# Patient Record
Sex: MALE | Race: WHITE | Employment: FULL TIME | URBAN - METROPOLITAN AREA
[De-identification: names, ages, dates, MRNs, and addresses within clinical notes are randomized per-mention and may not be internally consistent; named-entity substitution may affect disease eponyms.]

---

## 2017-02-13 NOTE — PROGRESS NOTES
SUBJECTIVE:     CC: Eric Mandel is an 36 year old male who presents for preventative health visit.     Physical   Annual:     Getting at least 3 servings of Calcium per day::  Yes    Bi-annual eye exam::  Yes    Dental care twice a year::  Yes    Sleep apnea or symptoms of sleep apnea::  None    Diet::  Regular (no restrictions)    Frequency of exercise::  4-5 days/week    Duration of exercise::  Greater than 60 minutes    Taking medications regularly::  Yes    Medication side effects::  Not applicable    Additional concerns today::  YES      Vitamin D level.  Has money from HSA from previous job and would like to get a variety of other blood tests done today, paying for them out of his HSA if they're not covered.     Would like refill of triamcinolone cream for eczema of left foot today.         Today's PHQ-2 Score:   PHQ-2 ( 1999 Pfizer) 2/8/2017   Q1: Little interest or pleasure in doing things -   Q2: Feeling down, depressed or hopeless -   PHQ-2 Score -   Little interest or pleasure in doing things Not at all   Feeling down, depressed or hopeless Not at all   PHQ-2 Score 0       Abuse: Current or Past(Physical, Sexual or Emotional)- NO  Do you feel safe in your environment - YES    Social History   Substance Use Topics     Smoking status: Former Smoker     Types: Cigarettes     Quit date: 5/5/2000     Smokeless tobacco: Never Used     Alcohol use Yes      Comment: Occasional     The patient does not drink >3 drinks per day nor >7 drinks per week.    Last PSA: No results found for: PSA    Recent Labs   Lab Test  10/31/14   1134   CHOL  139   HDL  65   LDL  62   TRIG  61   CHOLHDLRATIO  2.1       Reviewed orders with patient. Reviewed health maintenance and updated orders accordingly - Yes    All Histories reviewed and updated in Epic.      ROS:  C: NEGATIVE for fever, chills, change in weight  I: NEGATIVE for worrisome rashes, moles or lesions  E: NEGATIVE for vision changes or irritation  ENT: NEGATIVE for ear,  mouth and throat problems  R: NEGATIVE for significant cough or SOB  CV: NEGATIVE for chest pain, palpitations or peripheral edema  GI: NEGATIVE for nausea, abdominal pain, heartburn, or change in bowel habits   male: negative for dysuria, hematuria, decreased urinary stream, erectile dysfunction, urethral discharge  M: NEGATIVE for significant arthralgias or myalgia  N: NEGATIVE for weakness, dizziness or paresthesias  P: NEGATIVE for changes in mood or affect    Problem list, Medication list, Allergies, and Medical/Social/Surgical histories reviewed in [facility] and updated as appropriate.  Labs reviewed in EPIC  OBJECTIVE:     There were no vitals taken for this visit.  EXAM:  GENERAL: healthy, alert and no distress  EYES: Eyes grossly normal to inspection, PERRL and conjunctivae and sclerae normal  HENT: ear canals and TM's normal, nose and mouth without ulcers or lesions  NECK: cervical adenopathy - one node on left anterior chain, no asymmetry, masses, or scars and thyroid normal to palpation  RESP: lungs clear to auscultation - no rales, rhonchi or wheezes  CV: regular rate and rhythm, normal S1 S2, no S3 or S4, no murmur, click or rub, no peripheral edema and peripheral pulses strong  ABDOMEN: soft, nontender, no hepatosplenomegaly, no masses and bowel sounds normal   (male): normal male genitalia without lesions or urethral discharge, no hernia  MS: no gross musculoskeletal defects noted, no edema  SKIN: no suspicious lesions or rashes  NEURO: Normal strength and tone, mentation intact and speech normal  PSYCH: mentation appears normal, affect normal/bright    ASSESSMENT/PLAN:     1. Encounter for routine adult health examination without abnormal findings  For the most part, is up to date with preventative care measures.  Encouraged continued good diet and exercise program.   - Comprehensive metabolic panel (BMP + Alb, Alk Phos, ALT, AST, Total. Bili, TP)  - Vitamin D Deficiency  - Testosterone, total  - TSH  "with free T4 reflex  - CBC with platelets differential  - TDAP (ADACEL AGES 11-64)    2. Need for prophylactic vaccination with tetanus-diphtheria (TD)  Tdap given today.    3. Other eczema  Refill given for small spot of eczema on foot.  - triamcinolone (KENALOG) 0.1 % cream; Apply sparingly to affected area three times daily for 14 days.  Dispense: 30 g; Refill: 1    COUNSELING:   Reviewed preventive health counseling, as reflected in patient instructions         reports that he quit smoking about 16 years ago. His smoking use included Cigarettes. He has never used smokeless tobacco.    Estimated body mass index is 24.17 kg/(m^2) as calculated from the following:    Height as of 6/30/15: 6' 3\" (1.905 m).    Weight as of 6/30/15: 193 lb 6 oz (87.7 kg).   Weight management plan: Discussed healthy diet and exercise guidelines and patient will follow up in 12 months in clinic to re-evaluate.    Counseling Resources:  ATP IV Guidelines  Pooled Cohorts Equation Calculator  FRAX Risk Assessment  ICSI Preventive Guidelines  Dietary Guidelines for Americans, 2010  [employer]'s MyPlate  ASA Prophylaxis  Lung CA Screening    Jr Martin Carmona MD  [facility] S[facility]AGEAnswers for HPI/ROS submitted by the patient on 2/8/2017   PHQ-2 Depressed: Not at all, Not at all  PHQ-2 Score: 0    "

## 2017-02-14 ENCOUNTER — OFFICE VISIT (OUTPATIENT)
Dept: FAMILY MEDICINE | Facility: CLINIC | Age: 37
End: 2017-02-14
Payer: COMMERCIAL

## 2017-02-14 VITALS
WEIGHT: 201 LBS | DIASTOLIC BLOOD PRESSURE: 78 MMHG | OXYGEN SATURATION: 98 % | HEART RATE: 92 BPM | TEMPERATURE: 98.2 F | SYSTOLIC BLOOD PRESSURE: 112 MMHG | BODY MASS INDEX: 24.99 KG/M2 | HEIGHT: 75 IN

## 2017-02-14 DIAGNOSIS — Z23 NEED FOR PROPHYLACTIC VACCINATION WITH TETANUS-DIPHTHERIA (TD): ICD-10-CM

## 2017-02-14 DIAGNOSIS — L30.8 OTHER ECZEMA: ICD-10-CM

## 2017-02-14 DIAGNOSIS — Z00.00 ENCOUNTER FOR ROUTINE ADULT HEALTH EXAMINATION WITHOUT ABNORMAL FINDINGS: Primary | ICD-10-CM

## 2017-02-14 LAB
BASOPHILS # BLD AUTO: 0 10E9/L (ref 0–0.2)
BASOPHILS NFR BLD AUTO: 0.4 %
DIFFERENTIAL METHOD BLD: NORMAL
EOSINOPHIL # BLD AUTO: 0.2 10E9/L (ref 0–0.7)
EOSINOPHIL NFR BLD AUTO: 3.4 %
ERYTHROCYTE [DISTWIDTH] IN BLOOD BY AUTOMATED COUNT: 12.8 % (ref 10–15)
HCT VFR BLD AUTO: 46.5 % (ref 40–53)
HGB BLD-MCNC: 16.3 G/DL (ref 13.3–17.7)
LYMPHOCYTES # BLD AUTO: 1.9 10E9/L (ref 0.8–5.3)
LYMPHOCYTES NFR BLD AUTO: 33.4 %
MCH RBC QN AUTO: 31.5 PG (ref 26.5–33)
MCHC RBC AUTO-ENTMCNC: 35.1 G/DL (ref 31.5–36.5)
MCV RBC AUTO: 90 FL (ref 78–100)
MONOCYTES # BLD AUTO: 0.6 10E9/L (ref 0–1.3)
MONOCYTES NFR BLD AUTO: 11.1 %
NEUTROPHILS # BLD AUTO: 2.9 10E9/L (ref 1.6–8.3)
NEUTROPHILS NFR BLD AUTO: 51.7 %
PLATELET # BLD AUTO: 226 10E9/L (ref 150–450)
RBC # BLD AUTO: 5.17 10E12/L (ref 4.4–5.9)
WBC # BLD AUTO: 5.7 10E9/L (ref 4–11)

## 2017-02-14 PROCEDURE — 36415 COLL VENOUS BLD VENIPUNCTURE: CPT | Performed by: FAMILY MEDICINE

## 2017-02-14 PROCEDURE — 80050 GENERAL HEALTH PANEL: CPT | Performed by: FAMILY MEDICINE

## 2017-02-14 PROCEDURE — 84403 ASSAY OF TOTAL TESTOSTERONE: CPT | Performed by: FAMILY MEDICINE

## 2017-02-14 PROCEDURE — 90715 TDAP VACCINE 7 YRS/> IM: CPT | Performed by: FAMILY MEDICINE

## 2017-02-14 PROCEDURE — 99395 PREV VISIT EST AGE 18-39: CPT | Mod: 25 | Performed by: FAMILY MEDICINE

## 2017-02-14 PROCEDURE — 82306 VITAMIN D 25 HYDROXY: CPT | Performed by: FAMILY MEDICINE

## 2017-02-14 PROCEDURE — 90471 IMMUNIZATION ADMIN: CPT | Performed by: FAMILY MEDICINE

## 2017-02-14 RX ORDER — TRIAMCINOLONE ACETONIDE 1 MG/G
CREAM TOPICAL
Qty: 30 G | Refills: 1 | Status: SHIPPED | OUTPATIENT
Start: 2017-02-14 | End: 2019-12-02

## 2017-02-14 NOTE — MR AVS SNAPSHOT
After Visit Summary   2/14/2017    Eric Mandel    MRN: [id number]           Patient Information     Date Of Birth          1980        Visit Information        Provider Department      2/14/2017 9:40 AM Martin Carmona Jr., MD [facility]        Today's Diagnoses     Encounter for routine adult health examination without abnormal findings    -  1    Need for prophylactic vaccination with tetanus-diphtheria (TD)        Other eczema           Follow-ups after your visit        Follow-up notes from your care team     Return in about 1 year (around 2/14/2018) for Routine Visit.      Who to contact     If you have questions or need follow up information about today's clinic visit or your schedule please contact [facility] directly at [phone].  Normal or non-critical lab and imaging results will be communicated to you by MyChart, letter or phone within 4 business days after the clinic has received the results. If you do not hear from us within 7 days, please contact the clinic through [employer] or phone. If you have a critical or abnormal lab result, we will notify you by phone as soon as possible.  Submit refill requests through [employer] or call your pharmacy and they will forward the refill request to us. Please allow 3 business days for your refill to be completed.          Additional Information About Your Visit        MyChart Information     [employer] gives you secure access to your electronic health record. If you see a primary care provider, you can also send messages to your care team and make appointments. If you have questions, please call your primary care clinic.  If you do not have a primary care provider, please call [phone] and they will assist you.        Care EveryWhere ID     This is your Care EveryWhere ID. This could be used by other organizations to access your [facility] medical records  [id number]        Your Vitals Were     Pulse Temperature  "Height Pulse Oximetry BMI (Body Mass Index)       92 98.2  F (36.8  C) (Oral) 6' 3\" (1.905 m) 98% 25.12 kg/m2        Blood Pressure from Last 3 Encounters:   02/14/17 112/78   06/30/15 138/80   10/31/14 124/80    Weight from Last 3 Encounters:   02/14/17 201 lb (91.2 kg)   06/30/15 193 lb 6 oz (87.7 kg)   10/31/14 204 lb (92.5 kg)              We Performed the Following     CBC with platelets differential     Comprehensive metabolic panel (BMP + Alb, Alk Phos, ALT, AST, Total. Bili, TP)     TDAP (ADACEL AGES 11-64)     Testosterone, total     TSH with free T4 reflex     Vitamin D Deficiency          Where to get your medicines      These medications were sent to [facility] [address] AT [facility] [address] [place] [address], [facility] [address]    Hours:  24-hours Phone:  [phone]     triamcinolone 0.1 % cream          Primary Care Provider Office Phone # Fax #    Josefina Schraderpee [facility] [phone]       [facility] [place], [place] [address]. Formerly [facility] [address]        Thank you!     Thank you for choosing [facility]  for your care. Our goal is always to provide you with excellent care. Hearing back from our patients is one way we can continue to improve our services. Please take a few minutes to complete the written survey that you may receive in the mail after your visit with us. Thank you!             Your Updated Medication List - Protect others around you: Learn how to safely use, store and throw away your medicines at www.disposemymeds.org.          This list is accurate as of: 2/14/17 10:20 AM.  Always use your most recent med list.                   Brand Name Dispense Instructions for use    hydrocortisone 1 % ointment      Apply topically 2 times daily       triamcinolone 0.1 % cream    KENALOG    30 g    Apply sparingly to affected area three times daily for 14 days.         "

## 2017-02-14 NOTE — NURSING NOTE
"Chief Complaint   Patient presents with     Physical       Initial /78 (BP Location: Right arm, Patient Position: Chair, Cuff Size: Adult Large)  Pulse 92  Temp 98.2  F (36.8  C) (Oral)  Ht 6' 3\" (1.905 m)  Wt 201 lb (91.2 kg)  SpO2 98%  BMI 25.12 kg/m2 Estimated body mass index is 25.12 kg/(m^2) as calculated from the following:    Height as of this encounter: 6' 3\" (1.905 m).    Weight as of this encounter: 201 lb (91.2 kg).  Medication Reconciliation: complete  "

## 2017-02-15 LAB
ALBUMIN SERPL-MCNC: 4.5 G/DL (ref 3.4–5)
ALP SERPL-CCNC: 89 U/L (ref 40–150)
ALT SERPL W P-5'-P-CCNC: 23 U/L (ref 0–70)
ANION GAP SERPL CALCULATED.3IONS-SCNC: 7 MMOL/L (ref 3–14)
AST SERPL W P-5'-P-CCNC: 14 U/L (ref 0–45)
BILIRUB SERPL-MCNC: 0.8 MG/DL (ref 0.2–1.3)
BUN SERPL-MCNC: 17 MG/DL (ref 7–30)
CALCIUM SERPL-MCNC: 9.3 MG/DL (ref 8.5–10.1)
CHLORIDE SERPL-SCNC: 103 MMOL/L (ref 94–109)
CO2 SERPL-SCNC: 30 MMOL/L (ref 20–32)
CREAT SERPL-MCNC: 0.99 MG/DL (ref 0.66–1.25)
DEPRECATED CALCIDIOL+CALCIFEROL SERPL-MC: 41 UG/L (ref 20–75)
GFR SERPL CREATININE-BSD FRML MDRD: 85 ML/MIN/1.7M2
GLUCOSE SERPL-MCNC: 85 MG/DL (ref 70–99)
POTASSIUM SERPL-SCNC: 3.9 MMOL/L (ref 3.4–5.3)
PROT SERPL-MCNC: 7.2 G/DL (ref 6.8–8.8)
SODIUM SERPL-SCNC: 140 MMOL/L (ref 133–144)
TSH SERPL DL<=0.005 MIU/L-ACNC: 0.83 MU/L (ref 0.4–4)

## 2017-02-16 LAB — TESTOST SERPL-MCNC: 728 NG/DL (ref 240–950)

## 2017-02-17 NOTE — PROGRESS NOTES
Mr. Mandel,    -Liver and gallbladder tests are normal. (ALT,AST, Alk phos, bilirubin), kidney function is normal (Cr, GFR), Sodium is normal, Potassium is normal, Calcium is normal, Glucose is normal (diabetes screening test).   -Cholesterol levels (LDL,HDL, Triglycerides) are normal. ADVISE: rechecking in 5 years.  -Normal red blood cell (hgb) levels, normal white blood cell count and normal platelet levels.  -Vitamin D level is normal, 1000 IU daily in diet or supplements is recommended.   -Testosterone level is normal.    If you have further questions about the interpretation of your labs, labtestsonline.org is a good website to check out for further information.    Please contact the clinic if you have additional questions.  Thank you.    Sincerely,    Martin Carmona MD

## 2017-10-04 ENCOUNTER — TRANSFERRED RECORDS (OUTPATIENT)
Dept: HEALTH INFORMATION MANAGEMENT | Facility: CLINIC | Age: 37
End: 2017-10-04

## 2018-02-12 ENCOUNTER — OFFICE VISIT (OUTPATIENT)
Dept: PODIATRY | Facility: CLINIC | Age: 38
End: 2018-02-12
Payer: COMMERCIAL

## 2018-02-12 VITALS — BODY MASS INDEX: 27.35 KG/M2 | HEIGHT: 75 IN | WEIGHT: 220 LBS | RESPIRATION RATE: 16 BRPM

## 2018-02-12 DIAGNOSIS — G57.92 NEURITIS OF LEFT FOOT: Primary | ICD-10-CM

## 2018-02-12 PROCEDURE — 99203 OFFICE O/P NEW LOW 30 MIN: CPT | Performed by: PODIATRIST

## 2018-02-12 NOTE — PATIENT INSTRUCTIONS
Thank you for choosing [facility]!    DR. RADER'S CLINIC LOCATIONS:   MONDAY - [place] TUESDAY - [place]   [address] [facility]  [address] [facility]   [place] [address] [place] [address]   [phone]       THURSDAY AM - [place] THURSDAY PM - [place]   [address]   [place] [address] [place], [place] [address] [phone]       FRIDAY AM - [place] SET UP SURGERY: [phone] [address] APPOINTMENTS: [phone]   [place] [address] BILLING QUESTIONS: [phone] FAX NUMBER: [phone]     Follow Up: 3 wks    PRICE THERAPY  Many aches and pains throughout the foot and ankle can be helped with many simple treatments. This is usually described as PRICE Therapy.      P - Protection - often times, inflammation/pain in the lower extremity is not able to improve simply because the areas involved are never allowed to rest. Every step we take can bother the problematic area. Protecting those areas is an important step in the healing process. This may involve a walking cast boot, a special insert/orthotic device, an ankle brace, or simply avoiding barefoot walking.    R - Rest - in addition to protecting the foot/ankle, resting is an important, but often times difficult, treatment option. Getting off your feet when they bother you, and specifically avoiding activities that cause pain/discomfort, are very beneficial to prevent, and treat, foot/ankle pain.      I - Ice - icing regularly can help to decrease inflammation and swelling in the foot, thus decreasing pain. Using an ice pack or a bag of frozen veggies works very well. Ice for 20 minutes multiple times per day as needed.  Do not place the ice directly on the skin as this can cause tissue damage.    C - Compression - using a compression wrap or an ACE wrap can help to decrease swelling, which can help to decrease pain. Wearing the  wraps is generally not needed at night, but they should be worn on a regular basis when you are going to be on your feet for prolonged periods as gravity tends to pull fluids down to your feet/ankles.    E - Elevation - elevating your lower extremities multiple times daily for 15-20 minutes can help to decrease swelling, which works well in decreasing pain levels.    NSAID/Tylenol - Anti-inflammatories like Aleve or ibuprofen, and/or a pain medication, such as Tylenol, can help to improve pain levels and get the issue resolved sooner rather than later. Anyone with liver issues should be careful with Tylenol, and anyone with high blood pressure or heart, stomach or kidney issues should be careful with anti-inflammatories. Please ask if you have questions about these medications, including dosage.      Body Mass Index (BMI)  Many things can cause foot and ankle problems. Foot structure, activity level, foot mechanics and injuries are common causes of pain. One very important issue that often goes unmentioned, is body weight. Extra weight can cause increased stress on muscles, ligaments, bones and tendons. Sometimes just a few extra pounds is all it takes to put one over her/his threshold. Without reducing that stress, it can be difficult to alleviate pain. Some people are uncomfortable addressing this issue, but we feel it is important for you to think about it. As Foot &  Ankle specialists, our job is addressing the lower extremity problem and possible causes. Regarding extra body weight, we encourage patients to discuss diet and weight management plans with their primary care doctors. It is this team approach that gives you the best opportunity for pain relief and getting you back on your feet.

## 2018-02-12 NOTE — PROGRESS NOTES
"Foot & Ankle Surgery  February 12, 2018    CC: \"Pain/numbness top of foot'    I was asked to see Eric Mandel regarding the chief complaint by:  self    HPI:  Pt is a 37 year old male who presents with above complaint.  Left foot pain/numbness x 3 months, no injury noted.  \"burning, tearing, sometimes odd feeling of skin sliding around\".  Pain 6/10 \"intermittent, sometimes many x per day\".  Points to dorsal 1st TMT area, localized symptoms without radiation.  Increasing frequency, each episode lasts a few seconds.  Occurs mostly without shoes.  Worse with touching/pressure, although again he reports it occurs mostly without shoes.  No L knee injuries.  Sees chiropractor for neck issues but no lower back pain.  No treatment.  Also has L hallux contusion with subungual hematoma, drained @ UC, xrays neg, no pain.      ROS:   Pos for CC.  The patient denies current nausea, vomiting, chills, fevers, belly pain, calf pain, chest pain or SOB.  Complete remainder of ROS is otherwise neg.    VITALS:    Vitals:    02/12/18 1409   Resp: 16   Weight: 220 lb (99.8 kg)   Height: 6' 3\" (1.905 m)       PMH:    Past Medical History:   Diagnosis Date     NO ACTIVE PROBLEMS (aka NONE)        SXHX:    Past Surgical History:   Procedure Laterality Date     NO HISTORY OF SURGERY          MEDS:    Current Outpatient Prescriptions   Medication     triamcinolone (KENALOG) 0.1 % cream     hydrocortisone 1 % ointment     No current facility-administered medications for this visit.        ALL:   No Known Allergies    FMH:    Family History   Problem Relation Age of Onset     DIABETES Paternal Grandmother      DIABETES Paternal Grandfather        SocHx:    Social History     Social History     Marital status: Single     Spouse name: N/A     Number of children: N/A     Years of education: N/A     Occupational History     Not on file.     Social History Main Topics     Smoking status: Former Smoker     Types: Cigarettes     Quit date: 5/5/2000     " Smokeless tobacco: Never Used     Alcohol use Yes      Comment: Occasional     Drug use: No     Sexual activity: Yes     Partners: Female     Birth control/ protection: Condom     Other Topics Concern     Not on file     Social History Narrative           EXAMINATION:  Gen:   No apparent distress  Neuro:   A&Ox3, no deficits  Psych:    Answering questions appropriately for age and situation with normal affect  Head:    NCAT  Eye:    Visual scanning without deficit  Ear:    Response to auditory stimuli wnl  Lung:    Non-labored breathing on RA noted  Abd:    NTND per patient report  Lymph:    Neg for pitting/non-pitting edema BLE  Vasc:    Pulses palpable, CFT minimally delayed  Neuro:    Light touch sensation intact to all sensory nerve distributions with paresthesias dorsal L midfoot, jesus over 1st TMT joint.  No pain/reproduction of symptoms with percussion/palpation Common or Superficial peroneal nerves, no deep peroneal paresthesias.    Derm:    L hallux previously drained subungual hematoma without SOI  MSK:    Tenderness dorsal soft tissue at 1st TMT joint, but unable to elicit any MSK pain.    Calf:    Neg for redness, swelling or tenderness    Assessment:  37 year old male with painful paresthesias dorsal L midfoot; previously-drained subungual hematoma L hallux      Plan:  Discussed etiologies, anatomy and options  1.  Paresthesias/pain dorsal L midfoot  -no specific nerve involvement based on exam findings  -states it's worse without shoes but touching/pressure worsens symptoms.  Advised comfortable accommodative shoe gear that minimize pressure in the area  -RICE/NSAID prn  -consider PO vs steroid injection  -consider Neurology consult for NCS    Follow up:  3 weeks or sooner with acute issues      Patient's medical history was reviewed today    Body mass index is 27.5 kg/(m^2).  Weight management plan: Patient was referred to their PCP to discuss a diet and exercise plan.        Julio Prince DPM    Podiatric Foot & Ankle Surgeon  [facility]  [phone]

## 2018-02-12 NOTE — NURSING NOTE
"Chief Complaint   Patient presents with     Foot Problems     L dorsal foot pain and numbness x few months on and off and becoming more constant       Initial Resp 16  Ht 6' 3\" (1.905 m)  Wt 220 lb (99.8 kg)  BMI 27.5 kg/m2 Estimated body mass index is 27.5 kg/(m^2) as calculated from the following:    Height as of this encounter: 6' 3\" (1.905 m).    Weight as of this encounter: 220 lb (99.8 kg).  Medication Reconciliation: complete    Srinivasan Baker CMA ([facility])  Podiatry / Foot & Ankle Surgery  [facility]    "

## 2018-02-12 NOTE — MR AVS SNAPSHOT
After Visit Summary   2/12/2018    Eric Mandel    MRN: [id number]           Patient Information     Date Of Birth          1980        Visit Information        Provider Department      2/12/2018 2:15 PM Julio Prince DPM [facility]        Care Instructions    Thank you for choosing [facility]!    DR. PRINCE'S CLINIC LOCATIONS:   MONDAY - [place] TUESDAY - [place]   [address] [facility]  [address] [place]   [place] [address] [place] [address]   [phone]       THURSDAY AM - [place] THURSDAY PM - [place]   [address]   [place] [address] [place], [place] [address] [phone]       FRIDAY AM - [place] SET UP SURGERY: [phone] [address] APPOINTMENTS: [phone]   [place] [address] BILLING QUESTIONS: [phone] FAX NUMBER: [phone]     Follow Up: 3 wks    PRICE THERAPY  Many aches and pains throughout the foot and ankle can be helped with many simple treatments. This is usually described as PRICE Therapy.      P - Protection - often times, inflammation/pain in the lower extremity is not able to improve simply because the areas involved are never allowed to rest. Every step we take can bother the problematic area. Protecting those areas is an important step in the healing process. This may involve a walking cast boot, a special insert/orthotic device, an ankle brace, or simply avoiding barefoot walking.    R - Rest - in addition to protecting the foot/ankle, resting is an important, but often times difficult, treatment option. Getting off your feet when they bother you, and specifically avoiding activities that cause pain/discomfort, are very beneficial to prevent, and treat, foot/ankle pain.      I - Ice - icing regularly can help to decrease inflammation and swelling in the foot, thus decreasing pain. Using an ice pack or a bag of  frozen veggies works very well. Ice for 20 minutes multiple times per day as needed.  Do not place the ice directly on the skin as this can cause tissue damage.    C - Compression - using a compression wrap or an ACE wrap can help to decrease swelling, which can help to decrease pain. Wearing the wraps is generally not needed at night, but they should be worn on a regular basis when you are going to be on your feet for prolonged periods as gravity tends to pull fluids down to your feet/ankles.    E - Elevation - elevating your lower extremities multiple times daily for 15-20 minutes can help to decrease swelling, which works well in decreasing pain levels.    NSAID/Tylenol - Anti-inflammatories like Aleve or ibuprofen, and/or a pain medication, such as Tylenol, can help to improve pain levels and get the issue resolved sooner rather than later. Anyone with liver issues should be careful with Tylenol, and anyone with high blood pressure or heart, stomach or kidney issues should be careful with anti-inflammatories. Please ask if you have questions about these medications, including dosage.      Body Mass Index (BMI)  Many things can cause foot and ankle problems. Foot structure, activity level, foot mechanics and injuries are common causes of pain. One very important issue that often goes unmentioned, is body weight. Extra weight can cause increased stress on muscles, ligaments, bones and tendons. Sometimes just a few extra pounds is all it takes to put one over her/his threshold. Without reducing that stress, it can be difficult to alleviate pain. Some people are uncomfortable addressing this issue, but we feel it is important for you to think about it. As Foot &  Ankle specialists, our job is addressing the lower extremity problem and possible causes. Regarding extra body weight, we encourage patients to discuss diet and weight management plans with their primary care doctors. It is this team approach that gives you  "the best opportunity for pain relief and getting you back on your feet.              Follow-ups after your visit        Who to contact     If you have questions or need follow up information about today's clinic visit or your schedule please contact [facility] directly at [phone].  Normal or non-critical lab and imaging results will be communicated to you by MyChart, letter or phone within 4 business days after the clinic has received the results. If you do not hear from us within 7 days, please contact the clinic through [employer] or phone. If you have a critical or abnormal lab result, we will notify you by phone as soon as possible.  Submit refill requests through [employer] or call your pharmacy and they will forward the refill request to us. Please allow 3 business days for your refill to be completed.          Additional Information About Your Visit        MyChart Information     [employer] gives you secure access to your electronic health record. If you see a primary care provider, you can also send messages to your care team and make appointments. If you have questions, please call your primary care clinic.  If you do not have a primary care provider, please call [phone] and they will assist you.        Care EveryWhere ID     This is your Care EveryWhere ID. This could be used by other organizations to access your [facility] medical records  [id number]        Your Vitals Were     Respirations Height BMI (Body Mass Index)             16 6' 3\" (1.905 m) 27.5 kg/m2          Blood Pressure from Last 3 Encounters:   02/14/17 112/78   06/30/15 138/80   10/31/14 124/80    Weight from Last 3 Encounters:   02/12/18 220 lb (99.8 kg)   02/14/17 201 lb (91.2 kg)   06/30/15 193 lb 6 oz (87.7 kg)              Today, you had the following     No orders found for display       Primary Care Provider Office Phone # Fax #    Josefina Rachelle [facility] [phone]       [address] " [facility] [address]        Equal Access to Services     [facility] MARISSA : Hadii aad ku hadmikachacorta Gee, waverada verónicasharriha, qaybta jasonleylaoctavio de los santos, dominique weaverantonyjoanna stevenson. So [facility] [phone].    ATENCIÓN: Si habla español, tiene a medina disposición servicios gratuitos de asistencia lingüística. Llame al [phone].    We comply with applicable federal civil rights laws and [place] laws. We do not discriminate on the basis of race, color, national origin, age, disability, sex, sexual orientation, or gender identity.            Thank you!     Thank you for choosing [facility]  for your care. Our goal is always to provide you with excellent care. Hearing back from our patients is one way we can continue to improve our services. Please take a few minutes to complete the written survey that you may receive in the mail after your visit with us. Thank you!             Your Updated Medication List - Protect others around you: Learn how to safely use, store and throw away your medicines at www.disposemymeds.org.          This list is accurate as of 2/12/18  2:28 PM.  Always use your most recent med list.                   Brand Name Dispense Instructions for use Diagnosis    hydrocortisone 1 % ointment      Apply topically 2 times daily        triamcinolone 0.1 % cream    KENALOG    30 g    Apply sparingly to affected area three times daily for 14 days.    Other eczema

## 2018-07-31 ENCOUNTER — OFFICE VISIT (OUTPATIENT)
Dept: INTERNAL MEDICINE | Facility: CLINIC | Age: 38
End: 2018-07-31
Payer: COMMERCIAL

## 2018-07-31 VITALS
OXYGEN SATURATION: 98 % | SYSTOLIC BLOOD PRESSURE: 116 MMHG | HEART RATE: 88 BPM | TEMPERATURE: 98.7 F | RESPIRATION RATE: 16 BRPM | DIASTOLIC BLOOD PRESSURE: 64 MMHG | WEIGHT: 215 LBS | HEIGHT: 75 IN | BODY MASS INDEX: 26.73 KG/M2

## 2018-07-31 DIAGNOSIS — F33.0 MILD EPISODE OF RECURRENT MAJOR DEPRESSIVE DISORDER (H): Primary | ICD-10-CM

## 2018-07-31 DIAGNOSIS — F41.9 ANXIETY: ICD-10-CM

## 2018-07-31 PROCEDURE — 99214 OFFICE O/P EST MOD 30 MIN: CPT | Performed by: NURSE PRACTITIONER

## 2018-07-31 RX ORDER — PROPRANOLOL HYDROCHLORIDE 10 MG/1
10 TABLET ORAL 3 TIMES DAILY PRN
Qty: 90 TABLET | Refills: 1 | Status: SHIPPED | OUTPATIENT
Start: 2018-07-31 | End: 2018-10-10

## 2018-07-31 ASSESSMENT — ANXIETY QUESTIONNAIRES
1. FEELING NERVOUS, ANXIOUS, OR ON EDGE: MORE THAN HALF THE DAYS
3. WORRYING TOO MUCH ABOUT DIFFERENT THINGS: SEVERAL DAYS
2. NOT BEING ABLE TO STOP OR CONTROL WORRYING: SEVERAL DAYS
5. BEING SO RESTLESS THAT IT IS HARD TO SIT STILL: NOT AT ALL
GAD7 TOTAL SCORE: 8
7. FEELING AFRAID AS IF SOMETHING AWFUL MIGHT HAPPEN: MORE THAN HALF THE DAYS
6. BECOMING EASILY ANNOYED OR IRRITABLE: SEVERAL DAYS
4. TROUBLE RELAXING: SEVERAL DAYS
7. FEELING AFRAID AS IF SOMETHING AWFUL MIGHT HAPPEN: MORE THAN HALF THE DAYS

## 2018-07-31 ASSESSMENT — PATIENT HEALTH QUESTIONNAIRE - PHQ9
SUM OF ALL RESPONSES TO PHQ QUESTIONS 1-9: 5
10. IF YOU CHECKED OFF ANY PROBLEMS, HOW DIFFICULT HAVE THESE PROBLEMS MADE IT FOR YOU TO DO YOUR WORK, TAKE CARE OF THINGS AT HOME, OR GET ALONG WITH OTHER PEOPLE: SOMEWHAT DIFFICULT
SUM OF ALL RESPONSES TO PHQ QUESTIONS 1-9: 5

## 2018-07-31 NOTE — MR AVS SNAPSHOT
After Visit Summary   7/31/2018    Eric Mandel    MRN: [id number]           Patient Information     Date Of Birth          1980        Visit Information        Provider Department      7/31/2018 1:40 PM Maddie Adam APRN CNP [facility]        Today's Diagnoses     Mild episode of recurrent major depressive disorder (H)    -  1    Anxiety          Care Instructions    Zoloft 50 mg daily     If you feel this is not enough in 2 weeks time we can increase the dose     Propranolol if needed for anxiety   Take 1 tab up to 3 times a day for anxiety     If you decide you want to do counseling Alison Wray is counselor here     Follow up in a month               Follow-ups after your visit        Who to contact     If you have questions or need follow up information about today's clinic visit or your schedule please contact [facility] directly at [phone].  Normal or non-critical lab and imaging results will be communicated to you by Live[employer]hart, letter or phone within 4 business days after the clinic has received the results. If you do not hear from us within 7 days, please contact the clinic through [employer] or phone. If you have a critical or abnormal lab result, we will notify you by phone as soon as possible.  Submit refill requests through [employer] or call your pharmacy and they will forward the refill request to us. Please allow 3 business days for your refill to be completed.          Additional Information About Your Visit        MyChart Information     [employer] gives you secure access to your electronic health record. If you see a primary care provider, you can also send messages to your care team and make appointments. If you have questions, please call your primary care clinic.  If you do not have a primary care provider, please call [phone] and they will assist you.        Care EveryWhere ID     This is your Care EveryWhere ID. This could be used  "by other organizations to access your [facility] medical records  [id number]        Your Vitals Were     Pulse Temperature Respirations Height Pulse Oximetry BMI (Body Mass Index)    88 98.7  F (37.1  C) (Oral) 16 6' 3\" (1.905 m) 98% 26.87 kg/m2       Blood Pressure from Last 3 Encounters:   07/31/18 116/64   02/14/17 112/78   06/30/15 138/80    Weight from Last 3 Encounters:   07/31/18 215 lb (97.5 kg)   02/12/18 220 lb (99.8 kg)   02/14/17 201 lb (91.2 kg)              Today, you had the following     No orders found for display         Today's Medication Changes          These changes are accurate as of 7/31/18  2:53 PM.  If you have any questions, ask your nurse or doctor.               Start taking these medicines.        Dose/Directions    propranolol 10 MG tablet   Commonly known as:  INDERAL   Used for:  Mild episode of recurrent major depressive disorder (H), Anxiety   Started by:  Maddie Adam APRN CNP        Dose:  10 mg   Take 1 tablet (10 mg) by mouth 3 times daily as needed   Quantity:  90 tablet   Refills:  1       sertraline 50 MG tablet   Commonly known as:  ZOLOFT   Used for:  Mild episode of recurrent major depressive disorder (H), Anxiety   Started by:  Maddie Adam APRN CNP        Dose:  50 mg   Take 1 tablet (50 mg) by mouth daily   Quantity:  30 tablet   Refills:  1            Where to get your medicines      These medications were sent to [facility] [address] [address]     Phone:  [phone]     propranolol 10 MG tablet    sertraline 50 MG tablet                Primary Care Provider Office Phone # Fax #    Josefina Rachelle [facility] [phone]       [address] [place] [address]        Equal Access to Services     NIDIA ANN AH: Lawanda wilburno Sojuhi, waaxda luqadaha, qaybta kaalmada adeegyaoctavio, dominique stevenson. So [facility] " [phone].    ATENCIÓN: Si caitie angel, tiene a medina disposición servicios gratuitos de asistencia lingüística. Diana grant [phone].    We comply with applicable federal civil rights laws and [place] laws. We do not discriminate on the basis of race, color, national origin, age, disability, sex, sexual orientation, or gender identity.            Thank you!     Thank you for choosing [facility]  for your care. Our goal is always to provide you with excellent care. Hearing back from our patients is one way we can continue to improve our services. Please take a few minutes to complete the written survey that you may receive in the mail after your visit with us. Thank you!             Your Updated Medication List - Protect others around you: Learn how to safely use, store and throw away your medicines at www.disposemymeds.org.          This list is accurate as of 7/31/18  2:53 PM.  Always use your most recent med list.                   Brand Name Dispense Instructions for use Diagnosis    propranolol 10 MG tablet    INDERAL    90 tablet    Take 1 tablet (10 mg) by mouth 3 times daily as needed    Mild episode of recurrent major depressive disorder (H), Anxiety       sertraline 50 MG tablet    ZOLOFT    30 tablet    Take 1 tablet (50 mg) by mouth daily    Mild episode of recurrent major depressive disorder (H), Anxiety       triamcinolone 0.1 % cream    KENALOG    30 g    Apply sparingly to affected area three times daily for 14 days.    Other eczema

## 2018-07-31 NOTE — PROGRESS NOTES
SUBJECTIVE:   Eric Mandel is a 37 year old male who presents to clinic today for the following health issues:  Answers for HPI/ROS submitted by the patient on 7/31/2018   If you checked off any problems, how difficult have these problems made it for you to do your work, take care of things at home, or get along with other people?: Somewhat difficult  PHQ9 TOTAL SCORE: 5  PRABHJOT 7 TOTAL SCORE: 8    His wife is going through chemo treatment and could die if BMTX is not successful  Meeting with oncology regarding treatment  Next week   Her brother being tested for match - if no match will do stem cells from umbilical blood      I am primary caregiver for my wife who is undergoing   treatment for stage 4 Burkitt's lymphoma. Could use   some assistance / medication for stress and anxiety.          Problem list and histories reviewed & adjusted, as indicated.  Additional history: as documented    Patient Active Problem List   Diagnosis     CARDIOVASCULAR SCREENING; LDL GOAL LESS THAN 160     Anxiety     Mild episode of recurrent major depressive disorder (H)     Past Surgical History:   Procedure Laterality Date     NO HISTORY OF SURGERY         Social History   Substance Use Topics     Smoking status: Former Smoker     Types: Cigarettes     Quit date: 5/5/2000     Smokeless tobacco: Never Used     Alcohol use Yes      Comment: Occasional     Family History   Problem Relation Age of Onset     Diabetes Paternal Grandmother      Diabetes Paternal Grandfather            Reviewed and updated as needed this visit by clinical staff  Tobacco  Allergies  Meds  Med Hx  Surg Hx  Fam Hx  Soc Hx      Reviewed and updated as needed this visit by Provider         ROS:  Constitutional, HEENT, cardiovascular, pulmonary, gi and gu systems are negative, except as otherwise noted.    OBJECTIVE:     /64 (BP Location: Left arm, Patient Position: Sitting, Cuff Size: Adult Large)  Pulse 88  Temp 98.7  F (37.1  C) (Oral)  Resp 16   "Ht 6' 3\" (1.905 m)  Wt 215 lb (97.5 kg)  SpO2 98%  BMI 26.87 kg/m2  Body mass index is 26.87 kg/(m^2).  GENERAL: alert and no distress  RESP: lungs clear   CV: regular rate and rhythm  PSYCH: mentation appears normal, affect normal/bright    Diagnostic Test Results:  PHQ-9 SCORE 10/31/2014 7/31/2018   Total Score 0 -   Total Score MyChart - 5 (Mild depression)   Total Score - 5     PRABHJOT-7 SCORE 10/31/2014 7/31/2018   Total Score 0 -   Total Score - 8 (mild anxiety)   Total Score - 8           ASSESSMENT/PLAN:             1. Mild episode of recurrent major depressive disorder (H)  Related to his wife dx cancer and treatment needed   - sertraline (ZOLOFT) 50 MG tablet; Take 1 tablet (50 mg) by mouth daily  Dispense: 30 tablet; Refill: 1  - propranolol (INDERAL) 10 MG tablet; Take 1 tablet (10 mg) by mouth 3 times daily as needed  Dispense: 90 tablet; Refill: 1    2. Anxiety  As above   - sertraline (ZOLOFT) 50 MG tablet; Take 1 tablet (50 mg) by mouth daily  Dispense: 30 tablet; Refill: 1  - propranolol (INDERAL) 10 MG tablet; Take 1 tablet (10 mg) by mouth 3 times daily as needed  Dispense: 90 tablet; Refill: 1    Patient Instructions   Zoloft 50 mg daily     If you feel this is not enough in 2 weeks time we can increase the dose     Propranolol if needed for anxiety   Take 1 tab up to 3 times a day for anxiety     If you decide you want to do counseling Alison Wray is counselor here     Follow up in a month           STELLA Garvey [facility]    "

## 2018-07-31 NOTE — PATIENT INSTRUCTIONS
Zoloft 50 mg daily     If you feel this is not enough in 2 weeks time we can increase the dose     Propranolol if needed for anxiety   Take 1 tab up to 3 times a day for anxiety     If you decide you want to do counseling Alison Wray is counselor here     Follow up in a month

## 2018-08-01 ASSESSMENT — ANXIETY QUESTIONNAIRES: GAD7 TOTAL SCORE: 8

## 2018-08-01 ASSESSMENT — PATIENT HEALTH QUESTIONNAIRE - PHQ9: SUM OF ALL RESPONSES TO PHQ QUESTIONS 1-9: 5

## 2018-08-29 ENCOUNTER — OFFICE VISIT (OUTPATIENT)
Dept: FAMILY MEDICINE | Facility: CLINIC | Age: 38
End: 2018-08-29
Payer: COMMERCIAL

## 2018-08-29 VITALS
HEART RATE: 80 BPM | HEIGHT: 75 IN | BODY MASS INDEX: 25.86 KG/M2 | OXYGEN SATURATION: 98 % | WEIGHT: 208 LBS | TEMPERATURE: 97.7 F | DIASTOLIC BLOOD PRESSURE: 78 MMHG | SYSTOLIC BLOOD PRESSURE: 114 MMHG

## 2018-08-29 DIAGNOSIS — H61.22 IMPACTED CERUMEN OF LEFT EAR: Primary | ICD-10-CM

## 2018-08-29 DIAGNOSIS — Z23 NEED FOR INFLUENZA VACCINATION: ICD-10-CM

## 2018-08-29 DIAGNOSIS — R59.9 LYMPH NODE ENLARGEMENT: ICD-10-CM

## 2018-08-29 DIAGNOSIS — Z23 NEED FOR PROPHYLACTIC VACCINATION AND INOCULATION AGAINST INFLUENZA: ICD-10-CM

## 2018-08-29 PROCEDURE — 90471 IMMUNIZATION ADMIN: CPT | Performed by: PHYSICIAN ASSISTANT

## 2018-08-29 PROCEDURE — 69209 REMOVE IMPACTED EAR WAX UNI: CPT | Performed by: PHYSICIAN ASSISTANT

## 2018-08-29 PROCEDURE — 90686 IIV4 VACC NO PRSV 0.5 ML IM: CPT | Performed by: PHYSICIAN ASSISTANT

## 2018-08-29 PROCEDURE — 99213 OFFICE O/P EST LOW 20 MIN: CPT | Mod: 25 | Performed by: PHYSICIAN ASSISTANT

## 2018-08-29 NOTE — PATIENT INSTRUCTIONS
Wax impaction resolved after ear lavage.  Reviewed prevention measures for future.  Also incidentally noted an enlarged lymph node. Pt assures me this is the same since 1997. I offered ENT consult, but he declines and will continue with monitoring. Encouraged to return anytime with concerns/changes.

## 2018-08-29 NOTE — MR AVS SNAPSHOT
After Visit Summary   8/29/2018    Eric Mandel    MRN: [id number]           Patient Information     Date Of Birth          1980        Visit Information        Provider Department      8/29/2018 1:20 PM Elvira Riley PA[facility]C [facility]        Today's Diagnoses     Impacted cerumen of left ear    -  1    Lymph node enlargement - L anterior superior cervical chain. Pt reports started after strep episode in 1997 and has been unchanged since.           Care Instructions    Wax impaction resolved after ear lavage.  Reviewed prevention measures for future.  Also incidentally noted an enlarged lymph node. Pt assures me this is the same since 1997. I offered ENT consult, but he declines and will continue with monitoring. Encouraged to return anytime with concerns/changes.           Follow-ups after your visit        Follow-up notes from your care team     Return in about 1 month (around 9/29/2018) for Routine Visit.      Who to contact     If you have questions or need follow up information about today's clinic visit or your schedule please contact [facility] directly at [phone].  Normal or non-critical lab and imaging results will be communicated to you by [employer], letter or phone within 4 business days after the clinic has received the results. If you do not hear from us within 7 days, please contact the clinic through [employer] or phone. If you have a critical or abnormal lab result, we will notify you by phone as soon as possible.  Submit refill requests through [employer] or call your pharmacy and they will forward the refill request to us. Please allow 3 business days for your refill to be completed.          Additional Information About Your Visit        MyChart Information     [employer] gives you secure access to your electronic health record. If you see a primary care provider, you can also send messages to your care team and make appointments. If you have  "questions, please call your primary care clinic.  If you do not have a primary care provider, please call [phone] and they will assist you.        Care EveryWhere ID     This is your Care EveryWhere ID. This could be used by other organizations to access your [place] medical records  [id number]        Your Vitals Were     Pulse Temperature Height Pulse Oximetry BMI (Body Mass Index)       80 97.7  F (36.5  C) (Oral) 6' 3\" (1.905 m) 98% 26 kg/m2        Blood Pressure from Last 3 Encounters:   08/29/18 114/78   07/31/18 116/64   02/14/17 112/78    Weight from Last 3 Encounters:   08/29/18 208 lb (94.3 kg)   07/31/18 215 lb (97.5 kg)   02/12/18 220 lb (99.8 kg)              Today, you had the following     No orders found for display       Primary Care Provider Office Phone # Fax #    [facility] [phone]       [address] [facility] [address]        Equal Access to Services     [facility]: Hadii aad ku hadasho Sojuhi, waaxda luqadaha, qaybta kaalmada filiberto, dominique easton . So [facility] [phone].    ATENCIÓN: Si habla español, tiene a medina disposición servicios gratuitos de asistencia lingüística. Diana al [phone].    We comply with applicable federal civil rights laws and [place] laws. We do not discriminate on the basis of race, color, national origin, age, disability, sex, sexual orientation, or gender identity.            Thank you!     Thank you for choosing [facility]  for your care. Our goal is always to provide you with excellent care. Hearing back from our patients is one way we can continue to improve our services. Please take a few minutes to complete the written survey that you may receive in the mail after your visit with us. Thank you!             Your Updated Medication List - Protect others around you: Learn how to safely use, store and throw away your medicines at www.disposemymeds.org.        "   This list is accurate as of 8/29/18  2:10 PM.  Always use your most recent med list.                   Brand Name Dispense Instructions for use Diagnosis    propranolol 10 MG tablet    INDERAL    90 tablet    Take 1 tablet (10 mg) by mouth 3 times daily as needed    Mild episode of recurrent major depressive disorder (H), Anxiety       sertraline 50 MG tablet    ZOLOFT    30 tablet    Take 1 tablet (50 mg) by mouth daily    Mild episode of recurrent major depressive disorder (H), Anxiety       triamcinolone 0.1 % cream    KENALOG    30 g    Apply sparingly to affected area three times daily for 14 days.    Other eczema

## 2018-08-29 NOTE — PROGRESS NOTES
"  SUBJECTIVE:   Eric Mandel is a 37 year old male who presents to clinic today for the following health issues:      Concern - Intermittent ear pain and pressure in left ear predominantly, sometimes R  Pressure is constant,  Pain is rare.  Monday bothered him - today no pain.   Biggest worry is that wife is undergoing bone marrow transplant so wants to be sure he doesn't have an ear infection so he can be there to care for her.   No fever or URI sx.  No hx of wax impaction.   No hx of recurrent ear infections.  No HL.  No allergies.  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  No drainage.     Onset: long time - 5-10 months at least    Description:   Left ear pressure all the time- occasional pains intermittently 4-5/10    Intensity: moderate    Progression of Symptoms:  intermittent    Accompanying Signs & Symptoms:  -occasional ringing in his ears    Previous history of similar problem:       Precipitating factors:   Worsened by:     Alleviating factors:  Improved by:     Therapies Tried and outcome: q tips and occasionally will use hydrogen peroxide.  Never feels like he can get deep enough     Problem list and histories reviewed & adjusted, as indicated.  Additional history: as documented    Patient Active Problem List   Diagnosis     CARDIOVASCULAR SCREENING; LDL GOAL LESS THAN 160     Anxiety     Mild episode of recurrent major depressive disorder (H)     Past Surgical History:   Procedure Laterality Date     NO HISTORY OF SURGERY         Social History   Substance Use Topics     Smoking status: Former Smoker     Types: Cigarettes     Quit date: 5/5/2000     Smokeless tobacco: Never Used     Alcohol use Yes      Comment: Occasional     Family History   Problem Relation Age of Onset     Diabetes Paternal Grandmother      Diabetes Paternal Grandfather          Current Outpatient Prescriptions   Medication Sig Dispense Refill     propranolol (INDERAL) 10 MG tablet Take 1 tablet (10 mg) by mouth 3 times daily as needed " "90 tablet 1     sertraline (ZOLOFT) 50 MG tablet Take 1 tablet (50 mg) by mouth daily 30 tablet 1     triamcinolone (KENALOG) 0.1 % cream Apply sparingly to affected area three times daily for 14 days. 30 g 1     No Known Allergies    Reviewed and updated as needed this visit by clinical staff  Tobacco  Allergies  Meds  Med Hx  Surg Hx  Fam Hx  Soc Hx      Reviewed and updated as needed this visit by Provider  Tobacco  Allergies  Meds  Med Hx  Surg Hx  Fam Hx  Soc Hx        ROS:  Constitutional, HEENT, cardiovascular, pulmonary systems are negative, except as otherwise noted.    OBJECTIVE:     /78 (BP Location: Right arm, Cuff Size: Adult Regular)  Pulse 80  Temp 97.7  F (36.5  C) (Oral)  Ht 6' 3\" (1.905 m)  Wt 208 lb (94.3 kg)  SpO2 98%  BMI 26 kg/m2  Body mass index is 26 kg/(m^2).  GENERAL: healthy, alert and no distress  EYES: Eyes grossly normal to inspection, PERRL and conjunctivae and sclerae normal  HENT: normal cephalic/atraumatic, right ear: normal: no effusions, no erythema, normal landmarks, left ear: occluded with wax originally, but this resolved after ear lavage by my MA. Nose and mouth without ulcers or lesions, oropharynx clear and oral mucous membranes moist  NECK: pt has an obvious L anterior superior cervical enlarged olive sized lymph node; mobiles, non-tender. no asymmetry, masses, or scars and thyroid normal to palpation  RESP: lungs clear to auscultation - no rales, rhonchi or wheezes  CV: regular rates and rhythm and no murmur, click or rub    Diagnostic Test Results:  none     ASSESSMENT/PLAN:         ICD-10-CM    1. Impacted cerumen of left ear H61.22    2. Lymph node enlargement - L anterior superior cervical chain. Pt reports started after strep episode in 1997 and has been unchanged since.  R59.9    3. Need for influenza vaccination Z23    4. Need for prophylactic vaccination and inoculation against influenza Z23 FLU VACCINE, SPLIT VIRUS, IM (QUADRIVALENT) " [38375]- >3 YRS     Vaccine Administration, Initial [09586]   See Patient Instructions  Pt in agreement with plan.   Encouraged to return for routine physical as well.   Patient Instructions   Wax impaction resolved after ear lavage.  Reviewed prevention measures for future.  Also incidentally noted an enlarged lymph node. Pt assures me this is the same since 1997. I offered ENT consult, but he declines and will continue with monitoring. Encouraged to return anytime with concerns/changes.     Elvira Riley PA-C  [facility]

## 2018-08-29 NOTE — PROGRESS NOTES

## 2018-09-04 ENCOUNTER — OFFICE VISIT (OUTPATIENT)
Dept: FAMILY MEDICINE | Facility: CLINIC | Age: 38
End: 2018-09-04
Payer: COMMERCIAL

## 2018-09-04 VITALS
HEART RATE: 91 BPM | SYSTOLIC BLOOD PRESSURE: 102 MMHG | OXYGEN SATURATION: 98 % | DIASTOLIC BLOOD PRESSURE: 78 MMHG | BODY MASS INDEX: 26.24 KG/M2 | TEMPERATURE: 98.5 F | WEIGHT: 211 LBS | HEIGHT: 75 IN

## 2018-09-04 DIAGNOSIS — Z63.79 STRESS DUE TO ILLNESS OF FAMILY MEMBER: ICD-10-CM

## 2018-09-04 DIAGNOSIS — E66.3 OVERWEIGHT (BMI 25.0-29.9): ICD-10-CM

## 2018-09-04 DIAGNOSIS — L30.8 OTHER ECZEMA: ICD-10-CM

## 2018-09-04 DIAGNOSIS — Z00.00 ENCOUNTER FOR ROUTINE ADULT HEALTH EXAMINATION WITHOUT ABNORMAL FINDINGS: Primary | ICD-10-CM

## 2018-09-04 DIAGNOSIS — Z13.1 SCREENING FOR DIABETES MELLITUS: ICD-10-CM

## 2018-09-04 DIAGNOSIS — Z13.29 SCREENING FOR THYROID DISORDER: ICD-10-CM

## 2018-09-04 DIAGNOSIS — Z11.4 ENCOUNTER FOR SCREENING FOR HIV: ICD-10-CM

## 2018-09-04 DIAGNOSIS — Z13.6 CARDIOVASCULAR SCREENING; LDL GOAL LESS THAN 160: ICD-10-CM

## 2018-09-04 PROBLEM — R59.9 LYMPH NODE ENLARGEMENT: Status: ACTIVE | Noted: 2018-09-04

## 2018-09-04 LAB
ANION GAP SERPL CALCULATED.3IONS-SCNC: 6 MMOL/L (ref 3–14)
BUN SERPL-MCNC: 14 MG/DL (ref 7–30)
CALCIUM SERPL-MCNC: 9 MG/DL (ref 8.5–10.1)
CHLORIDE SERPL-SCNC: 105 MMOL/L (ref 94–109)
CHOLEST SERPL-MCNC: 126 MG/DL
CO2 SERPL-SCNC: 30 MMOL/L (ref 20–32)
CREAT SERPL-MCNC: 1.05 MG/DL (ref 0.66–1.25)
GFR SERPL CREATININE-BSD FRML MDRD: 79 ML/MIN/1.7M2
GLUCOSE SERPL-MCNC: 88 MG/DL (ref 70–99)
HDLC SERPL-MCNC: 58 MG/DL
LDLC SERPL CALC-MCNC: 56 MG/DL
NONHDLC SERPL-MCNC: 68 MG/DL
POTASSIUM SERPL-SCNC: 4.1 MMOL/L (ref 3.4–5.3)
SODIUM SERPL-SCNC: 141 MMOL/L (ref 133–144)
TRIGL SERPL-MCNC: 62 MG/DL
TSH SERPL DL<=0.005 MIU/L-ACNC: 0.51 MU/L (ref 0.4–4)

## 2018-09-04 PROCEDURE — 99395 PREV VISIT EST AGE 18-39: CPT | Performed by: PHYSICIAN ASSISTANT

## 2018-09-04 PROCEDURE — 87389 HIV-1 AG W/HIV-1&-2 AB AG IA: CPT | Performed by: PHYSICIAN ASSISTANT

## 2018-09-04 PROCEDURE — 36415 COLL VENOUS BLD VENIPUNCTURE: CPT | Performed by: PHYSICIAN ASSISTANT

## 2018-09-04 PROCEDURE — 80061 LIPID PANEL: CPT | Performed by: PHYSICIAN ASSISTANT

## 2018-09-04 PROCEDURE — 84443 ASSAY THYROID STIM HORMONE: CPT | Performed by: PHYSICIAN ASSISTANT

## 2018-09-04 PROCEDURE — 80048 BASIC METABOLIC PNL TOTAL CA: CPT | Performed by: PHYSICIAN ASSISTANT

## 2018-09-04 ASSESSMENT — ANXIETY QUESTIONNAIRES
2. NOT BEING ABLE TO STOP OR CONTROL WORRYING: SEVERAL DAYS
GAD7 TOTAL SCORE: 4
5. BEING SO RESTLESS THAT IT IS HARD TO SIT STILL: NOT AT ALL
3. WORRYING TOO MUCH ABOUT DIFFERENT THINGS: SEVERAL DAYS
6. BECOMING EASILY ANNOYED OR IRRITABLE: NOT AT ALL
1. FEELING NERVOUS, ANXIOUS, OR ON EDGE: SEVERAL DAYS
7. FEELING AFRAID AS IF SOMETHING AWFUL MIGHT HAPPEN: SEVERAL DAYS
IF YOU CHECKED OFF ANY PROBLEMS ON THIS QUESTIONNAIRE, HOW DIFFICULT HAVE THESE PROBLEMS MADE IT FOR YOU TO DO YOUR WORK, TAKE CARE OF THINGS AT HOME, OR GET ALONG WITH OTHER PEOPLE: NOT DIFFICULT AT ALL

## 2018-09-04 ASSESSMENT — PATIENT HEALTH QUESTIONNAIRE - PHQ9: 5. POOR APPETITE OR OVEREATING: NOT AT ALL

## 2018-09-04 NOTE — PROGRESS NOTES
SUBJECTIVE:   CC: Eric Mandel is an 37 year old male who presents for preventative health visit.     Healthy Habits:    Do you get at least three servings of calcium containing foods daily (dairy, green leafy vegetables, etc.)? yes    Amount of exercise or daily activities, outside of work: 5 day(s) per week    Problems taking medications regularly No    Medication side effects: No    Have you had an eye exam in the past two years? no    Do you see a dentist twice per year? yes    Do you have sleep apnea, excessive snoring or daytime drowsiness? no       1) Is fasting for labs.     2) Anxiety/depression. Takes zoloft daily and then has inderal 10mg for break-through anxiety. Has only had to use it twice. Originally seen on [place] as more convenient since wife receiving cancer care treatment there. Will be starting work-up for bone marrow Transplant and if all goes well will be set for this next 9/11. Declines any counseling services at this time as doesn't feel he has time, but will let me know if he changes his mind.       Today's PHQ-2 Score:   PHQ-2 ( 1999 Pf[employer]) 7/31/2018 2/14/2017   Q1: Little interest or pleasure in doing things 1 0   Q2: Feeling down, depressed or hopeless 2 0   PHQ-2 Score 3 0   Q1: Little interest or pleasure in doing things - -   Q2: Feeling down, depressed or hopeless - -   PHQ-2 Score - -       Abuse: Current or Past(Physical, Sexual or Emotional)- No  Do you feel safe in your environment - Yes    Social History   Substance Use Topics     Smoking status: Former Smoker     Types: Cigarettes     Quit date: 5/5/2000     Smokeless tobacco: Never Used     Alcohol use Yes      Comment: Occasional      If you drink alcohol do you typically have >3 drinks per day or >7 drinks per week? No                      Last PSA: No results found for: PSA    Reviewed orders with patient. Reviewed health maintenance and updated orders accordingly - Yes  Labs reviewed in EPIC  BP Readings from Last  3 Encounters:   09/04/18 102/78   08/29/18 114/78   07/31/18 116/64    Wt Readings from Last 3 Encounters:   09/04/18 211 lb (95.7 kg)   08/29/18 208 lb (94.3 kg)   07/31/18 215 lb (97.5 kg)                  Patient Active Problem List   Diagnosis     CARDIOVASCULAR SCREENING; LDL GOAL LESS THAN 160     Anxiety     Mild episode of recurrent major depressive disorder (H)     Past Surgical History:   Procedure Laterality Date     NO HISTORY OF SURGERY         Social History   Substance Use Topics     Smoking status: Former Smoker     Types: Cigarettes     Quit date: 5/5/2000     Smokeless tobacco: Never Used     Alcohol use Yes      Comment: Occasional     Family History   Problem Relation Age of Onset     Diabetes Paternal Grandmother      Thyroid Disease Paternal Grandmother      Diabetes Paternal Grandfather          Current Outpatient Prescriptions   Medication Sig Dispense Refill     propranolol (INDERAL) 10 MG tablet Take 1 tablet (10 mg) by mouth 3 times daily as needed 90 tablet 1     sertraline (ZOLOFT) 50 MG tablet Take 1 tablet (50 mg) by mouth daily 30 tablet 1     triamcinolone (KENALOG) 0.1 % cream Apply sparingly to affected area three times daily for 14 days. 30 g 1     No Known Allergies    Reviewed and updated as needed this visit by clinical staff  Tobacco  Allergies  Meds  Med Hx  Surg Hx  Fam Hx  Soc Hx        Reviewed and updated as needed this visit by Provider  Tobacco  Allergies  Meds  Med Hx  Surg Hx  Fam Hx  Soc Hx           ROS:  CONSTITUTIONAL: NEGATIVE for fever, chills, change in weight  INTEGUMENTARY/SKIN: + for small patch of dry skin along R ankle. Uses topical steroid cream given by derm in the past.   EYES: NEGATIVE for vision changes or irritation  ENT: NEGATIVE for ear, mouth and throat problems  RESP: NEGATIVE for significant cough or SOB  CV: NEGATIVE for chest pain, palpitations or peripheral edema  GI: NEGATIVE for nausea, abdominal pain, heartburn, or change in  "bowel habits   male: negative for dysuria, hematuria, decreased urinary stream, erectile dysfunction, urethral discharge  MUSCULOSKELETAL: NEGATIVE for significant arthralgias or myalgia  NEURO: NEGATIVE for weakness, dizziness or paresthesias  PSYCHIATRIC: NEGATIVE for changes in mood or affect    OBJECTIVE:   /78 (BP Location: Right arm, Cuff Size: Adult Large)  Pulse 91  Temp 98.5  F (36.9  C) (Oral)  Ht 6' 3\" (1.905 m)  Wt 211 lb (95.7 kg)  SpO2 98%  BMI 26.37 kg/m2  EXAM:  GENERAL: healthy, alert and no distress  EYES: Eyes grossly normal to inspection, PERRL and conjunctivae and sclerae normal  HENT: ear canals and TM's normal, nose and mouth without ulcers or lesions  NECK: pt continues to have an obvious L anterior superior cervical enlarged olive sized lymph node; mobile, non-tender. See last visit regarding our discussion on this. No other adenopathy, no asymmetry, masses, or scars and thyroid normal to palpation  RESP: lungs clear to auscultation - no rales, rhonchi or wheezes  CV: regular rate and rhythm, normal S1 S2, no S3 or S4, no murmur, click or rub, no peripheral edema and peripheral pulses strong  ABDOMEN: soft, nontender, no hepatosplenomegaly, no masses and bowel sounds normal  MS: no gross musculoskeletal defects noted, no edema  SKIN: small nickel-sized patch of eczematous skin of proximal R foot/ankle. no suspicious lesions or rashes  NEURO: Normal strength and tone, mentation intact and speech normal  PSYCH: mentation appears normal, affect normal/bright    Diagnostic Test Results:  See Flowsheets for PHQ/PRABHJOT scores.    ASSESSMENT/PLAN:       ICD-10-CM    1. Encounter for routine adult health examination without abnormal findings Z00.00    2. Encounter for screening for HIV Z11.4 HIV Antigen Antibody Combo   3. CARDIOVASCULAR SCREENING; LDL GOAL LESS THAN 160 Z13.6 Lipid panel reflex to direct LDL Fasting   4. Screening for diabetes mellitus Z13.1 Basic metabolic panel  (Ca, " "Cl, CO2, Creat, Gluc, K, Na, BUN)   5. Overweight (BMI 25.0-29.9) E66.3    6. Screening for thyroid disorder Z13.29 TSH with free T4 reflex   7. Other eczema - R ankle; uses topical steriod periodically L30.8    Will notify of lab results when available.   Stable on current regimen for anxiety/depression with zoloft. Offered therapy/counseling referral, but pt declines at this time. Will let me know if he changes his mind. Ok to refill meds when due, but advised to check in every 6 months. Ok to have next visit as telephone visit.    COUNSELING:  Reviewed preventive health counseling, as reflected in patient instructions       Regular exercise       Healthy diet/nutrition       HIV screeninx in teen years, 1x in adult years, and at intervals if high risk    BP Readings from Last 1 Encounters:   18 102/78     Estimated body mass index is 26.37 kg/(m^2) as calculated from the following:    Height as of this encounter: 6' 3\" (1.905 m).    Weight as of this encounter: 211 lb (95.7 kg).      Weight management plan: Discussed healthy diet and exercise guidelines and patient will follow up in 12 months in clinic to re-evaluate.     reports that he quit smoking about 18 years ago. His smoking use included Cigarettes. He has never used smokeless tobacco.      Counseling Resources:  ATP IV Guidelines  Pooled Cohorts Equation Calculator  FRAX Risk Assessment  ICSI Preventive Guidelines  Dietary Guidelines for Americans,   USDA's MyPlate  ASA Prophylaxis  Lung CA Screening    Elvira Riley PA-C  [facility]  "

## 2018-09-04 NOTE — LETTER
My Depression Action Plan  Name: Eric Mandel   Date of Birth 1980  Date: 9/4/2018    My doctor[facility]   My clinic: [facility] [place]  [address]  [place] [id number] [phone]          GREEN    ZONE   Good Control    What it looks like:     Things are going generally well. You have normal up s and down s. You may even feel depressed from time to time, but bad moods usually last less than a day.   What you need to do:  1. Continue to care for yourself (see self care plan)  2. Check your depression survival kit and update it as needed  3. Follow your physician s recommendations including any medication.  4. Do not stop taking medication unless you consult with your physician first.           YELLOW         ZONE Getting Worse    What it looks like:     Depression is starting to interfere with your life.     It may be hard to get out of bed; you may be starting to isolate yourself from others.    Symptoms of depression are starting to last most all day and this has happened for several days.     You may have suicidal thoughts but they are not constant.   What you need to do:     1. Call your care team, your response to treatment will improve if you keep your care team informed of your progress. Yellow periods are signs an adjustment may need to be made.     2. Continue your self-care, even if you have to fake it!    3. Talk to someone in your support network    4. Open up your depression survival kit           RED    ZONE Medical Alert - Get Help    What it looks like:     Depression is seriously interfering with your life.     You may experience these or other symptoms: You can t get out of bed most days, can t work or engage in other necessary activities, you have trouble taking care of basic hygiene, or basic responsibilities, thoughts of suicide or death that will not go away, self-injurious behavior.     What you need to do:  1. Call your care team  and request a same-day appointment. If they are not available (weekends or after hours) call your local crisis line, emergency room or 911.            Depression Self Care Plan / Survival Kit    Self-Care for Depression  Here s the deal. Your body and mind are really not as separate as most people think.  What you do and think affects how you feel and how you feel influences what you do and think. This means if you do things that people who feel good do, it will help you feel better.  Sometimes this is all it takes.  There is also a place for medication and therapy depending on how severe your depression is, so be sure to consult with your medical provider and/ or Behavioral Health Consultant if your symptoms are worsening or not improving.     In order to better manage my stress, I will:    Exercise  Get some form of exercise, every day. This will help reduce pain and release endorphins, the  feel good  chemicals in your brain. This is almost as good as taking antidepressants!  This is not the same as joining a gym and then never going! (they count on that by the way ) It can be as simple as just going for a walk or doing some gardening, anything that will get you moving.      Hygiene   Maintain good hygiene (Get out of bed in the morning, Make your bed, Brush your teeth, Take a shower, and Get dressed like you were going to work, even if you are unemployed).  If your clothes don't fit try to get ones that do.    Diet  I will strive to eat foods that are good for me, drink plenty of water, and avoid excessive sugar, caffeine, alcohol, and other mood-altering substances.  Some foods that are helpful in depression are: complex carbohydrates, B vitamins, flaxseed, fish or fish oil, fresh fruits and vegetables.    Psychotherapy  I agree to participate in Individual Therapy (if recommended).    Medication  If prescribed medications, I agree to take them.  Missing doses can result in serious side effects.  I understand  that drinking alcohol, or other illicit drug use, may cause potential side effects.  I will not stop my medication abruptly without first discussing it with my provider.    Staying Connected With Others  I will stay in touch with my friends, family members, and my primary care provider/team.    Use your imagination  Be creative.  We all have a creative side; it doesn t matter if it s oil painting, sand castles, or mud pies! This will also kick up the endorphins.    Witness Beauty  (AKA stop and smell the roses) Take a look outside, even in mid-winter. Notice colors, textures. Watch the squirrels and birds.     Service to others  Be of service to others.  There is always someone else in need.  By helping others we can  get out of ourselves  and remember the really important things.  This also provides opportunities for practicing all the other parts of the program.    Humor  Laugh and be silly!  Adjust your TV habits for less news and crime-drama and more comedy.    Control your stress  Try breathing deep, massage therapy, biofeedback, and meditation. Find time to relax each day.     My support system    Clinic Contact:  Phone number:    Contact 1:  Phone number:    Contact 2:  Phone number:    Amish/:  Phone number:    Therapist:  Phone number:    Local crisis center:    Phone number:    Other community support:  Phone number:

## 2018-09-04 NOTE — NURSING NOTE
"Chief Complaint   Patient presents with     Physical     pt is fasting    /78 (BP Location: Right arm, Cuff Size: Adult Large)  Pulse 91  Temp 98.5  F (36.9  C) (Oral)  Ht 6' 3\" (1.905 m)  Wt 211 lb (95.7 kg)  SpO2 98%  BMI 26.37 kg/m2 Body Mass Index is Body mass index is 26.37 kg/(m^2).  BP completed using cuff size : large right arm  Francheska Godinez MA        "

## 2018-09-04 NOTE — MR AVS SNAPSHOT
After Visit Summary   9/4/2018    Eric Mandel    MRN: [id number]           Patient Information     Date Of Birth          1980        Visit Information        Provider Department      9/4/2018 8:20 AM Elvira Riley PA-C [facility] [place]        Today's Diagnoses     Encounter for routine adult health examination without abnormal findings    -  1    Encounter for screening for HIV        CARDIOVASCULAR SCREENING; LDL GOAL LESS THAN 160        Screening for diabetes mellitus        Overweight (BMI 25.0-29.9)        Screening for thyroid disorder        Other eczema - R ankle; uses topical steriod periodically          Care Instructions      Preventive Health Recommendations  Male Ages 26 - 39    Yearly exam:             See your health care provider every year in order to  o   Review health changes.   o   Discuss preventive care.    o   Review your medicines if your doctor has prescribed any.    You should be tested each year for STDs (sexually transmitted diseases), if you re at risk.     After age 35, talk to your provider about cholesterol testing. If you are at risk for heart disease, have your cholesterol tested at least every 5 years.     If you are at risk for diabetes, you should have a diabetes test (fasting glucose).  Shots: Get a flu shot each year. Get a tetanus shot every 10 years.     Nutrition:    Eat at least 5 servings of fruits and vegetables daily.     Eat whole-grain bread, whole-wheat pasta and brown rice instead of white grains and rice.     Get adequate Calcium and Vitamin D.     Lifestyle    Exercise for at least 150 minutes a week (30 minutes a day, 5 days a week). This will help you control your weight and prevent disease.     Limit alcohol to one drink per day.     No smoking.     Wear sunscreen to prevent skin cancer.     See your dentist every six months for an exam and cleaning.             Follow-ups after your visit        Follow-up notes from  "your care team     Return in about 6 months (around 3/4/2019) for telephone visit to check in regarding mood.      Who to contact     If you have questions or need follow up information about today's clinic visit or your schedule please contact [facility] [place] directly at [phone].  Normal or non-critical lab and imaging results will be communicated to you by MyChart, letter or phone within 4 business days after the clinic has received the results. If you do not hear from us within 7 days, please contact the clinic through [employer] or phone. If you have a critical or abnormal lab result, we will notify you by phone as soon as possible.  Submit refill requests through Signature or call your pharmacy and they will forward the refill request to us. Please allow 3 business days for your refill to be completed.          Additional Information About Your Visit        MyChart Information     Signature gives you secure access to your electronic health record. If you see a primary care provider, you can also send messages to your care team and make appointments. If you have questions, please call your primary care clinic.  If you do not have a primary care provider, please call [phone] and they will assist you.        Care EveryWhere ID     This is your Care EveryWhere ID. This could be used by other organizations to access your [facility] medical records  [id number]        Your Vitals Were     Pulse Temperature Height Pulse Oximetry BMI (Body Mass Index)       91 98.5  F (36.9  C) (Oral) 6' 3\" (1.905 m) 98% 26.37 kg/m2        Blood Pressure from Last 3 Encounters:   09/04/18 102/78   08/29/18 114/78   07/31/18 116/64    Weight from Last 3 Encounters:   09/04/18 211 lb (95.7 kg)   08/29/18 208 lb (94.3 kg)   07/31/18 215 lb (97.5 kg)              We Performed the Following     Basic metabolic panel  (Ca, Cl, CO2, Creat, Gluc, K, Na, BUN)     DEPRESSION ACTION PLAN (DAP)     HIV Antigen Antibody Combo     Lipid " panel reflex to direct LDL Fasting     TSH with free T4 reflex        Primary Care Provider Office Phone # Fax #    [facility] [phone]       [address] [facility] [place] [address]        Equal Access to Services     NIDIA ANN : Hadii aad ku hadmikao Soomaali, waaxda luqadaha, qaybta kaalmada adeegyada, waxay kwabenain haykerenn adeeg xochitl jemma stevenson. So [facility] [phone].    ATENCIÓN: Si habla español, tiene a medina disposición servicios gratuitos de asistencia lingüística. Llame al [phone].    We comply with applicable federal civil rights laws and [place] laws. We do not discriminate on the basis of race, color, national origin, age, disability, sex, sexual orientation, or gender identity.            Thank you!     [facility] you for [facility]  for your [facility]. Our goal is always to provide you with excellent care. Hearing back from our patients is one way we can continue to improve our services. Please take a few minutes to complete the written survey that you may receive in the mail after your visit with us. Thank you!             Your Updated Medication List - Protect others around you: Learn how to safely use, store and throw away your medicines at www.disposemymeds.org.          This list is accurate as of 9/4/18  8:58 AM.  Always use your most recent med list.                   Brand Name Dispense Instructions for use Diagnosis    propranolol 10 MG tablet    INDERAL    90 tablet    Take 1 tablet (10 mg) by mouth 3 times daily as needed    Mild episode of recurrent major depressive disorder (H), Anxiety       sertraline 50 MG tablet    ZOLOFT    30 tablet    Take 1 tablet (50 mg) by mouth daily    Mild episode of recurrent major depressive disorder (H), Anxiety       triamcinolone 0.1 % cream    KENALOG    30 g    Apply sparingly to affected area three times daily for 14 days.    Other eczema

## 2018-09-04 NOTE — LETTER
September 6, 2018      Eric Mandel  [address] [place] [address]        Dear ,    We are writing to inform you of your test results.    -Kidney function is normal (Cr, GFR), Sodium is normal, Potassium is normal, Calcium is normal, Glucose is normal (diabetes screening test).   -Cholesterol levels (LDL,HDL, Triglycerides) are normal. ADVISE: rechecking in 5 years.   -TSH (thyroid stimulating hormone) level is normal which indicates normal thyroid function.   -HIV screening was normal/negative.     Resulted Orders   Lipid panel reflex to direct LDL Fasting   Result Value Ref Range    Cholesterol 126 <200 mg/dL    Triglycerides 62 <150 mg/dL      Comment:      Fasting specimen    HDL Cholesterol 58 >39 mg/dL    LDL Cholesterol Calculated 56 <100 mg/dL      Comment:      Desirable:       <100 mg/dl    Non HDL Cholesterol 68 <130 mg/dL   Basic metabolic panel  (Ca, Cl, CO2, Creat, Gluc, K, Na, BUN)   Result Value Ref Range    Sodium 141 133 - 144 mmol/L    Potassium 4.1 3.4 - 5.3 mmol/L    Chloride 105 94 - 109 mmol/L    Carbon Dioxide 30 20 - 32 mmol/L    Anion Gap 6 3 - 14 mmol/L    Glucose 88 70 - 99 mg/dL      Comment:      Fasting specimen    Urea Nitrogen 14 7 - 30 mg/dL    Creatinine 1.05 0.66 - 1.25 mg/dL    GFR Estimate 79 >60 mL/min/1.7m2      Comment:      Non  GFR Calc    GFR Estimate If Black >90 >60 mL/min/1.7m2      Comment:       GFR Calc    Calcium 9.0 8.5 - 10.1 mg/dL   HIV Antigen Antibody Combo   Result Value Ref Range    HIV Antigen Antibody Combo Nonreactive NR^Nonreactive          Comment:      HIV-1 p24 Ag & HIV-1/HIV-2 Ab Not Detected   TSH with free T4 reflex   Result Value Ref Range    TSH 0.51 0.40 - 4.00 mU/L       If you have any questions or concerns, please call the clinic at the number listed above.       Sincerely,        Elvira Riley PA-C

## 2018-09-05 LAB — HIV 1+2 AB+HIV1 P24 AG SERPL QL IA: NONREACTIVE

## 2018-09-05 NOTE — PROGRESS NOTES
Please call or write patient with the following results:    -Kidney function is normal (Cr, GFR), Sodium is normal, Potassium is normal, Calcium is normal, Glucose is normal (diabetes screening test).   -Cholesterol levels (LDL,HDL, Triglycerides) are normal. ADVISE: rechecking in 5 years.  -TSH (thyroid stimulating hormone) level is normal which indicates normal thyroid function.  -HIV screening was normal/negative.    Electronically Signed By: Elvira Riley PA-C

## 2018-09-06 ASSESSMENT — PATIENT HEALTH QUESTIONNAIRE - PHQ9: SUM OF ALL RESPONSES TO PHQ QUESTIONS 1-9: 2

## 2018-09-06 ASSESSMENT — ANXIETY QUESTIONNAIRES: GAD7 TOTAL SCORE: 4

## 2018-10-02 ENCOUNTER — MYC REFILL (OUTPATIENT)
Dept: INTERNAL MEDICINE | Facility: CLINIC | Age: 38
End: 2018-10-02

## 2018-10-02 DIAGNOSIS — F41.9 ANXIETY: ICD-10-CM

## 2018-10-02 DIAGNOSIS — F33.0 MILD EPISODE OF RECURRENT MAJOR DEPRESSIVE DISORDER (H): ICD-10-CM

## 2018-10-05 NOTE — TELEPHONE ENCOUNTER
"Requested Prescriptions   Pending Prescriptions Disp Refills     sertraline (ZOLOFT) 50 MG tablet  Last Written Prescription Date:  7/31/18  Last Fill Quantity: 30,  # refills: 1   Last office visit: 7/31/2018 with prescribing provider:  Maddie Adam   Future Office Visit:    30 tablet 1     Sig: Take 1 tablet (50 mg) by mouth daily    SSRIs Protocol Passed    10/5/2018 11:37 AM       Passed - PHQ-9 score less than 5 in past 6 months    Please review last PHQ-9 score.   PHQ-9 score:    PHQ-9 SCORE 9/4/2018   Total Score -   Total Score MyChart -   Total Score 2            Passed - Patient is age 18 or older       Passed - Recent (6 mo) or future (30 days) visit within the authorizing provider's specialty    Patient had office visit in the last 6 months or has a visit in the next 30 days with authorizing provider or within the authorizing provider's specialty.  See \"Patient Info\" tab in inbasket, or \"Choose Columns\" in Meds & Orders section of the refill encounter.          Medication is being filled for 1 time refill only due to:  Patient needs to be seen because due for follow up appointment. Patient informed via MuseStormhart to schedule follow-up appointment.  "

## 2018-10-05 NOTE — TELEPHONE ENCOUNTER
Message from [employer]:  Original authorizing provider: STELLA Garvey CNPmela Olivareslennox would like a refill of the following medications:  sertraline (ZOLOFT) 50 MG tablet [STELLA Garvey CNP]    Preferred pharmacy: [place], [place] - [address] [address]    Comment:  This is working great for me and would like to continue. I'd be open to mail order supplies as well. Thank you!

## 2018-10-10 ENCOUNTER — OFFICE VISIT (OUTPATIENT)
Dept: INTERNAL MEDICINE | Facility: CLINIC | Age: 38
End: 2018-10-10
Payer: COMMERCIAL

## 2018-10-10 VITALS
RESPIRATION RATE: 14 BRPM | TEMPERATURE: 98.3 F | SYSTOLIC BLOOD PRESSURE: 104 MMHG | WEIGHT: 214 LBS | HEIGHT: 75 IN | HEART RATE: 75 BPM | DIASTOLIC BLOOD PRESSURE: 68 MMHG | OXYGEN SATURATION: 97 % | BODY MASS INDEX: 26.61 KG/M2

## 2018-10-10 DIAGNOSIS — F41.9 ANXIETY: ICD-10-CM

## 2018-10-10 DIAGNOSIS — F33.0 MILD EPISODE OF RECURRENT MAJOR DEPRESSIVE DISORDER (H): ICD-10-CM

## 2018-10-10 PROCEDURE — 99213 OFFICE O/P EST LOW 20 MIN: CPT | Performed by: NURSE PRACTITIONER

## 2018-10-10 RX ORDER — PROPRANOLOL HYDROCHLORIDE 10 MG/1
10 TABLET ORAL 3 TIMES DAILY PRN
Qty: 90 TABLET | Refills: 1 | Status: SHIPPED | OUTPATIENT
Start: 2018-10-10 | End: 2019-12-02

## 2018-10-10 ASSESSMENT — PATIENT HEALTH QUESTIONNAIRE - PHQ9: 5. POOR APPETITE OR OVEREATING: NOT AT ALL

## 2018-10-10 ASSESSMENT — ANXIETY QUESTIONNAIRES
5. BEING SO RESTLESS THAT IT IS HARD TO SIT STILL: NOT AT ALL
1. FEELING NERVOUS, ANXIOUS, OR ON EDGE: SEVERAL DAYS
IF YOU CHECKED OFF ANY PROBLEMS ON THIS QUESTIONNAIRE, HOW DIFFICULT HAVE THESE PROBLEMS MADE IT FOR YOU TO DO YOUR WORK, TAKE CARE OF THINGS AT HOME, OR GET ALONG WITH OTHER PEOPLE: NOT DIFFICULT AT ALL
7. FEELING AFRAID AS IF SOMETHING AWFUL MIGHT HAPPEN: SEVERAL DAYS
3. WORRYING TOO MUCH ABOUT DIFFERENT THINGS: NOT AT ALL
2. NOT BEING ABLE TO STOP OR CONTROL WORRYING: NOT AT ALL
GAD7 TOTAL SCORE: 2
6. BECOMING EASILY ANNOYED OR IRRITABLE: NOT AT ALL

## 2018-10-10 NOTE — PROGRESS NOTES
".  SUBJECTIVE:   Eric Mandel is a 37 year old male who presents to clinic today for the following health issues:    Med check on sertraline  He is doing well on medication   Feels dose is adequate, and using propranolol prn - very sparingly         Problem list and histories reviewed & adjusted, as indicated.  Additional history: as documented    Patient Active Problem List   Diagnosis     CARDIOVASCULAR SCREENING; LDL GOAL LESS THAN 160     Anxiety     Mild episode of recurrent major depressive disorder (H)     Other eczema - R ankle; uses topical steriod periodically     Lymph node enlargement - L anterior superior cervical chain. Pt reports started after strep episode in 1997 and has been unchanged since.      Past Surgical History:   Procedure Laterality Date     NO HISTORY OF SURGERY         Social History   Substance Use Topics     Smoking status: Former Smoker     Types: Cigarettes     Quit date: 5/5/2000     Smokeless tobacco: Never Used     Alcohol use Yes      Comment: Occasional     Family History   Problem Relation Age of Onset     Diabetes Paternal Grandmother      Thyroid Disease Paternal Grandmother      non cancerous     Diabetes Paternal Grandfather      Coronary Artery Disease Maternal Grandfather      40's, smoker/heavy drinker     Hypertension No family hx of      Hyperlipidemia No family hx of      Cerebrovascular Disease No family hx of      Breast Cancer No family hx of      Colon Cancer No family hx of      Prostate Cancer No family hx of            Reviewed and updated as needed this visit by clinical staff  Tobacco  Allergies  Meds  Med Hx  Surg Hx  Fam Hx  Soc Hx      Reviewed and updated as needed this visit by Provider  Allergies         ROS:  Constitutional, HEENT, cardiovascular, pulmonary, gi and gu systems are negative, except as otherwise noted.    OBJECTIVE:     /68  Pulse 75  Temp 98.3  F (36.8  C) (Oral)  Resp 14  Ht 6' 3\" (1.905 m)  Wt 214 lb (97.1 kg)  SpO2 " 97%  BMI 26.75 kg/m2  Body mass index is 26.75 kg/(m^2).  GENERAL: alert and no distress  RESP: lungs clear to auscultation - no rales, rhonchi or wheezes  CV: regular rate and rhythm, normal S1 S2, no S3 or S4, no murmur, click or rub, no peripheral edema  MS: no gross musculoskeletal defects noted  NEURO: Normal strength and tone, mentation intact and speech normal  PSYCH: mentation appears normal, affect normal/bright    Diagnostic Test Results:  PHQ-9 SCORE 10/31/2014 7/31/2018 9/4/2018   Total Score 0 - -   Total Score MyChart - 5 (Mild depression) -   Total Score - 5 2     PRABHJOT-7 SCORE 7/31/2018 9/4/2018 10/10/2018   Total Score - - -   Total Score 8 (mild anxiety) - -   Total Score 8 4 2           ASSESSMENT/PLAN:             1. Mild episode of recurrent major depressive disorder (H)  Stable on medication   - propranolol (INDERAL) 10 MG tablet; Take 1 tablet (10 mg) by mouth 3 times daily as needed (anxiety)  Dispense: 90 tablet; Refill: 1  - sertraline (ZOLOFT) 50 MG tablet; Take 1 tablet (50 mg) by mouth daily  Dispense: 90 tablet; Refill: 3    2. Anxiety  Stable on medication   Using propranolol sparingly   - propranolol (INDERAL) 10 MG tablet; Take 1 tablet (10 mg) by mouth 3 times daily as needed (anxiety)  Dispense: 90 tablet; Refill: 1  - sertraline (ZOLOFT) 50 MG tablet; Take 1 tablet (50 mg) by mouth daily  Dispense: 90 tablet; Refill: 3    Patient Instructions   med refill        STELLA Garvey [facility]

## 2018-10-10 NOTE — MR AVS SNAPSHOT
After Visit Summary   10/10/2018    Eric Mandel    MRN: [id number]           Patient Information     Date Of Birth          1980        Visit Information        Provider Department      10/10/2018 7:20 AM Maddie Adam APRN CNP [facility]        Today's Diagnoses     Mild episode of recurrent major depressive disorder (H)        Anxiety          Care Instructions    med refill            Follow-ups after your visit        Your next 10 appointments already scheduled     Mar 04, 2019  8:00 AM CST   Telephone Visit with Elvira Riley PA-C   [facility]    [address]  [facility] [address] [phone]           Note: this is not an onsite visit; there is no need to come to the facility.              Who to contact     If you have questions or need follow up information about today's clinic visit or your schedule please contact [facility] directly at [phone].  Normal or non-critical lab and imaging results will be communicated to you by Explore.To Yellow Pageshart, letter or phone within 4 business days after the clinic has received the results. If you do not hear from us within 7 days, please contact the clinic through Expl[employer] Pages[employer] or phone. If you have a critical or abnormal lab result, we will notify you by phone as soon as possible.  Submit refill requests through LiveDeal or call your pharmacy and they will forward the refill request to us. Please allow 3 business days for your refill to be completed.          Additional Information About Your Visit        Explore.To Yellow Pageshart Information     LiveDeal gives you secure access to your electronic health record. If you see a primary care provider, you can also send messages to your care team and make appointments. If you have questions, please call your primary care clinic.  If you do not have a primary care provider, please call [phone] and they will assist you.        Care  "EveryWhere ID     This is your Care EveryWhere ID. This could be used by other organizations to access your [facility] medical records  [id number]        Your Vitals Were     Pulse Temperature Respirations Height Pulse Oximetry BMI (Body Mass Index)    75 98.3  F (36.8  C) (Oral) 14 6' 3\" (1.905 m) 97% 26.75 kg/m2       Blood Pressure from Last 3 Encounters:   10/10/18 104/68   09/04/18 102/78   08/29/18 114/78    Weight from Last 3 Encounters:   10/10/18 214 lb (97.1 kg)   09/04/18 211 lb (95.7 kg)   08/29/18 208 lb (94.3 kg)              Today, you had the following     No orders found for display         Today's Medication Changes          These changes are accurate as of 10/10/18  8:12 AM.  If you have any questions, ask your nurse or doctor.               These medicines have changed or have updated prescriptions.        Dose/Directions    propranolol 10 MG tablet   Commonly known as:  INDERAL   This may have changed:  reasons to take this   Used for:  Mild episode of recurrent major depressive disorder (H), Anxiety   Changed by:  Maddie Adam APRN CNP        Dose:  10 mg   Take 1 tablet (10 mg) by mouth 3 times daily as needed (anxiety)   Quantity:  90 tablet   Refills:  1            Where to get your medicines      These medications were sent to [facility] [place] [id number]     Phone:  [phone]     propranolol 10 MG tablet    sertraline 50 MG tablet                Primary Care Provider Office Phone # Fax #    Josefina Schraderpee [facility] [phone]       [address] [facility] [address]        Equal Access to Services     [facility] Center[facility]RISTIAN AH: Hadii ramu wilburno Sojuhi, waaxda luqadaha, qaybta kaalmada adeegyada, dominique stevenson. So [facility] [phone].    ATENCIÓN: Si habla español, tiene a medina disposición servicios gratuitos de asistencia lingüística. Llame al " [phone].    We comply with applicable federal civil rights laws and [place] laws. We do not discriminate on the basis of race, color, national origin, age, disability, sex, sexual orientation, or gender identity.            Thank you!     Thank you for choosing [facility]  for your care. Our goal is always to provide you with excellent care. Hearing back from our patients is one way we can continue to improve our services. Please take a few minutes to complete the written survey that you may receive in the mail after your visit with us. Thank you!             Your Updated Medication List - Protect others around you: Learn how to safely use, store and throw away your medicines at www.disposemymeds.org.          This list is accurate as of 10/10/18  8:12 AM.  Always use your most recent med list.                   Brand Name Dispense Instructions for use Diagnosis    propranolol 10 MG tablet    INDERAL    90 tablet    Take 1 tablet (10 mg) by mouth 3 times daily as needed (anxiety)    Mild episode of recurrent major depressive disorder (H), Anxiety       sertraline 50 MG tablet    ZOLOFT    90 tablet    Take 1 tablet (50 mg) by mouth daily    Mild episode of recurrent major depressive disorder (H), Anxiety       triamcinolone 0.1 % cream    KENALOG    30 g    Apply sparingly to affected area three times daily for 14 days.    Other eczema

## 2018-10-10 NOTE — NURSING NOTE
"Chief Complaint   Patient presents with     Recheck Medication     sertraline     initial /68  Pulse 75  Temp 98.3  F (36.8  C) (Oral)  Resp 14  Ht 6' 3\" (1.905 m)  Wt 214 lb (97.1 kg)  SpO2 97%  BMI 26.75 kg/m2 Estimated body mass index is 26.75 kg/(m^2) as calculated from the following:    Height as of this encounter: 6' 3\" (1.905 m).    Weight as of this encounter: 214 lb (97.1 kg)..  bp completed using cuff size large  VIPUL BARRAGAN LPN  "

## 2018-10-11 ASSESSMENT — ANXIETY QUESTIONNAIRES: GAD7 TOTAL SCORE: 2

## 2018-12-21 DIAGNOSIS — F33.0 MILD EPISODE OF RECURRENT MAJOR DEPRESSIVE DISORDER (H): ICD-10-CM

## 2018-12-21 DIAGNOSIS — F41.9 ANXIETY: ICD-10-CM

## 2018-12-21 NOTE — TELEPHONE ENCOUNTER
"Requested Prescriptions   Pending Prescriptions Disp Refills     sertraline (ZOLOFT) 50 MG tablet  Last Written Prescription Date:  10/10/18  Last Fill Quantity: 90,  # refills: 3   Last office visit: 10/10/2018 with prescribing provider:  Jair   Future Office Visit:   Next 5 appointments (look out 90 days)    Mar 04, 2019  8:00 AM CST  Telephone Visit with Elvira Riley PA-C  [facility] [address] [facility] [address] [phone]          90 tablet 3     Sig: Take 1 tablet (50 mg) by mouth daily    SSRIs Protocol Passed - 12/21/2018 12:34 PM       Passed - PHQ-9 score less than 5 in past 6 months    Please review last PHQ-9 score.          Passed - Patient is age 18 or older       Passed - Recent (6 mo) or future (30 days) visit within the authorizing provider's specialty    Patient had office visit in the last 6 months or has a visit in the next 30 days with authorizing provider or within the authorizing provider's specialty.  See \"Patient Info\" tab in inbasket, or \"Choose Columns\" in Meds & Orders section of the refill encounter.              "

## 2019-08-19 ENCOUNTER — TELEPHONE (OUTPATIENT)
Dept: FAMILY MEDICINE | Facility: CLINIC | Age: 39
End: 2019-08-19

## 2019-08-19 NOTE — LETTER
[facility]  [address] [facility] [id number] [phone]  August 19, 2019    Eric Mandel  [address]   [facility] [address]    Dear Eric,    I care about your health and have reviewed your health plan. I have reviewed your medical conditions, medication list, and lab results and am making recommendations based on this review, to better manage your health.    You are in particular need of attention regarding:  -Wellness (Physical) Visit     I am recommending that you:  -schedule a WELLNESS (Physical) APPOINTMENT with me.   I will check fasting labs the same day - nothing to eat except water and meds for 8-10 hours prior.      Here is a list of Health Maintenance topics that are due now or due soon:  Health Maintenance Due   Topic Date Due     PHQ-9  03/04/2019     PREVENTIVE CARE VISIT  09/04/2019     Please call us at [phone] (or use [email or web address]) to address the above recommendations.     Thank you for trusting [facility] and we appreciate the opportunity to serve you.  We look forward to supporting your healthcare needs in the future.    Healthy Regards,    Bran Bonilla, DO

## 2019-08-19 NOTE — TELEPHONE ENCOUNTER
Needs of attention regarding:  -Depression  -Wellness (Physical) Visit     Health Maintenance Topics with due status: Overdue       Topic Date Due    PHQ-9 03/04/2019     Health Maintenance Topics with due status: Due Soon       Topic Date Due    PREVENTIVE CARE VISIT 09/04/2019       Communication:  See Letter

## 2019-10-09 ENCOUNTER — TRANSFERRED RECORDS (OUTPATIENT)
Dept: HEALTH INFORMATION MANAGEMENT | Facility: CLINIC | Age: 39
End: 2019-10-09

## 2019-10-15 ENCOUNTER — TRANSFERRED RECORDS (OUTPATIENT)
Dept: HEALTH INFORMATION MANAGEMENT | Facility: CLINIC | Age: 39
End: 2019-10-15

## 2019-11-29 NOTE — PROGRESS NOTES
3  SUBJECTIVE:   CC: Eric Mandel is an 39 year old male who presents for preventive health visit.     Healthy Habits:    Do you get at least three servings of calcium containing foods daily (dairy, green leafy vegetables, etc.)? yes    Amount of exercise or daily activities, outside of work: 7 day(s) per week    Problems taking medications regularly No    Medication side effects: No    Have you had an eye exam in the past two years? yes    Do you see a dentist twice per year? yes    Do you have sleep apnea, excessive snoring or daytime drowsiness?no    -Has a bump on his arm - painless - noticed it about a month ago. Wife noticed it while he was doing dishes. Causes him no symptoms at all. Admits that since she has had cancer he has become more wary of lumps and bump so figured he would mention it. Continues to have L cervical noted since 1997 that remains enlarged, but otherwise unchanged. He continues to decline ENT consult, but agrees to follow-up anytime if he has concerns.    -Did a biometric screening through work 1 month ago and reports normal labs. Will send us copy of his labs.     -Had an issue with his knee 1.5 months ago. Active with weight lifting and rowing. Office is next to [facility] in [place] so went in for evaluation. Had x-ray and MRI. Found to have small tear in his meniscus and small piece of cartilage at the back of the knee. Within 1 month was better. Also saw his chiropractor and had treated with cold laser. No concerns with this, but wanted to update us.    -Had been on zoloft for mood. Stopped taking zoloft once his wife's cancer was in remission around springtime. 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   Still has propranolol on file and keeps this around for rare panic attacks. Still finds this helpful and just likes to keep on hand.       Today's PHQ-2 Score:   PHQ-2 ( 1999 Pfizer) 7/31/2018 2/14/2017    Q1: Little interest or pleasure in doing things 1 0   Q2: Feeling down, depressed or hopeless 2 0   PHQ-2 Score 3 0   Q1: Little interest or pleasure in doing things - -   Q2: Feeling down, depressed or hopeless - -   PHQ-2 Score - -       Abuse: Current or Past(Physical, Sexual or Emotional)- No  Do you feel safe in your environment? Yes      Social History     Tobacco Use     Smoking status: Former Smoker     Types: Cigarettes     Last attempt to quit: 2000     Years since quittin.6     Smokeless tobacco: Never Used   Substance Use Topics     Alcohol use: Yes     Comment: Occasional     If you drink alcohol do you typically have >3 drinks per day or >7 drinks per week? No                      Last PSA: No results found for: PSA    Reviewed orders with patient. Reviewed health maintenance and updated orders accordingly - Yes  Patient Active Problem List   Diagnosis     CARDIOVASCULAR SCREENING; LDL GOAL LESS THAN 160     Anxiety     Major depression in complete remission (H) - more situational related to wife's cancer. improved once she went into remission.     Other eczema - R ankle; uses topical steriod periodically     Lymph node enlargement - L anterior superior cervical chain. Pt reports started after strep episode in  and has been unchanged since.      Localized superficial swelling, mass, or lump - R distal upper arm. ?epidermoid cyst versus less likely other.     Past Surgical History:   Procedure Laterality Date     NO HISTORY OF SURGERY         Social History     Tobacco Use     Smoking status: Former Smoker     Types: Cigarettes     Last attempt to quit: 2000     Years since quittin.6     Smokeless tobacco: Never Used   Substance Use Topics     Alcohol use: Yes     Comment: Occasional     Family History   Problem Relation Age of Onset     Diabetes Paternal Grandmother      Thyroid Disease Paternal Grandmother         non cancerous     Diabetes Paternal Grandfather      Coronary  "Artery Disease Maternal Grandfather         40's, smoker/heavy drinker     Colon Polyps Father      Hypertension No family hx of      Hyperlipidemia No family hx of      Cerebrovascular Disease No family hx of      Breast Cancer No family hx of      Colon Cancer No family hx of      Prostate Cancer No family hx of          No current outpatient medications on file.     No Known Allergies    Reviewed and updated as needed this visit by clinical staff  Tobacco  Allergies  Meds  Problems  Med Hx  Surg Hx  Fam Hx  Soc Hx          Reviewed and updated as needed this visit by Provider  Tobacco  Allergies  Meds  Problems  Med Hx  Surg Hx  Fam Hx  Soc Hx             ROS:  CONSTITUTIONAL: NEGATIVE for fever, chills, change in weight  INTEGUMENTARY/SKIN: + for small skin lump; see notes in HPI. NEGATIVE for worrisome rashes, moles or lesions  EYES: NEGATIVE for vision changes or irritation  ENT: + for chronic cervical lymph node, see HPI. NEGATIVE for ear, mouth and throat problems  RESP: NEGATIVE for significant cough or SOB  CV: NEGATIVE for chest pain, palpitations or peripheral edema  GI: NEGATIVE for nausea, abdominal pain, heartburn, or change in bowel habits   male: negative for dysuria, hematuria, decreased urinary stream, erectile dysfunction, urethral discharge  MUSCULOSKELETAL: + for knee pain, see notes in HPI. NEGATIVE for significant arthralgias or myalgia  NEURO: NEGATIVE for weakness, dizziness or paresthesias  PSYCHIATRIC: NEGATIVE for changes in mood or affect    OBJECTIVE:   /72 (BP Location: Right arm, Cuff Size: Adult Regular)   Pulse 106   Temp 98.4  F (36.9  C) (Oral)   Ht 1.905 m (6' 3\")   Wt 103 kg (227 lb)   SpO2 98%   BMI 28.37 kg/m    EXAM:  GENERAL: healthy, alert and no distress  EYES: Eyes grossly normal to inspection, PERRL and conjunctivae and sclerae normal  HENT: ear canals and TM's normal, nose and mouth without ulcers or lesions  NECK: continues to have obvious " L anterior superior cervical chain enlarged lymph node, but this is non-tender and pt reports is unchanged from 1997. Otherwise, no asymmetry, masses, or scars and thyroid normal to palpation  RESP: lungs clear to auscultation - no rales, rhonchi or wheezes  CV: regular rate and rhythm, normal S1 S2, no S3 or S4, no murmur, click or rub, no peripheral edema and peripheral pulses strong  ABDOMEN: soft, nontender, no hepatosplenomegaly, no masses and bowel sounds normal  MS: no gross musculoskeletal defects noted, no edema  SKIN: 1cm superficial lump along distal aspect of R upper lateral arm 3 inches superior to lateral epicondyle. no suspicious lesions or rashes  NEURO: Normal strength and tone, mentation intact and speech normal  PSYCH: mentation appears normal, affect normal/bright    Diagnostic Test Results:  Labs reviewed in Epic  See flowsheets for PHQ/PRABHJOT scores.      ASSESSMENT/PLAN:       ICD-10-CM    1. Routine general medical examination at a health care facility Z00.00    2. Localized superficial swelling, mass, or lump - R distal upper arm. ?epidermoid cyst versus less likely other. R22.9    3. Lymph node enlargement - L anterior superior cervical chain. Pt reports started after strep episode in 1997 and has been unchanged since.  R59.9    4. Major depression in complete remission (H) - more situational related to wife's cancer. improved once she went into remission. F32.5    5. CARDIOVASCULAR SCREENING; LDL GOAL LESS THAN 160 Z13.6    Offered referral again for ENT for chronic L cervical enlarged lymph node, but pt declines as this has been stable since 1997 and has caused him no issues otherwise. He will followup anytime with concerns.  Additionally offered referral to derm for superficial lump along R distal upper arm. This seems most c/w an epidermoid cyst, but pt aware DDx could include lymph node versus other skin cyst. He would just like to monitor for now as is asymptomatic, but will let me know  "if he changes his mind.  Reports biometric screening completed through work and will have copy sent for us to keep for his records.  Declines further mood intervention as feels depression is in remission now that wife has been doing well and is also in remission from her cancer. He still likes to keep inderal on hand for intermittent anxiety attacks, but really hasn't needed this. Will let me know if needs a refill.     COUNSELING:  Reviewed preventive health counseling, as reflected in patient instructions       Regular exercise       Healthy diet/nutrition    Estimated body mass index is 28.37 kg/m  as calculated from the following:    Height as of this encounter: 1.905 m (6' 3\").    Weight as of this encounter: 103 kg (227 lb).    Weight management plan: Discussed healthy diet and exercise guidelines     reports that he quit smoking about 19 years ago. His smoking use included cigarettes. He has never used smokeless tobacco.      Counseling Resources:  ATP IV Guidelines  Pooled Cohorts Equation Calculator  FRAX Risk Assessment  ICSI Preventive Guidelines  Dietary Guidelines for Americans, 2010  USDA's MyPlate  ASA Prophylaxis  Lung CA Screening    Elvira Riley PA-C  [facility]  "

## 2019-12-02 ENCOUNTER — OFFICE VISIT (OUTPATIENT)
Dept: FAMILY MEDICINE | Facility: CLINIC | Age: 39
End: 2019-12-02
Payer: COMMERCIAL

## 2019-12-02 VITALS
SYSTOLIC BLOOD PRESSURE: 124 MMHG | HEART RATE: 106 BPM | WEIGHT: 227 LBS | OXYGEN SATURATION: 98 % | TEMPERATURE: 98.4 F | HEIGHT: 75 IN | BODY MASS INDEX: 28.23 KG/M2 | DIASTOLIC BLOOD PRESSURE: 72 MMHG

## 2019-12-02 DIAGNOSIS — R22.9 LOCALIZED SUPERFICIAL SWELLING, MASS, OR LUMP: ICD-10-CM

## 2019-12-02 DIAGNOSIS — Z13.6 CARDIOVASCULAR SCREENING; LDL GOAL LESS THAN 160: ICD-10-CM

## 2019-12-02 DIAGNOSIS — R59.9 LYMPH NODE ENLARGEMENT: ICD-10-CM

## 2019-12-02 DIAGNOSIS — F32.5 MAJOR DEPRESSION IN COMPLETE REMISSION (H): ICD-10-CM

## 2019-12-02 DIAGNOSIS — Z00.00 ROUTINE GENERAL MEDICAL EXAMINATION AT A HEALTH CARE FACILITY: Primary | ICD-10-CM

## 2019-12-02 PROCEDURE — 99396 PREV VISIT EST AGE 40-64: CPT | Performed by: PHYSICIAN ASSISTANT

## 2019-12-02 ASSESSMENT — ANXIETY QUESTIONNAIRES
1. FEELING NERVOUS, ANXIOUS, OR ON EDGE: SEVERAL DAYS
2. NOT BEING ABLE TO STOP OR CONTROL WORRYING: NOT AT ALL
GAD7 TOTAL SCORE: 2
IF YOU CHECKED OFF ANY PROBLEMS ON THIS QUESTIONNAIRE, HOW DIFFICULT HAVE THESE PROBLEMS MADE IT FOR YOU TO DO YOUR WORK, TAKE CARE OF THINGS AT HOME, OR GET ALONG WITH OTHER PEOPLE: NOT DIFFICULT AT ALL
5. BEING SO RESTLESS THAT IT IS HARD TO SIT STILL: NOT AT ALL
6. BECOMING EASILY ANNOYED OR IRRITABLE: SEVERAL DAYS
3. WORRYING TOO MUCH ABOUT DIFFERENT THINGS: NOT AT ALL
7. FEELING AFRAID AS IF SOMETHING AWFUL MIGHT HAPPEN: NOT AT ALL

## 2019-12-02 ASSESSMENT — PATIENT HEALTH QUESTIONNAIRE - PHQ9
5. POOR APPETITE OR OVEREATING: NOT AT ALL
SUM OF ALL RESPONSES TO PHQ QUESTIONS 1-9: 6

## 2019-12-02 ASSESSMENT — MIFFLIN-ST. JEOR: SCORE: 2025.3

## 2019-12-03 ASSESSMENT — ANXIETY QUESTIONNAIRES: GAD7 TOTAL SCORE: 2

## 2020-01-30 ENCOUNTER — OFFICE VISIT (OUTPATIENT)
Dept: FAMILY MEDICINE | Facility: CLINIC | Age: 40
End: 2020-01-30
Payer: COMMERCIAL

## 2020-01-30 VITALS — SYSTOLIC BLOOD PRESSURE: 124 MMHG | DIASTOLIC BLOOD PRESSURE: 82 MMHG

## 2020-01-30 DIAGNOSIS — D18.01 CHERRY ANGIOMA: ICD-10-CM

## 2020-01-30 DIAGNOSIS — L81.4 LENTIGINES: ICD-10-CM

## 2020-01-30 DIAGNOSIS — L81.0 POST-INFLAMMATORY HYPERPIGMENTATION: ICD-10-CM

## 2020-01-30 DIAGNOSIS — D22.9 MULTIPLE BENIGN NEVI: ICD-10-CM

## 2020-01-30 DIAGNOSIS — L90.5 ACNE SCARRING: ICD-10-CM

## 2020-01-30 DIAGNOSIS — Z80.8 FAMILY HISTORY OF NONMELANOMA SKIN CANCER: ICD-10-CM

## 2020-01-30 DIAGNOSIS — D48.5 NEOPLASM OF UNCERTAIN BEHAVIOR OF SKIN: ICD-10-CM

## 2020-01-30 DIAGNOSIS — L29.9 LOCALIZED PRURITUS: ICD-10-CM

## 2020-01-30 DIAGNOSIS — L70.0 ACNE VULGARIS: ICD-10-CM

## 2020-01-30 DIAGNOSIS — L28.0 LICHEN SIMPLEX CHRONICUS: Primary | ICD-10-CM

## 2020-01-30 PROCEDURE — 99214 OFFICE O/P EST MOD 30 MIN: CPT | Mod: 25 | Performed by: PHYSICIAN ASSISTANT

## 2020-01-30 PROCEDURE — 88305 TISSUE EXAM BY PATHOLOGIST: CPT | Mod: TC | Performed by: PHYSICIAN ASSISTANT

## 2020-01-30 PROCEDURE — 11102 TANGNTL BX SKIN SINGLE LES: CPT | Performed by: PHYSICIAN ASSISTANT

## 2020-01-30 RX ORDER — CLOBETASOL PROPIONATE 0.5 MG/G
CREAM TOPICAL 2 TIMES DAILY
Qty: 30 G | Refills: 1 | Status: SHIPPED | OUTPATIENT
Start: 2020-01-30

## 2020-01-30 NOTE — PROGRESS NOTES
HPI:  Eric Mandel is a 39 year old male patient here today for acne on back and face .  Patient states this has been present for years.  Patient reports the following symptoms: breakout .  Patient reports the following previous treatments: proactive with improvement.  Patient reports the following modifying factors: none.  Associated symptoms: none. Also has a rash on right ankle for years. treated with TAC1% with improvement but recurs. Would like a FBE today.  Patient has no other skin complaints today.  Remainder of the HPI, Meds, PMH, Allergies, FH, and SH was reviewed in chart.    Pertinent Hx:   Paternal aunt and father had a NMSC. No personal history of skin cancer.   Past Medical History:   Diagnosis Date     Lymph node enlargement - L anterior superior cervical chain. Pt reports started after strep episode in 1997 and has been unchanged since.  9/4/2018     NO ACTIVE PROBLEMS (aka NONE)        Past Surgical History:   Procedure Laterality Date     NO HISTORY OF SURGERY          Family History   Problem Relation Age of Onset     Diabetes Paternal Grandmother      Thyroid Disease Paternal Grandmother         non cancerous     Diabetes Paternal Grandfather      Coronary Artery Disease Maternal Grandfather         40's, smoker/heavy drinker     Colon Polyps Father      Skin Cancer Father      Skin Cancer Paternal Aunt      Hypertension No family hx of      Hyperlipidemia No family hx of      Cerebrovascular Disease No family hx of      Breast Cancer No family hx of      Colon Cancer No family hx of      Prostate Cancer No family hx of        Social History     Socioeconomic History     Marital status: Single     Spouse name: Not on file     Number of children: Not on file     Years of education: Not on file     Highest education level: Not on file   Occupational History     Not on file   Social Needs     Financial resource strain: Not on file     Food insecurity:     Worry: Not on file     Inability: Not on  file     Transportation needs:     Medical: Not on file     Non-medical: Not on file   Tobacco Use     Smoking status: Former Smoker     Types: Cigarettes     Last attempt to quit: 2000     Years since quittin.7     Smokeless tobacco: Never Used   Substance and Sexual Activity     Alcohol use: Yes     Comment: Occasional     Drug use: No     Sexual activity: Not Currently     Partners: Female     Birth control/protection: Female Surgical   Lifestyle     Physical activity:     Days per week: Not on file     Minutes per session: Not on file     Stress: Not on file   Relationships     Social connections:     Talks on phone: Not on file     Gets together: Not on file     Attends Temple service: Not on file     Active member of club or organization: Not on file     Attends meetings of clubs or organizations: Not on file     Relationship status: Not on file     Intimate partner violence:     Fear of current or ex partner: Not on file     Emotionally abused: Not on file     Physically abused: Not on file     Forced sexual activity: Not on file   Other Topics Concern     Parent/sibling w/ CABG, MI or angioplasty before 65F 55M? No   Social History Narrative     Not on file       Outpatient Encounter Medications as of 2020   Medication Sig Dispense Refill     Multiple Vitamins-Minerals (MULTIVITAMIN ADULT PO)        No facility-administered encounter medications on file as of 2020.        Review Of Systems:  Skin: acne on back and face, rash on right ankle  Eyes: negative  Ears/Nose/Throat: negative  Respiratory: No shortness of breath, dyspnea on exertion, cough, or hemoptysis  Cardiovascular: negative  Gastrointestinal: negative  Genitourinary: negative  Musculoskeletal: negative  Neurologic: negative  Psychiatric: negative  Hematologic/Lymphatic/Immunologic: negative  Endocrine: negative      Objective:     /82   Eyes: Conjunctivae/lids: Normal   ENT: Lips:  Normal  MSK: Normal  Cardiovascular:  Peripheral edema none  Pulm: Breathing Normal  Neuro/Psych: Orientation: A/O x 3. Normal; Mood/Affect: Normal, NAD, WDWN  Pt accompanied by: self  Following areas examined: Scalp, face, eyelids, lips, neck, chest, abdomen, back,  and R&L upper and lower extremities. Patient defers examination of buttock, hips, groin, and genitals.     Huber skin type:ii   Findings:  Red smooth well-defined macules on trunk and extremities.  Well circumscribed macules with symmetric color distribution on trunk and extremities.  Tan WD smooth macules on face, neck, trunk, and extremities.  Multiple inflammatory papules and Light brown/pink smooth macules on back  Few pustules on nose  Atrophic scarring on face  Pink scaly thickened skin on right lateral ankle  Dark brown smooth macule on left lumbar back 0.1cm    Assessment and Plan:     1) Cherry angiomas,  Benign nevi, Lentigines     I discussed the specifics of tumor, prognosis, and genetics of benign lesions.  I explained that treatment of these lesions would be purely cosmetic and not medically neccessary.  I discussed with patient different removal options including excision, cryotherapy, cautery and /or laser.  Lesion may recur and/or may not completely resolve. May need additional treatment.     2) Neoplasm of uncertain behavior on left lumbar back 0.1cm  Rule out atypical nevus  TANGENTIAL BIOPSY:  After consent, anesthesia with LEC and prep, tangential biopsy performed.  No complications and routine wound care.  May grow back and will get a scar. Based on lesion type may need to completely remove lesion. Patient will be notified in 7-10 days of results. Wound care directions given.    3) LSC and localized pruritis  Apply a thin layer of clobetasol to affected area on right ankle 2x a day for 12-3 weeks. Tapering with improvement. Do not use on face or body folds.  Discussed side effects of topical steroids including but not limited to atrophy (skin thinning), striae  (stretch marks) telangiectasias, steroid acne, and others. Do not apply to normal skin. Do not apply to discolored skin that does not have rash present. Educated patient on post inflammatory hyperpigmentation.   4) acne vulgaris, PIH, acne scarring  Disc topical and oral abx and accutnae.   Pt would like to try a stronger BPO wash.   Benzoyl peroxide 10% ( brand name Differin) if you are already using the 10% please call for topical and/or oral antibiotic  Disc side effects of oral antibiotics including N/V/D, rash, allergic reaction.   5) family hx of nmsc  Signs and Symptoms of non-melanoma skin cancer and ABCDEs of melanoma reviewed with patient. Patient encouraged to perform monthly self skin exams and educated on how to perform them. UV precautions reviewed with patient. Patient was asked about new or changing moles/lesions on body.   Wear a sunscreen with at least SPF 30 on your face, ears, neck and V of the chest daily. Wear sunscreen on other areas of the body if those areas are exposed to the sun throughout the day. Sunscreens can contain physical and/or chemical blockers. Physical blockers are less likely to clog pores, these include zinc oxide and titanium dioxide. Reapply every two hour and after swimming. Sunscreen examples include Neutrogena, CeraVe, Blue Lizard, Elta MD and many others.    Proper skin care from [facility]:    -Eliminate harsh soaps as they strip the natural oils from the skin, often resulting in dry itchy skin ( i.e. Dial, Zest, Lorena Spring)  -Use mild soaps such as Cetaphil or Dove Sensitive Skin in the shower. You do not need to use soap on arms, legs, and trunk every time you shower unless visibly soiled.   -Avoid hot or cold showers.  -After showering, lightly dry off and apply moisturizing within 2-3 minutes. This will help trap moisture in the skin.   -Aggressive use of a moisturizer at least 1-2 times a day to the entire body (including -Vanicream, Cetaphil, Aquaphor  or Cerave) and moisturize hands after every washing.  -We recommend using moisturizers that come in a tub that needs to be scooped out, not a pump. This has more of an oil base. It will hold moisture in your skin much better than a water base moisturizer. The above recommended are non-pore clogging.               Follow up in yearly FBE. 2-3 months if pt wants to start an oral abx for acne

## 2020-01-30 NOTE — PATIENT INSTRUCTIONS
Proper skin care from [facility]:    -Eliminate harsh soaps as they strip the natural oils from the skin, often resulting in dry itchy skin ( i.e. Dial, Zest, Lorena Spring)  -Use mild soaps such as Cetaphil or Dove Sensitive Skin in the shower. You do not need to use soap on arms, legs, and trunk every time you shower unless visibly soiled.   -Avoid hot or cold showers.  -After showering, lightly dry off and apply moisturizing within 2-3 minutes. This will help trap moisture in the skin.   -Aggressive use of a moisturizer at least 1-2 times a day to the entire body (including -Vanicream, Cetaphil, Aquaphor or Cerave) and moisturize hands after every washing.  -We recommend using moisturizers that come in a tub that needs to be scooped out, not a pump. This has more of an oil base. It will hold moisture in your skin much better than a water base moisturizer. The above recommended are non-pore clogging.      Wear a sunscreen with at least SPF 30 on your face, ears, neck and V of the chest daily. Wear sunscreen on other areas of the body if those areas are exposed to the sun throughout the day. Sunscreens can contain physical and/or chemical blockers. Physical blockers are less likely to clog pores, these include zinc oxide and titanium dioxide. Reapply every two hour and after swimming. Sunscreen examples include Neutrogena, CeraVe, Blue Lizard, Elta MD and many others.    UV radiation  UVA radiation remains constant throughout the day and throughout the year. It is a longer wavelength than UVB and therefore penetrates deeper into the skin leading to immediate and delayed tanning, photoaging, and skin cancer. 70-80% of UVA and UVB radiation occurs between the hours of 10am-2pm.  UVB radiation  UVB radiation causes the most harmful effects and is more significant during the summer months. However, snow and ice can reflect UVB radiation leading to skin damage during the winter months as well. UVB radiation is  responsible for tanning, burning, inflammation, delayed erythema (pinkness), pigmentation (brown spots), and skin cancer.   acne  Benzoyl peroxide 10% ( brand name Differin) if you are already using the 10% please call for topical and/or oral antibiotic  Disc side effects of oral antibiotics including N/V/D, rash, allergic reaction.     Rash on right ankle  Apply a thin layer of clobetasol to affected area on right ankle 2x a day for 12-3 weeks. Tapering with improvement. Do not use on face or body folds.  Apply vaseline 2x a day to area  Discussed side effects of topical steroids including but not limited to atrophy (skin thinning), striae (stretch marks) telangiectasias, steroid acne, and others. Do not apply to normal skin. Do not apply to discolored skin that does not have rash present. Educated patient on post inflammatory hyperpigmentation.                 Wound Care Instructions     FOR SUPERFICIAL WOUNDS     [facility] [phone]    [facility] [phone]          AFTER 24 HOURS YOU SHOULD REMOVE THE BANDAGE AND BEGIN DAILY DRESSING CHANGES AS FOLLOWS:     1) Remove Dressing.     2) Clean and dry the area with tap water using a Q-tip or sterile gauze pad.     3) Apply Vaseline, Aquaphor, Polysporin ointment or Bacitracin ointment over entire wound.  Do NOT use Neosporin ointment.     4) Cover the wound with a band-aid, or a sterile non-stick gauze pad and micropore paper tape      REPEAT THESE INSTRUCTIONS AT LEAST ONCE A DAY UNTIL THE WOUND HAS COMPLETELY HEALED.    It is an old wives tale that a wound heals better when it is exposed to air and allowed to dry out. The wound will heal faster with a better cosmetic result if it is kept moist with ointment and covered with a bandage.    **Do not let the wound dry out.**      Supplies Needed:      *Cotton tipped applicators (Q-tips)    *Polysporin Ointment or Bacitracin Ointment (NOT NEOSPORIN)    *Band-aids or non-stick gauze  pads and micropore paper tape.      PATIENT INFORMATION:    During the healing process you will notice a number of changes. All wounds develop a small halo of redness surrounding the wound.  This means healing is occurring. Severe itching with extensive redness usually indicates sensitivity to the ointment or bandage tape used to dress the wound.  You should call our office if this develops.      Swelling  and/or discoloration around your surgical site is common, particularly when performed around the eye.    All wounds normally drain.  The larger the wound the more drainage there will be.  After 7-10 days, you will notice the wound beginning to shrink and new skin will begin to grow.  The wound is healed when you can see skin has formed over the entire area.  A healed wound has a healthy, shiny look to the surface and is red to dark pink in color to normalize.  Wounds may take approximately 4-6 weeks to heal.  Larger wounds may take 6-8 weeks.  After the wound is healed you may discontinue dressing changes.    You may experience a sensation of tightness as your wound heals. This is normal and will gradually subside.    Your healed wound may be sensitive to temperature changes. This sensitivity improves with time, but if you re having a lot of discomfort, try to avoid temperature extremes.    Patients frequently experience itching after their wound appears to have healed because of the continue healing under the skin.  Plain Vaseline will help relieve the itching.        POSSIBLE COMPLICATIONS    BLEEDIN. Leave the bandage in place.  2. Use tightly rolled up gauze or a cloth to apply direct pressure over the bandage for 30  minutes.  3. Reapply pressure for an additional 30 minutes if necessary  4. Use additional gauze and tape to maintain pressure once the bleeding has stopped.

## 2020-01-30 NOTE — LETTER
1/30/2020         RE: Eric Mandel  [address] [place] [address]        Dear Colleague,    Thank you for referring your patient, Eric Mandel, to the [facility] [place] [place] [facility]. Please see a copy of my visit note below.    HPI:  Eric Mandel is a 39 year old male patient here today for acne on back and face .  Patient states this has been present for years.  Patient reports the following symptoms: breakout .  Patient reports the following previous treatments: proactive with improvement.  Patient reports the following modifying factors: none.  Associated symptoms: none. Also has a rash on right ankle for years. treated with TAC1% with improvement but recurs. Would like a FBE today.  Patient has no other skin complaints today.  Remainder of the HPI, Meds, PMH, Allergies, FH, and SH was reviewed in chart.    Pertinent Hx:   Paternal aunt and father had a NMSC. No personal history of skin cancer.   Past Medical History:   Diagnosis Date     Lymph node enlargement - L anterior superior cervical chain. Pt reports started after strep episode in 1997 and has been unchanged since.  9/4/2018     NO ACTIVE PROBLEMS (aka NONE)        Past Surgical History:   Procedure Laterality Date     NO HISTORY OF SURGERY          Family History   Problem Relation Age of Onset     Diabetes Paternal Grandmother      Thyroid Disease Paternal Grandmother         non cancerous     Diabetes Paternal Grandfather      Coronary Artery Disease Maternal Grandfather         40's, smoker/heavy drinker     Colon Polyps Father      Skin Cancer Father      Skin Cancer Paternal Aunt      Hypertension No family hx of      Hyperlipidemia No family hx of      Cerebrovascular Disease No family hx of      Breast Cancer No family hx of      Colon Cancer No family hx of      Prostate Cancer No family hx of        Social History     Socioeconomic History     Marital status: Single     Spouse name: Not on file     Number of children: Not on file      Years of education: Not on file     Highest education level: Not on file   Occupational History     Not on file   Social Needs     Financial resource strain: Not on file     Food insecurity:     Worry: Not on file     Inability: Not on file     Transportation needs:     Medical: Not on file     Non-medical: Not on file   Tobacco Use     Smoking status: Former Smoker     Types: Cigarettes     Last attempt to quit: 2000     Years since quittin.7     Smokeless tobacco: Never Used   Substance and Sexual Activity     Alcohol use: Yes     Comment: Occasional     Drug use: No     Sexual activity: Not Currently     Partners: Female     Birth control/protection: Female Surgical   Lifestyle     Physical activity:     Days per week: Not on file     Minutes per session: Not on file     Stress: Not on file   Relationships     Social connections:     Talks on phone: Not on file     Gets together: Not on file     Attends Druze service: Not on file     Active member of club or organization: Not on file     Attends meetings of clubs or organizations: Not on file     Relationship status: Not on file     Intimate partner violence:     Fear of current or ex partner: Not on file     Emotionally abused: Not on file     Physically abused: Not on file     Forced sexual activity: Not on file   Other Topics Concern     Parent/sibling w/ CABG, MI or angioplasty before 65F 55M? No   Social History Narrative     Not on file       Outpatient Encounter Medications as of 2020   Medication Sig Dispense Refill     Multiple Vitamins-Minerals (MULTIVITAMIN ADULT PO)        No facility-administered encounter medications on file as of 2020.        Review Of Systems:  Skin: acne on back and face, rash on right ankle  Eyes: negative  Ears/Nose/Throat: negative  Respiratory: No shortness of breath, dyspnea on exertion, cough, or hemoptysis  Cardiovascular: negative  Gastrointestinal: negative  Genitourinary:  negative  Musculoskeletal: negative  Neurologic: negative  Psychiatric: negative  Hematologic/Lymphatic/Immunologic: negative  Endocrine: negative      Objective:     /82   Eyes: Conjunctivae/lids: Normal   ENT: Lips:  Normal  MSK: Normal  Cardiovascular: Peripheral edema none  Pulm: Breathing Normal  Neuro/Psych: Orientation: A/O x 3. Normal; Mood/Affect: Normal, NAD, WDWN  Pt accompanied by: self  Following areas examined: Scalp, face, eyelids, lips, neck, chest, abdomen, back,  and R&L upper and lower extremities. Patient defers examination of buttock, hips, groin, and genitals.     Huber skin type:ii   Findings:  Red smooth well-defined macules on trunk and extremities.  Well circumscribed macules with symmetric color distribution on trunk and extremities.  Tan WD smooth macules on face, neck, trunk, and extremities.  Multiple inflammatory papules and Light brown/pink smooth macules on back  Few pustules on nose  Atrophic scarring on face  Pink scaly thickened skin on right lateral ankle  Dark brown smooth macule on left lumbar back 0.1cm    Assessment and Plan:     1) Cherry angiomas,  Benign nevi, Lentigines     I discussed the specifics of tumor, prognosis, and genetics of benign lesions.  I explained that treatment of these lesions would be purely cosmetic and not medically neccessary.  I discussed with patient different removal options including excision, cryotherapy, cautery and /or laser.  Lesion may recur and/or may not completely resolve. May need additional treatment.     2) Neoplasm of uncertain behavior on left lumbar back 0.1cm  Rule out atypical nevus  TANGENTIAL BIOPSY:  After consent, anesthesia with LEC and prep, tangential biopsy performed.  No complications and routine wound care.  May grow back and will get a scar. Based on lesion type may need to completely remove lesion. Patient will be notified in 7-10 days of results. Wound care directions given.    3) LSC and localized  pruritis  Apply a thin layer of clobetasol to affected area on right ankle 2x a day for 12-3 weeks. Tapering with improvement. Do not use on face or body folds.  Discussed side effects of topical steroids including but not limited to atrophy (skin thinning), striae (stretch marks) telangiectasias, steroid acne, and others. Do not apply to normal skin. Do not apply to discolored skin that does not have rash present. Educated patient on post inflammatory hyperpigmentation.   4) acne vulgaris, PIH, acne scarring  Disc topical and oral abx and accutnae.   Pt would like to try a stronger BPO wash.   Benzoyl peroxide 10% ( brand name Differin) if you are already using the 10% please call for topical and/or oral antibiotic  Disc side effects of oral antibiotics including N/V/D, rash, allergic reaction.   5) family hx of nmsc  Signs and Symptoms of non-melanoma skin cancer and ABCDEs of melanoma reviewed with patient. Patient encouraged to perform monthly self skin exams and educated on how to perform them. UV precautions reviewed with patient. Patient was asked about new or changing moles/lesions on body.   Wear a sunscreen with at least SPF 30 on your face, ears, neck and V of the chest daily. Wear sunscreen on other areas of the body if those areas are exposed to the sun throughout the day. Sunscreens can contain physical and/or chemical blockers. Physical blockers are less likely to clog pores, these include zinc oxide and titanium dioxide. Reapply every two hour and after swimming. Sunscreen examples include Neutrogena, CeraVe, Blue Lizard, Elta MD and many others.    Proper skin care from [facility]:    -Eliminate harsh soaps as they strip the natural oils from the skin, often resulting in dry itchy skin ( i.e. Dial, Zest, [place] Spring)  -Use mild soaps such as Cetaphil or Dove Sensitive Skin in the shower. You do not need to use soap on arms, legs, and trunk every time you shower unless visibly soiled.    -Avoid hot or cold showers.  -After showering, lightly dry off and apply moisturizing within 2-3 minutes. This will help trap moisture in the skin.   -Aggressive use of a moisturizer at least 1-2 times a day to the entire body (including -Vanicream, Cetaphil, Aquaphor or Cerave) and moisturize hands after every washing.  -We recommend using moisturizers that come in a tub that needs to be scooped out, not a pump. This has more of an oil base. It will hold moisture in your skin much better than a water base moisturizer. The above recommended are non-pore clogging.               Follow up in yearly FBE. 2-3 months if pt wants to start an oral abx for acne      Again, thank you for allowing me to participate in the care of your patient.        Sincerely,        Sada Pérez PA-C

## 2020-02-03 LAB — COPATH REPORT: NORMAL

## 2020-12-20 ENCOUNTER — HEALTH MAINTENANCE LETTER (OUTPATIENT)
Age: 40
End: 2020-12-20

## 2021-01-08 ENCOUNTER — OFFICE VISIT (OUTPATIENT)
Dept: FAMILY MEDICINE | Facility: CLINIC | Age: 41
End: 2021-01-08
Payer: COMMERCIAL

## 2021-01-08 VITALS
SYSTOLIC BLOOD PRESSURE: 124 MMHG | BODY MASS INDEX: 26.79 KG/M2 | HEIGHT: 75 IN | TEMPERATURE: 97.3 F | HEART RATE: 100 BPM | DIASTOLIC BLOOD PRESSURE: 78 MMHG | OXYGEN SATURATION: 100 % | WEIGHT: 215.44 LBS

## 2021-01-08 DIAGNOSIS — Z00.00 ROUTINE GENERAL MEDICAL EXAMINATION AT A HEALTH CARE FACILITY: Primary | ICD-10-CM

## 2021-01-08 DIAGNOSIS — R07.89 CHEST TIGHTNESS: ICD-10-CM

## 2021-01-08 DIAGNOSIS — Z12.11 SCREENING FOR COLON CANCER: ICD-10-CM

## 2021-01-08 DIAGNOSIS — Z11.59 NEED FOR HEPATITIS C SCREENING TEST: ICD-10-CM

## 2021-01-08 DIAGNOSIS — Z13.1 SCREENING FOR DIABETES MELLITUS: ICD-10-CM

## 2021-01-08 DIAGNOSIS — Z13.6 CARDIOVASCULAR SCREENING; LDL GOAL LESS THAN 160: ICD-10-CM

## 2021-01-08 PROCEDURE — 99213 OFFICE O/P EST LOW 20 MIN: CPT | Mod: 25 | Performed by: FAMILY MEDICINE

## 2021-01-08 PROCEDURE — 99396 PREV VISIT EST AGE 40-64: CPT | Performed by: FAMILY MEDICINE

## 2021-01-08 PROCEDURE — 93000 ELECTROCARDIOGRAM COMPLETE: CPT | Performed by: FAMILY MEDICINE

## 2021-01-08 ASSESSMENT — MIFFLIN-ST. JEOR: SCORE: 1967.85

## 2021-01-08 NOTE — PROGRESS NOTES
SUBJECTIVE:   CC: Eric Mandel is an 40 year old male who presents for preventative health visit.       Patient has been advised of split billing requirements and indicates understanding: Yes  Healthy Habits:    Getting at least 3 servings of Calcium per day:  Yes    Bi-annual eye exam:  NO    Dental care twice a year:  Yes    Sleep apnea or symptoms of sleep apnea:  None    Diet:  Regular (no restrictions)    Frequency of exercise:  6-7 days/week    Duration of exercise:  45-60 minutes    Taking medications regularly:  Not Applicable    Barriers to taking medications:  Not applicable    Medication side effects:  Not applicable    PHQ-2 Total Score:    Additional concerns today:  Yes         CHEST PAIN     Onset: months - possibly stress related busy time at work     Description:   Location:  Center of chest  Character: pressure- tightness   Radiation: no  Duration: 1 hours and waxing and waning - as the day goes on- while working in the afternoons- everyday this week. Usually notices while working. Hasn't noticed on the weekends when he doesn't work.     Intensity: moderate    Progression of Symptoms:  Constant- this week    Accompanying Signs & Symptoms:  Shortness of breath: no   Sweating: no   Nausea/vomiting: no   Lightheadedness: no   Palpitations: YES- has always had these on occasion once a month  Fever/Chills: no   Cough: no   Heartburn: no     History:   Family history of heart disease YES- maternal grandfather possibly   Tobacco use: no     Precipitating factors:   Worse with exertion: no   Worse with deep breaths :  no   Related to food: no     Alleviating factors:         Therapies Tried and outcome: nothing      Today's PHQ-2 Score:   PHQ-2 ( 1999 [employer]) 7/31/2018   Q1: Little interest or pleasure in doing things 1   Q2: Feeling down, depressed or hopeless 2   PHQ-2 Score 3   Q1: Little interest or pleasure in doing things -   Q2: Feeling down, depressed or hopeless -   PHQ-2 Score -       Abuse:  Current or Past(Physical, Sexual or Emotional)- No  Do you feel safe in your environment? Yes        Social History     Tobacco Use     Smoking status: Former Smoker     Types: Cigarettes     Quit date: 2000     Years since quittin.6     Smokeless tobacco: Never Used   Substance Use Topics     Alcohol use: Yes     Comment: Occasional     If you drink alcohol do you typically have >3 drinks per day or >7 drinks per week? Yes      AUDIT - Alcohol Use Disorders Identification Test - Reproduced from the World Health Organization Audit 2001 (Second Edition) 2021   1.  How often do you have a drink containing alcohol? 4 or more times a week   2.  How many drinks containing alcohol do you have on a typical day when you are drinking? 1 or 2   3.  How often do you have five or more drinks on one occasion? Less than monthly   5.  How often during the last year have you failed to do what was normally expected of you because of drinking? Never   6.  How often during the last year have you needed a first drink in the morning to get yourself going after a heavy drinking session? Never   7.  How often during the last year have you had a feeling of guilt or remorse after drinking? Never   8.  How often during the last year have you been unable to remember what happened the night before because of your drinking? Never   9.  Have you or someone else been injured because of your drinking? No   10. Has a relative, friend, doctor or other health care worker been concerned about your drinking or suggested you cut down? No       Last PSA: No results found for: PSA    Reviewed orders with patient. Reviewed health maintenance and updated orders accordingly - Yes  Lab work is in process    Reviewed and updated as needed this visit by clinical staff   Allergies  Meds              Reviewed and updated as needed this visit by Provider                Past Medical History:   Diagnosis Date     Lymph node enlargement - L anterior  "superior cervical chain. Pt reports started after strep episode in 1997 and has been unchanged since.  9/4/2018     NO ACTIVE PROBLEMS (aka NONE)       Past Surgical History:   Procedure Laterality Date     NO HISTORY OF SURGERY         Review of Systems  CONSTITUTIONAL: NEGATIVE for fever, chills, change in weight  INTEGUMENTARY/SKIN: NEGATIVE for worrisome rashes, moles or lesions  EYES: NEGATIVE for vision changes or irritation  ENT: NEGATIVE for ear, mouth and throat problems  RESP: NEGATIVE for significant cough or SOB  CV: NEGATIVE for chest pain, palpitations or peripheral edema  GI: NEGATIVE for nausea, abdominal pain, heartburn, or change in bowel habits   male: negative for dysuria, hematuria, decreased urinary stream, erectile dysfunction, urethral discharge  MUSCULOSKELETAL: NEGATIVE for significant arthralgias or myalgia  NEURO: NEGATIVE for weakness, dizziness or paresthesias  PSYCHIATRIC: NEGATIVE for changes in mood or affect    OBJECTIVE:   /78   Pulse 100   Temp 97.3  F (36.3  C)   Ht 1.905 m (6' 3\")   Wt 97.7 kg (215 lb 7 oz)   SpO2 100%   BMI 26.93 kg/m      Physical Exam  GENERAL: healthy, alert and no distress  EYES: Eyes grossly normal to inspection, PERRL and conjunctivae and sclerae normal  HENT: ear canals and TM's normal, nose and mouth without ulcers or lesions  NECK: no adenopathy, no asymmetry, masses, or scars and thyroid normal to palpation  RESP: lungs clear to auscultation - no rales, rhonchi or wheezes  CV: regular rate and rhythm, normal S1 S2, no S3 or S4, no murmur, click or rub, no peripheral edema and peripheral pulses strong  ABDOMEN: soft, nontender, no hepatosplenomegaly, no masses and bowel sounds normal  MS: no gross musculoskeletal defects noted, no edema  SKIN: no suspicious lesions or rashes  PSYCH: mentation appears normal, affect normal/bright    Diagnostic Test Results:  Labs reviewed in Epic    ASSESSMENT/PLAN:   1. Routine general medical examination " "at a health care facility: health maintenance reviewed and updated.    2. CARDIOVASCULAR SCREENING; LDL GOAL LESS THAN 160  - Lipid panel reflex to direct LDL Fasting; Future    3. Screening for diabetes mellitus  - Comprehensive metabolic panel (BMP + Alb, Alk Phos, ALT, AST, Total. Bili, TP); Future    4. Need for hepatitis C screening test  - Hepatitis C Screen Reflex to HCV RNA Quant and Genotype; Future    5. Chest tightness  - EKG 12-lead complete w/read - Clinics  - Echocardiogram Exercise Stress; Future    6. Screening for colon cancer  - GASTROENTEROLOGY ADULT REF PROCEDURE ONLY; Future    Patient has been advised of split billing requirements and indicates understanding: Yes  COUNSELING:   Reviewed preventive health counseling, as reflected in patient instructions       Regular exercise       Healthy diet/nutrition    Estimated body mass index is 26.93 kg/m  as calculated from the following:    Height as of this encounter: 1.905 m (6' 3\").    Weight as of this encounter: 97.7 kg (215 lb 7 oz).     Weight management plan: Discussed healthy diet and exercise guidelines    He reports that he quit smoking about 20 years ago. His smoking use included cigarettes. He has never used smokeless tobacco.      Counseling Resources:  ATP IV Guidelines  Pooled Cohorts Equation Calculator  FRAX Risk Assessment  ICSI Preventive Guidelines  Dietary Guidelines for Americans, 2010  USDA's MyPlate  ASA Prophylaxis  Lung CA Screening    Bran Bonilla DO  [facility] [place]  "

## 2021-01-12 ENCOUNTER — TELEPHONE (OUTPATIENT)
Dept: FAMILY MEDICINE | Facility: CLINIC | Age: 41
End: 2021-01-12

## 2021-01-13 ASSESSMENT — ANXIETY QUESTIONNAIRES
5. BEING SO RESTLESS THAT IT IS HARD TO SIT STILL: NOT AT ALL
3. WORRYING TOO MUCH ABOUT DIFFERENT THINGS: SEVERAL DAYS
IF YOU CHECKED OFF ANY PROBLEMS ON THIS QUESTIONNAIRE, HOW DIFFICULT HAVE THESE PROBLEMS MADE IT FOR YOU TO DO YOUR WORK, TAKE CARE OF THINGS AT HOME, OR GET ALONG WITH OTHER PEOPLE: NOT DIFFICULT AT ALL
GAD7 TOTAL SCORE: 5
7. FEELING AFRAID AS IF SOMETHING AWFUL MIGHT HAPPEN: SEVERAL DAYS
1. FEELING NERVOUS, ANXIOUS, OR ON EDGE: SEVERAL DAYS
2. NOT BEING ABLE TO STOP OR CONTROL WORRYING: NOT AT ALL
6. BECOMING EASILY ANNOYED OR IRRITABLE: SEVERAL DAYS

## 2021-01-13 ASSESSMENT — PATIENT HEALTH QUESTIONNAIRE - PHQ9
SUM OF ALL RESPONSES TO PHQ QUESTIONS 1-9: 3
5. POOR APPETITE OR OVEREATING: SEVERAL DAYS

## 2021-01-14 ASSESSMENT — ANXIETY QUESTIONNAIRES: GAD7 TOTAL SCORE: 5

## 2021-01-20 DIAGNOSIS — Z11.59 NEED FOR HEPATITIS C SCREENING TEST: ICD-10-CM

## 2021-01-20 DIAGNOSIS — Z13.6 CARDIOVASCULAR SCREENING; LDL GOAL LESS THAN 160: ICD-10-CM

## 2021-01-20 DIAGNOSIS — Z13.1 SCREENING FOR DIABETES MELLITUS: ICD-10-CM

## 2021-01-20 LAB
ALBUMIN SERPL-MCNC: 4.1 G/DL (ref 3.4–5)
ALP SERPL-CCNC: 77 U/L (ref 40–150)
ALT SERPL W P-5'-P-CCNC: 35 U/L (ref 0–70)
ANION GAP SERPL CALCULATED.3IONS-SCNC: 3 MMOL/L (ref 3–14)
AST SERPL W P-5'-P-CCNC: 17 U/L (ref 0–45)
BILIRUB SERPL-MCNC: 0.8 MG/DL (ref 0.2–1.3)
BUN SERPL-MCNC: 17 MG/DL (ref 7–30)
CALCIUM SERPL-MCNC: 9 MG/DL (ref 8.5–10.1)
CHLORIDE SERPL-SCNC: 104 MMOL/L (ref 94–109)
CHOLEST SERPL-MCNC: 136 MG/DL
CO2 SERPL-SCNC: 31 MMOL/L (ref 20–32)
CREAT SERPL-MCNC: 1.1 MG/DL (ref 0.66–1.25)
GFR SERPL CREATININE-BSD FRML MDRD: 83 ML/MIN/{1.73_M2}
GLUCOSE SERPL-MCNC: 94 MG/DL (ref 70–99)
HCV AB SERPL QL IA: NONREACTIVE
HDLC SERPL-MCNC: 43 MG/DL
LDLC SERPL CALC-MCNC: 77 MG/DL
NONHDLC SERPL-MCNC: 93 MG/DL
POTASSIUM SERPL-SCNC: 4 MMOL/L (ref 3.4–5.3)
PROT SERPL-MCNC: 7.4 G/DL (ref 6.8–8.8)
SODIUM SERPL-SCNC: 138 MMOL/L (ref 133–144)
TRIGL SERPL-MCNC: 82 MG/DL

## 2021-01-20 PROCEDURE — 36415 COLL VENOUS BLD VENIPUNCTURE: CPT | Performed by: FAMILY MEDICINE

## 2021-01-20 PROCEDURE — 86803 HEPATITIS C AB TEST: CPT | Performed by: FAMILY MEDICINE

## 2021-01-20 PROCEDURE — 80053 COMPREHEN METABOLIC PANEL: CPT | Performed by: FAMILY MEDICINE

## 2021-01-20 PROCEDURE — 80061 LIPID PANEL: CPT | Performed by: FAMILY MEDICINE

## 2021-02-04 ENCOUNTER — HOSPITAL ENCOUNTER (OUTPATIENT)
Dept: CARDIOLOGY | Facility: CLINIC | Age: 41
Discharge: HOME | End: 2021-02-04
Attending: FAMILY MEDICINE | Admitting: FAMILY MEDICINE
Payer: COMMERCIAL

## 2021-02-04 DIAGNOSIS — R07.89 CHEST TIGHTNESS: ICD-10-CM

## 2021-02-04 PROCEDURE — 93350 STRESS TTE ONLY: CPT | Mod: 26 | Performed by: INTERNAL MEDICINE

## 2021-02-04 PROCEDURE — 93016 CV STRESS TEST SUPVJ ONLY: CPT | Performed by: INTERNAL MEDICINE

## 2021-02-04 PROCEDURE — 93325 DOPPLER ECHO COLOR FLOW MAPG: CPT | Mod: TC

## 2021-02-04 PROCEDURE — 93018 CV STRESS TEST I&R ONLY: CPT | Performed by: INTERNAL MEDICINE

## 2021-02-04 PROCEDURE — 93350 STRESS TTE ONLY: CPT | Mod: TC

## 2021-02-04 PROCEDURE — 93321 DOPPLER ECHO F-UP/LMTD STD: CPT | Mod: 26 | Performed by: INTERNAL MEDICINE

## 2021-02-04 PROCEDURE — 93325 DOPPLER ECHO COLOR FLOW MAPG: CPT | Mod: 26 | Performed by: INTERNAL MEDICINE

## 2021-03-18 ENCOUNTER — IMMUNIZATION (OUTPATIENT)
Dept: NURSING | Facility: CLINIC | Age: 41
End: 2021-03-18
Payer: COMMERCIAL

## 2021-03-18 PROCEDURE — 91301 PR COVID VAC MODERNA 100 MCG/0.5 ML IM: CPT

## 2021-03-18 PROCEDURE — 0011A PR COVID VAC MODERNA 100 MCG/0.5 ML IM: CPT

## 2021-04-15 ENCOUNTER — IMMUNIZATION (OUTPATIENT)
Dept: NURSING | Facility: CLINIC | Age: 41
End: 2021-04-15
Attending: INTERNAL MEDICINE
Payer: COMMERCIAL

## 2021-04-15 PROCEDURE — 91301 PR COVID VAC MODERNA 100 MCG/0.5 ML IM: CPT

## 2021-04-15 PROCEDURE — 0012A PR COVID VAC MODERNA 100 MCG/0.5 ML IM: CPT

## 2021-10-18 ENCOUNTER — OFFICE VISIT (OUTPATIENT)
Dept: FAMILY MEDICINE | Facility: CLINIC | Age: 41
End: 2021-10-18
Payer: COMMERCIAL

## 2021-10-18 VITALS
TEMPERATURE: 97.2 F | DIASTOLIC BLOOD PRESSURE: 82 MMHG | BODY MASS INDEX: 26.61 KG/M2 | OXYGEN SATURATION: 97 % | HEIGHT: 75 IN | WEIGHT: 214 LBS | SYSTOLIC BLOOD PRESSURE: 130 MMHG | HEART RATE: 89 BPM | RESPIRATION RATE: 16 BRPM

## 2021-10-18 DIAGNOSIS — Z12.5 SCREENING FOR PROSTATE CANCER: ICD-10-CM

## 2021-10-18 DIAGNOSIS — Z83.719 FAMILY HISTORY OF COLONIC POLYPS: ICD-10-CM

## 2021-10-18 DIAGNOSIS — Z13.6 CARDIOVASCULAR SCREENING; LDL GOAL LESS THAN 160: ICD-10-CM

## 2021-10-18 DIAGNOSIS — Z12.11 SCREENING FOR COLON CANCER: ICD-10-CM

## 2021-10-18 DIAGNOSIS — Z00.00 ROUTINE GENERAL MEDICAL EXAMINATION AT A HEALTH CARE FACILITY: Primary | ICD-10-CM

## 2021-10-18 DIAGNOSIS — Z13.1 SCREENING FOR DIABETES MELLITUS: ICD-10-CM

## 2021-10-18 DIAGNOSIS — F32.5 MAJOR DEPRESSION IN COMPLETE REMISSION (H): ICD-10-CM

## 2021-10-18 PROCEDURE — 99396 PREV VISIT EST AGE 40-64: CPT | Performed by: FAMILY MEDICINE

## 2021-10-18 ASSESSMENT — ENCOUNTER SYMPTOMS
NERVOUS/ANXIOUS: 1
MYALGIAS: 0
FEVER: 0
PARESTHESIAS: 0
FREQUENCY: 0
SHORTNESS OF BREATH: 0
DIARRHEA: 0
SORE THROAT: 0
HEMATURIA: 0
WEAKNESS: 0
EYE PAIN: 0
HEMATOCHEZIA: 1
JOINT SWELLING: 0
DIZZINESS: 0
CHILLS: 0
ABDOMINAL PAIN: 0
HEARTBURN: 0
PALPITATIONS: 0
ARTHRALGIAS: 0
CONSTIPATION: 0
HEADACHES: 0
NAUSEA: 0
DYSURIA: 0
COUGH: 0

## 2021-10-18 ASSESSMENT — MIFFLIN-ST. JEOR: SCORE: 1961.33

## 2021-10-18 NOTE — PROGRESS NOTES
SUBJECTIVE:   CC: Eric Mandel is an 40 year old male who presents for preventative health visit.       Patient has been advised of split billing requirements and indicates understanding: Yes     Healthy Habits:     Getting at least 3 servings of Calcium per day:  Yes    Bi-annual eye exam:  Yes    Dental care twice a year:  NO    Sleep apnea or symptoms of sleep apnea:  None    Diet:  Regular (no restrictions)    Frequency of exercise:  6-7 days/week    Duration of exercise:  45-60 minutes    Taking medications regularly:  Yes    Medication side effects:  None    PHQ-2 Total Score: 2    Additional concerns today:  Yes    Left hand numbness - on thumb, sometimes index finger. Chiropractor thought a nerve could be pinched in his neck. Has a traction device at home which seems to help a little. Seems to be progressively getting worse over the past 3 weeks. No pain right now but sometimes does ache. Doesn't interfere with daily activities. Hasn't noticed any change in strength or ability to hold things. Will often wake up with an arm being numb but notices after sleeping on that slide.     Occasional BRBPR -- finds more common when needing to strain -- typically only one stool. No dark tarry stools.       Today's PHQ-2 Score:   PHQ-2 (  [employer]) 10/18/2021   Q1: Little interest or pleasure in doing things 1   Q2: Feeling down, depressed or hopeless 1   PHQ-2 Score 2   Q1: Little interest or pleasure in doing things Several days   Q2: Feeling down, depressed or hopeless Several days   PHQ-2 Score 2       Abuse: Current or Past(Physical, Sexual or Emotional)- No  Do you feel safe in your environment? Yes      Social History     Tobacco Use     Smoking status: Former Smoker     Types: Cigarettes     Quit date: 2000     Years since quittin.4     Smokeless tobacco: Never Used     Tobacco comment: smoked for a couple years in college -- doesn't think he smoked 100 cigarette   Substance Use Topics     Alcohol use:  "Yes     Comment: Occasional     If you drink alcohol do you typically have >3 drinks per day or >7 drinks per week? No    Alcohol Use 10/18/2021   Prescreen: >3 drinks/day or >7 drinks/week? Yes   Prescreen: >3 drinks/day or >7 drinks/week? -   AUDIT SCORE  5       Last PSA: No results found for: PSA    Reviewed orders with patient. Reviewed health maintenance and updated orders accordingly - Yes  Labs reviewed in EPIC    Reviewed and updated as needed this visit by clinical staff  Tobacco  Allergies  Meds   Med Hx  Surg Hx  Fam Hx  Soc Hx        Reviewed and updated as needed this visit by Provider        Fam Hx         Past Medical History:   Diagnosis Date     Lymph node enlargement - L anterior superior cervical chain. Pt reports started after strep episode in 1997 and has been unchanged since.  9/4/2018     NO ACTIVE PROBLEMS (aka NONE)       Past Surgical History:   Procedure Laterality Date     NO HISTORY OF SURGERY         Review of Systems   Constitutional: Negative for chills and fever.   HENT: Negative for congestion, ear pain, hearing loss and sore throat.    Eyes: Negative for pain and visual disturbance.   Respiratory: Negative for cough and shortness of breath.    Cardiovascular: Negative for chest pain, palpitations and peripheral edema.   Gastrointestinal: Positive for hematochezia. Negative for abdominal pain, constipation, diarrhea, heartburn and nausea.   Genitourinary: Negative for discharge, dysuria, frequency, genital sores, hematuria, impotence and urgency.   Musculoskeletal: Negative for arthralgias, joint swelling and myalgias.   Skin: Negative for rash.   Neurological: Negative for dizziness, weakness, headaches and paresthesias.   Psychiatric/Behavioral: Positive for mood changes. The patient is nervous/anxious.        OBJECTIVE:   /82   Pulse 89   Temp 97.2  F (36.2  C) (Tympanic)   Resp 16   Ht 1.905 m (6' 3\")   Wt 97.1 kg (214 lb)   SpO2 97%   BMI 26.75 kg/m  "     Physical Exam  GENERAL: healthy, alert and no distress  EYES: Eyes grossly normal to inspection, PERRL and conjunctivae and sclerae normal  HENT: ear canals and TM's normal, nose and mouth without ulcers or lesions  NECK: no adenopathy, no asymmetry, masses, or scars and thyroid normal to palpation  RESP: lungs clear to auscultation - no rales, rhonchi or wheezes  CV: regular rate and rhythm, normal S1 S2, no S3 or S4, no murmur, click or rub, no peripheral edema and peripheral pulses strong  ABDOMEN: soft, nontender, no hepatosplenomegaly, no masses and bowel sounds normal  MS: no gross musculoskeletal defects noted, no edema; Negative tinels, negative phalen; ?+ finklestein          ASSESSMENT/PLAN:       ICD-10-CM    1. Routine general medical examination at a health care facility  Z00.00    2. Family history of colonic polyps  Z83.71 Adult Gastro Ref - Procedure Only   3. Screening for colon cancer  Z12.11 Adult Gastro Ref - Procedure Only   4. Screening for prostate cancer  Z12.5 PSA, screen   5. CARDIOVASCULAR SCREENING; LDL GOAL LESS THAN 160  Z13.6 Lipid panel reflex to direct LDL Fasting   6. Screening for diabetes mellitus  Z13.1 Comprehensive metabolic panel (BMP + Alb, Alk Phos, ALT, AST, Total. Bili, TP)   7. Major depression in complete remission (H)  F32.5 MENTAL HEALTH REFERRAL  - Adult; Outpatient Treatment; Individual/Couples/Family/Group Therapy/Health Psychology; [facility] [phone]; We will contact you to schedule the appointment or please call with any questions   Health maintenance reviewed and updated. Overall, doing well. He is do for colon cancer due to family history. In the meantime, continue pushing water intake, increase fiber in diet. Will check baseline labs. Etiology for left finger symptoms -- ? Carpal tunnel vs Dequervain vs other neuropathy. Continue monitoring. He also requested referral for counseling to help with mood.    Patient has been advised of  "split billing requirements and indicates understanding: Yes  COUNSELING:   Reviewed preventive health counseling, as reflected in patient instructions    Estimated body mass index is 26.75 kg/m  as calculated from the following:    Height as of this encounter: 1.905 m (6' 3\").    Weight as of this encounter: 97.1 kg (214 lb).       He reports that he quit smoking about 21 years ago. His smoking use included cigarettes. He has never used smokeless tobacco.      Counseling Resources:  ATP IV Guidelines  Pooled Cohorts Equation Calculator  FRAX Risk Assessment  ICSI Preventive Guidelines  Dietary Guidelines for Americans, 2010  USDA's MyPlate  ASA Prophylaxis  Lung CA Screening    Bran Bonilla, [facility] [place]  "

## 2021-10-18 NOTE — PATIENT INSTRUCTIONS
Preventive Health Recommendations  Male Ages 40 to 49    Yearly exam:             See your health care provider every year in order to  o   Review health changes.   o   Discuss preventive care.    o   Review your medicines if your doctor has prescribed any.    You should be tested each year for STDs (sexually transmitted diseases) if you re at risk.     Have a cholesterol test every 5 years.     Have a colonoscopy (test for colon cancer) if someone in your family has had colon cancer or polyps before age 50.     After age 45, have a diabetes test (fasting glucose). If you are at risk for diabetes, you should have this test every 3 years.      Talk with your health care provider about whether or not a prostate cancer screening test (PSA) is right for you.    Shots: Get a flu shot each year. Get a tetanus shot every 10 years.     Nutrition:    Eat at least 5 servings of fruits and vegetables daily.     Eat whole-grain bread, whole-wheat pasta and brown rice instead of white grains and rice.     Get adequate Calcium and Vitamin D.     Lifestyle    Exercise for at least 150 minutes a week (30 minutes a day, 5 days a week). This will help you control your weight and prevent disease.     Limit alcohol to one drink per day.     No smoking.     Wear sunscreen to prevent skin cancer.     See your dentist every six months for an exam and cleaning.         [facility], [facility]  [address] [address]  Phone: [phone]  Fax: [phone]  Nurse Line: [phone]    [facility] [place] [facility] [place]  [address]  [place] [address]  Phone: [phone]  Fax: [phone]    [facility]  [address], [address]  [place] [address]  Phone: [phone]  Fax: [phone]    [facility]  [address]  [place] [address]  Phone: [phone]  Fax: [phone]    [facility]  [address],  [address]  [place] [address]  Phone: [phone]  Fax: [phone]

## 2021-10-19 ENCOUNTER — DOCUMENTATION ONLY (OUTPATIENT)
Dept: LAB | Facility: CLINIC | Age: 41
End: 2021-10-19

## 2021-10-19 PROBLEM — F32.9 MAJOR DEPRESSION: Status: ACTIVE | Noted: 2018-07-31

## 2021-10-19 NOTE — PROGRESS NOTES
Eric Mandel has an upcoming lab appointment:    Future Appointments   Date Time Provider Department Center   10/28/2021  8:45 AM RV LAB RVLABR RV     Patient is scheduled for the following lab(s):     Patient either has no future order, or has Health Maintenance labs due. Please review and place either future orders or HMPO (Review of Health Maintenance Protocol Orders), as appropriate.    Health Maintenance Due   Topic    ANNUAL REVIEW OF HM ORDERS      Sada Castillo

## 2021-10-19 NOTE — PROGRESS NOTES
Future labs (CMP,  Lipid, PSA) already ordered at preventive visit yesterday.    Bran Bonilla DO  10/19/2021 9:45 AM

## 2021-10-28 ENCOUNTER — LAB (OUTPATIENT)
Dept: LAB | Facility: CLINIC | Age: 41
End: 2021-10-28
Payer: COMMERCIAL

## 2021-10-28 DIAGNOSIS — Z13.1 SCREENING FOR DIABETES MELLITUS: ICD-10-CM

## 2021-10-28 DIAGNOSIS — Z12.5 SCREENING FOR PROSTATE CANCER: ICD-10-CM

## 2021-10-28 DIAGNOSIS — Z13.6 CARDIOVASCULAR SCREENING; LDL GOAL LESS THAN 160: ICD-10-CM

## 2021-10-28 PROCEDURE — G0103 PSA SCREENING: HCPCS

## 2021-10-28 PROCEDURE — 80061 LIPID PANEL: CPT

## 2021-10-28 PROCEDURE — 36415 COLL VENOUS BLD VENIPUNCTURE: CPT

## 2021-10-28 PROCEDURE — 80053 COMPREHEN METABOLIC PANEL: CPT

## 2021-10-29 LAB
ALBUMIN SERPL-MCNC: 4 G/DL (ref 3.4–5)
ALP SERPL-CCNC: 63 U/L (ref 40–150)
ALT SERPL W P-5'-P-CCNC: 32 U/L (ref 0–70)
ANION GAP SERPL CALCULATED.3IONS-SCNC: 3 MMOL/L (ref 3–14)
AST SERPL W P-5'-P-CCNC: 12 U/L (ref 0–45)
BILIRUB SERPL-MCNC: 0.8 MG/DL (ref 0.2–1.3)
BUN SERPL-MCNC: 16 MG/DL (ref 7–30)
CALCIUM SERPL-MCNC: 9 MG/DL (ref 8.5–10.1)
CHLORIDE BLD-SCNC: 104 MMOL/L (ref 94–109)
CHOLEST SERPL-MCNC: 148 MG/DL
CO2 SERPL-SCNC: 29 MMOL/L (ref 20–32)
CREAT SERPL-MCNC: 1.26 MG/DL (ref 0.66–1.25)
FASTING STATUS PATIENT QL REPORTED: YES
GFR SERPL CREATININE-BSD FRML MDRD: 70 ML/MIN/1.73M2
GLUCOSE BLD-MCNC: 87 MG/DL (ref 70–99)
HDLC SERPL-MCNC: 43 MG/DL
LDLC SERPL CALC-MCNC: 88 MG/DL
NONHDLC SERPL-MCNC: 105 MG/DL
POTASSIUM BLD-SCNC: 4.4 MMOL/L (ref 3.4–5.3)
PROT SERPL-MCNC: 7 G/DL (ref 6.8–8.8)
PSA SERPL-MCNC: 1.19 UG/L (ref 0–4)
SODIUM SERPL-SCNC: 136 MMOL/L (ref 133–144)
TRIGL SERPL-MCNC: 84 MG/DL

## 2021-11-02 ENCOUNTER — TELEPHONE (OUTPATIENT)
Dept: FAMILY MEDICINE | Facility: CLINIC | Age: 41
End: 2021-11-02

## 2021-11-02 NOTE — TELEPHONE ENCOUNTER
Form faxed to Simon Chappell @ # [phone].     Original sent to be scanned.     Oliverio Marcus

## 2021-11-02 NOTE — TELEPHONE ENCOUNTER
Reason for Call:  Form, our goal is to have forms completed with 72 hours, however, some forms may require a visit or additional information.    Type of letter, form or note:  medical    Who is the form from?: Simon Chappell (if other please explain)    Where did the form come from: Patient or family brought in       What clinic location was the form placed at?: [facility]    Where the form was placed: Given to physician    What number is listed as a contact on the form?: Fax #[phone]       Additional comments:     Call taken on 11/2/2021 at 11:48 AM by Oliverio Marcus

## 2022-05-28 ENCOUNTER — LAB (OUTPATIENT)
Dept: URGENT CARE | Facility: URGENT CARE | Age: 42
End: 2022-05-28
Attending: FAMILY MEDICINE
Payer: COMMERCIAL

## 2022-05-28 DIAGNOSIS — Z20.822 CLOSE EXPOSURE TO 2019 NOVEL CORONAVIRUS: ICD-10-CM

## 2022-05-28 PROCEDURE — U0005 INFEC AGEN DETEC AMPLI PROBE: HCPCS

## 2022-05-28 PROCEDURE — U0003 INFECTIOUS AGENT DETECTION BY NUCLEIC ACID (DNA OR RNA); SEVERE ACUTE RESPIRATORY SYNDROME CORONAVIRUS 2 (SARS-COV-2) (CORONAVIRUS DISEASE [COVID-19]), AMPLIFIED PROBE TECHNIQUE, MAKING USE OF HIGH THROUGHPUT TECHNOLOGIES AS DESCRIBED BY CMS-2020-01-R: HCPCS

## 2022-05-29 LAB — SARS-COV-2 RNA RESP QL NAA+PROBE: NEGATIVE

## 2022-06-21 ASSESSMENT — PATIENT HEALTH QUESTIONNAIRE - PHQ9
SUM OF ALL RESPONSES TO PHQ QUESTIONS 1-9: 4
10. IF YOU CHECKED OFF ANY PROBLEMS, HOW DIFFICULT HAVE THESE PROBLEMS MADE IT FOR YOU TO DO YOUR WORK, TAKE CARE OF THINGS AT HOME, OR GET ALONG WITH OTHER PEOPLE: NOT DIFFICULT AT ALL
SUM OF ALL RESPONSES TO PHQ QUESTIONS 1-9: 4

## 2022-06-22 ENCOUNTER — OFFICE VISIT (OUTPATIENT)
Dept: FAMILY MEDICINE | Facility: CLINIC | Age: 42
End: 2022-06-22
Payer: COMMERCIAL

## 2022-06-22 VITALS
HEART RATE: 100 BPM | OXYGEN SATURATION: 98 % | HEIGHT: 75 IN | WEIGHT: 213 LBS | TEMPERATURE: 97.3 F | SYSTOLIC BLOOD PRESSURE: 122 MMHG | BODY MASS INDEX: 26.49 KG/M2 | DIASTOLIC BLOOD PRESSURE: 64 MMHG

## 2022-06-22 DIAGNOSIS — F32.5 MAJOR DEPRESSION IN COMPLETE REMISSION (H): Primary | ICD-10-CM

## 2022-06-22 DIAGNOSIS — G62.9 POLYNEUROPATHY: ICD-10-CM

## 2022-06-22 LAB
ALBUMIN SERPL-MCNC: 3.8 G/DL (ref 3.4–5)
ALP SERPL-CCNC: 88 U/L (ref 40–150)
ALT SERPL W P-5'-P-CCNC: 26 U/L (ref 0–70)
ANION GAP SERPL CALCULATED.3IONS-SCNC: 7 MMOL/L (ref 3–14)
AST SERPL W P-5'-P-CCNC: 13 U/L (ref 0–45)
BILIRUB SERPL-MCNC: 1 MG/DL (ref 0.2–1.3)
BUN SERPL-MCNC: 23 MG/DL (ref 7–30)
CALCIUM SERPL-MCNC: 9.2 MG/DL (ref 8.5–10.1)
CHLORIDE BLD-SCNC: 105 MMOL/L (ref 94–109)
CO2 SERPL-SCNC: 27 MMOL/L (ref 20–32)
CREAT SERPL-MCNC: 1.08 MG/DL (ref 0.66–1.25)
CRP SERPL-MCNC: <3 MG/L
ERYTHROCYTE [SEDIMENTATION RATE] IN BLOOD BY WESTERGREN METHOD: 1 MM/HR (ref 0–15)
GFR SERPL CREATININE-BSD FRML MDRD: 88 ML/MIN/1.73M2
GLUCOSE BLD-MCNC: 86 MG/DL (ref 70–99)
HBA1C MFR BLD: 4.9 % (ref 0–5.6)
POTASSIUM BLD-SCNC: 3.9 MMOL/L (ref 3.4–5.3)
PROT SERPL-MCNC: 6.9 G/DL (ref 6.8–8.8)
SODIUM SERPL-SCNC: 139 MMOL/L (ref 133–144)
T4 FREE SERPL-MCNC: 0.89 NG/DL (ref 0.76–1.46)
TSH SERPL DL<=0.005 MIU/L-ACNC: 0.29 MU/L (ref 0.4–4)
VIT B12 SERPL-MCNC: 1051 PG/ML (ref 232–1245)

## 2022-06-22 PROCEDURE — 83036 HEMOGLOBIN GLYCOSYLATED A1C: CPT | Performed by: FAMILY MEDICINE

## 2022-06-22 PROCEDURE — 82607 VITAMIN B-12: CPT | Performed by: FAMILY MEDICINE

## 2022-06-22 PROCEDURE — 86140 C-REACTIVE PROTEIN: CPT | Performed by: FAMILY MEDICINE

## 2022-06-22 PROCEDURE — 80053 COMPREHEN METABOLIC PANEL: CPT | Performed by: FAMILY MEDICINE

## 2022-06-22 PROCEDURE — 84443 ASSAY THYROID STIM HORMONE: CPT | Performed by: FAMILY MEDICINE

## 2022-06-22 PROCEDURE — 36415 COLL VENOUS BLD VENIPUNCTURE: CPT | Performed by: FAMILY MEDICINE

## 2022-06-22 PROCEDURE — 84439 ASSAY OF FREE THYROXINE: CPT | Performed by: FAMILY MEDICINE

## 2022-06-22 PROCEDURE — 99213 OFFICE O/P EST LOW 20 MIN: CPT | Performed by: FAMILY MEDICINE

## 2022-06-22 PROCEDURE — 85652 RBC SED RATE AUTOMATED: CPT | Performed by: FAMILY MEDICINE

## 2022-06-22 ASSESSMENT — PATIENT HEALTH QUESTIONNAIRE - PHQ9
10. IF YOU CHECKED OFF ANY PROBLEMS, HOW DIFFICULT HAVE THESE PROBLEMS MADE IT FOR YOU TO DO YOUR WORK, TAKE CARE OF THINGS AT HOME, OR GET ALONG WITH OTHER PEOPLE: NOT DIFFICULT AT ALL
SUM OF ALL RESPONSES TO PHQ QUESTIONS 1-9: 4

## 2022-06-22 NOTE — PROGRESS NOTES
"  Assessment & Plan     Major depression in complete remission (H): stable      Polyneuropathy: no obvious deficits on exam. Will check baseline labs to evaluate for metabolic abnormalities, inflammation, vitamin deficiencies. Will also refer to neurology.   - Comprehensive metabolic panel (BMP + Alb, Alk Phos, ALT, AST, Total. Bili, TP); Future  - Hemoglobin A1c; Future  - TSH with free T4 reflex; Future  - ESR: Erythrocyte sedimentation rate; Future  - CRP, inflammation; Future  - Vitamin B12; Future  - Adult Neurology  Referral; Future  - Comprehensive metabolic panel (BMP + Alb, Alk Phos, ALT, AST, Total. Bili, TP)  - Hemoglobin A1c  - TSH with free T4 reflex  - ESR: Erythrocyte sedimentation rate  - CRP, inflammation  - Vitamin B12  - T4 free             BMI:   Estimated body mass index is 26.62 kg/m  as calculated from the following:    Height as of this encounter: 1.905 m (6' 3\").    Weight as of this encounter: 96.6 kg (213 lb).       Return in about 1 month (around 7/22/2022) for follow up if symptoms not improving.    Bran Bonilla DO  [facility] KATELYN Reyes is a 41 year old, presenting for the following health issues:  Pain      History of Present Illness       Reason for visit:  Intermittent nerve pain in feet and hands  Symptom onset:  More than a month  Symptoms include:  Backs of hands and tops of feet, when touched / stretched / pressured, sharp pain that spikes and dissipates, Started with left foot, progressed to both feet and now left hand.  Symptom intensity:  Moderate  Symptom progression:  Worsening  Had these symptoms before:  Yes  What makes it worse:  My arm being stetched seems to trigger the hand pain - for feet it is a combination of stretching and physical pressure, i.e. putting on shoes or sitting on floor  What makes it better:  Pain usually subsides in 1-3 minutes on its own.    He eats 2-3 servings of fruits and vegetables daily.He " "consumes 0 sweetened beverage(s) daily.He exercises with enough effort to increase his heart rate 60 or more minutes per day.  He exercises with enough effort to increase his heart rate 6 days per week.   He is taking medications regularly.    Today's PHQ-9         PHQ-9 Total Score: 4    PHQ-9 Q9 Thoughts of better off dead/self-harm past 2 weeks :   Not at all    How difficult have these problems made it for you to do your work, take care of things at home, or get along with other people: Not difficult at all     He states that he has been intermittently having a sharp stabbing pain in the top of his foot with plantarflexion.  A few months ago started noticing on the back of his left hand if his arm is extended and reaching backwards.  He states that sometimes tying his shoes will trigger the symptoms on the top of his feet.  He has noticed that his hand feels a bit numb as well.  Denies any change in strength.  He states he has allergies but no other recent illnesses.  He did have a cold a few months ago in March.  Denies any joint pain or swelling.  No skin rashes, dry eyes, or dry mouth. No urinary symptoms. No diarrhea. Sometimes with straining with bowel movement may notice a little blood.           Review of Systems   Constitutional, HEENT, cardiovascular, pulmonary, gi and gu systems are negative, except as otherwise noted.      Objective    /64 (BP Location: Left arm, Cuff Size: Adult Regular)   Pulse 100   Temp 97.3  F (36.3  C) (Tympanic)   Ht 1.905 m (6' 3\")   Wt 96.6 kg (213 lb)   SpO2 98%   BMI 26.62 kg/m    Body mass index is 26.62 kg/m .  Physical Exam   GENERAL: healthy, alert and no distress  MS: no gross musculoskeletal defects noted, no edema  NEURO: Normal strength and tone, sensory exam grossly normal, mentation intact and cranial nerves 2-12 grossly intact  Monofilament exam normal. Distal capillary refill normal. Distal pulses intact. Normal strenght in BUE and BLE.           "         .  ..

## 2022-06-23 DIAGNOSIS — E05.90 SUBCLINICAL HYPERTHYROIDISM: Primary | ICD-10-CM

## 2022-07-01 ENCOUNTER — OFFICE VISIT (OUTPATIENT)
Dept: NEUROLOGY | Facility: CLINIC | Age: 42
End: 2022-07-01
Attending: FAMILY MEDICINE
Payer: COMMERCIAL

## 2022-07-01 VITALS
OXYGEN SATURATION: 98 % | SYSTOLIC BLOOD PRESSURE: 126 MMHG | BODY MASS INDEX: 26.49 KG/M2 | HEIGHT: 75 IN | HEART RATE: 75 BPM | DIASTOLIC BLOOD PRESSURE: 84 MMHG | WEIGHT: 213 LBS

## 2022-07-01 DIAGNOSIS — R20.2 PARESTHESIA OF BOTH FEET: Primary | ICD-10-CM

## 2022-07-01 DIAGNOSIS — R20.2 LEFT HAND PARESTHESIA: ICD-10-CM

## 2022-07-01 DIAGNOSIS — M79.671 PAIN IN BOTH FEET: ICD-10-CM

## 2022-07-01 DIAGNOSIS — M79.672 PAIN IN BOTH FEET: ICD-10-CM

## 2022-07-01 PROCEDURE — 99204 OFFICE O/P NEW MOD 45 MIN: CPT | Performed by: PSYCHIATRY & NEUROLOGY

## 2022-07-01 NOTE — PATIENT INSTRUCTIONS
AFTER VISIT SUMMARY (AVS):    At today's visit we thoroughly discussed various diagnostic possibilities for your symptoms, necessary evaluation, and the plan, which includes:  Orders Placed This Encounter   Procedures    EMG     No new medications.    Next follow-up appointment is on as needed basis (based on results).    Please do not hesitate to call me with any questions or concerns.    Thanks.

## 2022-07-01 NOTE — LETTER
7/1/2022         RE: Eric Mandel  [address] [place] [address]        Dear Colleague,    Thank you for referring your patient, Eric Mandel, to the [facility]. Please see a copy of my visit note below.    INITIAL NEUROLOGY CONSULTATION    DATE OF VISIT: 7/1/2022  CLINIC LOCATION: [facility]  MRN: [id number]  PATIENT NAME: Eric Mandel  YOB: 1980    REASON FOR VISIT:   Chief Complaint   Patient presents with     Consult     Numbness and tingling with sharp zap's of pain, feet bilateral (left is worse) , and left hand. Started in left foot about 1 year ago      HISTORY OF PRESENT ILLNESS:                                                    Mr. Eric Mandel is 41 year old right handed male patient with past medical history of depression, anxiety, and eczema, who was seen today for suspected by primary care provider polyneuropathy.    Per patient's report, he developed intermittent numbness, tingling, and nerve pain on top of both feet and back of the left hand.  It started with just left foot approximately 1 year ago, then spread to the right foot 6 to 9 months ago.  Left hand symptoms started 1 to 2 months ago.  It primarily affects the lateral dorsal surfaces of both feet extending to last 2 toes, left more frequently than the right, and medial back surface of the left hand, spreading to the last 2 fingers.  Usually, occurs when he sits on his feet on the floor or hyperflexes his left wrist.  Pain is sharp, but brief and goes away on its own after repositioning the affected extremity.  Typically tingling and pain last for a few seconds, but numbness could linger for 5 to 10 minutes.  No treatments tried.  He has 1-2 alcoholic drinks up to 5 times per week.  No prior history of chemotherapy.    Laboratory evaluation performed by primary care provider in June 2022 demonstrated low TSH (0.29), with normal free T4 (0.89), unremarkable CMP, CRP,  "ESR, A1C (4.9), and B12 1051.    No prior brain or spine imaging.    No additional useful information is available in Care Everywhere, which was reviewed.  PAST MEDICAL/SURGICAL HISTORY:                                                    I personally reviewed patient's past medical and surgical history with the patient at today's visit.  MEDICATIONS:                                                    I personally reviewed patient's medications and allergies with the patient at today's visit.  ALLERGIES:                                                    No Known Allergies  EXAM:                                                    VITAL SIGNS:   /84 (BP Location: Right arm, Patient Position: Sitting, Cuff Size: Adult Large)   Pulse 75   Ht 1.905 m (6' 3\")   Wt 96.6 kg (213 lb)   SpO2 98%   BMI 26.62 kg/m    Mini-Cog Assessment:  Mini Cog Assessment  Clock Draw Score: 2 Normal  3 Item Recall: 3 objects recalled  Mini Cog Total Score: 5  Administered by: : Rosa DELACRUZ    General: pt is in NAD, cooperative.  Skin: normal turgor, moist mucous membranes, no lesions/rashes noticed.  HEENT: ATNC, EOMI, PERRL, white sclera, normal conjunctiva, no nystagmus or ptosis. No carotid bruits bilaterally.  Respiratory: lung sounds clear to auscultation bilaterally, no crackles, wheezes, rhonchi. Symmetric lung excursion, no accessory respiratory muscle use.  Cardiovascular: normal S1/S2, no murmurs/rubs/gallops.   Abdomen: Not distended.  : deferred.    Neurological:  Mental: alert, follows commands, Mini Cog Total Score: 5/5 with 3/3 on memory recall, no aphasia or dysarthria. Fund of knowledge is appropriate for age.  Cranial Nerves:  CN II: visual acuity - able to accurately count fingers with each eye. Visual fields intact, fundi: discs sharp, no papilledema and normal vessels bilaterally.  CN III, IV, VI: EOM intact, pupils equal and reactive  CN V: facial sensation nl  CN VII: face symmetric, no facial droop  CN VIII: " hearing normal  CN IX: palate elevation symmetric, uvula at midline  CN XI SCM normal, shoulder shrug nl  CN XII: tongue midline  Motor: Strength: 5/5 in all major groups of all extremities. Normal tone. No abnormal movements. No pronator drift b/l.  Reflexes: Triceps, biceps, brachioradialis, and patellar reflexes normal and symmetric, achilles reflexes are trace bilaterally. No clonus noted. Toes are down-going b/l.   Sensory: light touch, pinprick, and vibration intact. Romberg: negative.  Coordination: FNF and heel-shin tests intact b/l.   Gait:  Normal, able to tandem walk without difficulty.  DATA:   LABS/EEG/IMAGING/OTHER STUDIES: I reviewed pertinent medical records, as detailed in the history of present illness.  ASSESSMENT AND PLAN:      ASSESSMENT: Eric Mandel is a 41 year old male patient with listed above past medical history, who presents with limb paresthesias and pain that affected both of his feet and left hand.    We had a detailed discussion with the patient regarding his presenting complaints.  The neurological exam today is non-focal.  I discussed with the patient that most likely his symptoms related to positional pressure on distal branches of his feet nerves (namely intermittent dorsal cutaneous nerves) and also ulnar nerve.  However, reduced bilateral Achilles reflexes also raise the possibility of very mild peripheral polyneuropathy.  I would like to obtain EMG of his both lower extremities and left hand to clarify his diagnosis.  If negative, would recommend avoiding positional pressure in these areas.  Otherwise, we will set him up for follow-up to discuss treatments.    DIAGNOSES:    ICD-10-CM    1. Paresthesia of both feet  R20.2 Adult Neurology  Referral   2. Left hand paresthesia  R20.2    3. Pain in both feet  M79.671     M79.672      PLAN: At today's visit we thoroughly discussed various diagnostic possibilities for patient's symptoms, necessary evaluation, and the plan,  "which includes:  Orders Placed This Encounter   Procedures     EMG     No new medications.    Next follow-up appointment is on as needed basis (based on results).    Total Time: 49 minutes spent on the date of the encounter doing chart review, history and exam, documentation and further activities per the note.    Riley Mayers MD  [facility]  (Chart documentation was completed in part with Dragon voice-recognition software. Even though reviewed, some grammatical, spelling, and word errors may remain.)            Eric Mandel is a 41 year old male who presents for:  Chief Complaint   Patient presents with     Consult     Numbness and tingling with sharp zap's of pain, feet bilateral (left is worse) , and left hand. Started in left foot about 1 year ago         Initial Vitals:  /84 (BP Location: Right arm, Patient Position: Sitting, Cuff Size: Adult Large)   Pulse 75   Ht 1.905 m (6' 3\")   Wt 96.6 kg (213 lb)   SpO2 98%   BMI 26.62 kg/m   Estimated body mass index is 26.62 kg/m  as calculated from the following:    Height as of this encounter: 1.905 m (6' 3\").    Weight as of this encounter: 96.6 kg (213 lb).. Body surface area is 2.26 meters squared. BP completed using cuff size: large        Rosa Navarrete      Again, thank you for allowing me to participate in the care of your patient.        Sincerely,        Riley Mayers MD    "

## 2022-07-01 NOTE — PROGRESS NOTES
INITIAL NEUROLOGY CONSULTATION    DATE OF VISIT: 7/1/2022  CLINIC LOCATION: [facility]  MRN: [id number]  PATIENT NAME: Eric Mandel  YOB: 1980    REASON FOR VISIT:   Chief Complaint   Patient presents with     Consult     Numbness and tingling with sharp zap's of pain, feet bilateral (left is worse) , and left hand. Started in left foot about 1 year ago      HISTORY OF PRESENT ILLNESS:                                                    Mr. Eric Mandel is 41 year old right handed male patient with past medical history of depression, anxiety, and eczema, who was seen today for suspected by primary care provider polyneuropathy.    Per patient's report, he developed intermittent numbness, tingling, and nerve pain on top of both feet and back of the left hand.  It started with just left foot approximately 1 year ago, then spread to the right foot 6 to 9 months ago.  Left hand symptoms started 1 to 2 months ago.  It primarily affects the lateral dorsal surfaces of both feet extending to last 2 toes, left more frequently than the right, and medial back surface of the left hand, spreading to the last 2 fingers.  Usually, occurs when he sits on his feet on the floor or hyperflexes his left wrist.  Pain is sharp, but brief and goes away on its own after repositioning the affected extremity.  Typically tingling and pain last for a few seconds, but numbness could linger for 5 to 10 minutes.  No treatments tried.  He has 1-2 alcoholic drinks up to 5 times per week.  No prior history of chemotherapy.    Laboratory evaluation performed by primary care provider in June 2022 demonstrated low TSH (0.29), with normal free T4 (0.89), unremarkable CMP, CRP, ESR, A1C (4.9), and B12 1051.    No prior brain or spine imaging.    No additional useful information is available in Care Everywhere, which was reviewed.  PAST MEDICAL/SURGICAL HISTORY:                                                    I  "personally reviewed patient's past medical and surgical history with the patient at today's visit.  MEDICATIONS:                                                    I personally reviewed patient's medications and allergies with the patient at today's visit.  ALLERGIES:                                                    No Known Allergies  EXAM:                                                    VITAL SIGNS:   /84 (BP Location: Right arm, Patient Position: Sitting, Cuff Size: Adult Large)   Pulse 75   Ht 1.905 m (6' 3\")   Wt 96.6 kg (213 lb)   SpO2 98%   BMI 26.62 kg/m    Mini-Cog Assessment:  Mini Cog Assessment  Clock Draw Score: 2 Normal  3 Item Recall: 3 objects recalled  Mini Cog Total Score: 5  Administered by: : Rosa DELACRUZ    General: pt is in NAD, cooperative.  Skin: normal turgor, moist mucous membranes, no lesions/rashes noticed.  HEENT: ATNC, EOMI, PERRL, white sclera, normal conjunctiva, no nystagmus or ptosis. No carotid bruits bilaterally.  Respiratory: lung sounds clear to auscultation bilaterally, no crackles, wheezes, rhonchi. Symmetric lung excursion, no accessory respiratory muscle use.  Cardiovascular: normal S1/S2, no murmurs/rubs/gallops.   Abdomen: Not distended.  : deferred.    Neurological:  Mental: alert, follows commands, Mini Cog Total Score: 5/5 with 3/3 on memory recall, no aphasia or dysarthria. Fund of knowledge is appropriate for age.  Cranial Nerves:  CN II: visual acuity - able to accurately count fingers with each eye. Visual fields intact, fundi: discs sharp, no papilledema and normal vessels bilaterally.  CN III, IV, VI: EOM intact, pupils equal and reactive  CN V: facial sensation nl  CN VII: face symmetric, no facial droop  CN VIII: hearing normal  CN IX: palate elevation symmetric, uvula at midline  CN XI SCM normal, shoulder shrug nl  CN XII: tongue midline  Motor: Strength: 5/5 in all major groups of all extremities. Normal tone. No abnormal movements. No pronator " drift b/l.  Reflexes: Triceps, biceps, brachioradialis, and patellar reflexes normal and symmetric, achilles reflexes are trace bilaterally. No clonus noted. Toes are down-going b/l.   Sensory: light touch, pinprick, and vibration intact. Romberg: negative.  Coordination: FNF and heel-shin tests intact b/l.   Gait:  Normal, able to tandem walk without difficulty.  DATA:   LABS/EEG/IMAGING/OTHER STUDIES: I reviewed pertinent medical records, as detailed in the history of present illness.  ASSESSMENT AND PLAN:      ASSESSMENT: Eric Mandel is a 41 year old male patient with listed above past medical history, who presents with limb paresthesias and pain that affected both of his feet and left hand.    We had a detailed discussion with the patient regarding his presenting complaints.  The neurological exam today is non-focal.  I discussed with the patient that most likely his symptoms related to positional pressure on distal branches of his feet nerves (namely intermittent dorsal cutaneous nerves) and also ulnar nerve.  However, reduced bilateral Achilles reflexes also raise the possibility of very mild peripheral polyneuropathy.  I would like to obtain EMG of his both lower extremities and left hand to clarify his diagnosis.  If negative, would recommend avoiding positional pressure in these areas.  Otherwise, we will set him up for follow-up to discuss treatments.    DIAGNOSES:    ICD-10-CM    1. Paresthesia of both feet  R20.2 Adult Neurology  Referral   2. Left hand paresthesia  R20.2    3. Pain in both feet  M79.671     M79.672      PLAN: At today's visit we thoroughly discussed various diagnostic possibilities for patient's symptoms, necessary evaluation, and the plan, which includes:  Orders Placed This Encounter   Procedures     EMG     No new medications.    Next follow-up appointment is on as needed basis (based on results).    Total Time: 49 minutes spent on the date of the encounter doing chart  review, history and exam, documentation and further activities per the note.    Riley Mayers MD  [facility]  (Chart documentation was completed in part with Dragon voice-recognition software. Even though reviewed, some grammatical, spelling, and word errors may remain.)

## 2022-07-01 NOTE — PROGRESS NOTES
"Eric Mandel is a 41 year old male who presents for:  Chief Complaint   Patient presents with     Consult     Numbness and tingling with sharp zap's of pain, feet bilateral (left is worse) , and left hand. Started in left foot about 1 year ago         Initial Vitals:  /84 (BP Location: Right arm, Patient Position: Sitting, Cuff Size: Adult Large)   Pulse 75   Ht 1.905 m (6' 3\")   Wt 96.6 kg (213 lb)   SpO2 98%   BMI 26.62 kg/m   Estimated body mass index is 26.62 kg/m  as calculated from the following:    Height as of this encounter: 1.905 m (6' 3\").    Weight as of this encounter: 96.6 kg (213 lb).. Body surface area is 2.26 meters squared. BP completed using cuff size: maday Navarrete  "

## 2022-08-17 ENCOUNTER — E-VISIT (OUTPATIENT)
Dept: FAMILY MEDICINE | Facility: CLINIC | Age: 42
End: 2022-08-17
Payer: COMMERCIAL

## 2022-08-17 DIAGNOSIS — H00.014 HORDEOLUM OF LEFT UPPER EYELID, UNSPECIFIED HORDEOLUM TYPE: Primary | ICD-10-CM

## 2022-08-17 PROCEDURE — 99422 OL DIG E/M SVC 11-20 MIN: CPT | Performed by: FAMILY MEDICINE

## 2022-08-18 RX ORDER — POLYMYXIN B SULFATE AND TRIMETHOPRIM 1; 10000 MG/ML; [USP'U]/ML
1-2 SOLUTION OPHTHALMIC 4 TIMES DAILY
Qty: 3 ML | Refills: 0 | Status: SHIPPED | OUTPATIENT
Start: 2022-08-18 | End: 2022-08-25

## 2022-08-19 NOTE — TELEPHONE ENCOUNTER
FUTURE VISIT INFORMATION      FUTURE VISIT INFORMATION:    Date: 10/20/2022    Time: 10 AM    Location: [facility]  REFERRAL INFORMATION:    Referring provider: Dr. Bran Bonilla    Referring providers clinic: [facility]    Reason for visit/diagnosis: Hordeolum of Left Upper Eyelid    RECORDS REQUESTED FROM:       Clinic name Comments Records Status Imaging Status   [facility] 8/17/22, 6/22/22 - PCC OV with Dr. Chad Boo  [facility] 7/1/22 - NEURO OV with Dr. Talib Chaney

## 2022-08-22 ENCOUNTER — LAB (OUTPATIENT)
Dept: LAB | Facility: CLINIC | Age: 42
End: 2022-08-22
Payer: COMMERCIAL

## 2022-08-22 ENCOUNTER — TELEPHONE (OUTPATIENT)
Dept: OPHTHALMOLOGY | Facility: CLINIC | Age: 42
End: 2022-08-22

## 2022-08-22 DIAGNOSIS — E05.90 SUBCLINICAL HYPERTHYROIDISM: ICD-10-CM

## 2022-08-22 PROCEDURE — 99000 SPECIMEN HANDLING OFFICE-LAB: CPT

## 2022-08-22 PROCEDURE — 36415 COLL VENOUS BLD VENIPUNCTURE: CPT

## 2022-08-22 PROCEDURE — 84445 ASSAY OF TSI GLOBULIN: CPT | Mod: 90

## 2022-08-22 PROCEDURE — 83520 IMMUNOASSAY QUANT NOS NONAB: CPT | Mod: 90

## 2022-08-22 PROCEDURE — 84443 ASSAY THYROID STIM HORMONE: CPT

## 2022-08-22 NOTE — TELEPHONE ENCOUNTER
Spoke with patient regarding a sooner appointment available with provider. Patient needed to cancel Appointment due to Provider is out of Network. Cancelled appointment as requested.-Per Patient

## 2022-08-23 LAB — TSH SERPL DL<=0.005 MIU/L-ACNC: 0.53 MU/L (ref 0.4–4)

## 2022-08-24 LAB — TSH RECEP AB SER-ACNC: <1.1 IU/L (ref 0–1.75)

## 2022-08-25 LAB — TSI SER-ACNC: <1 TSI INDEX

## 2022-09-06 ENCOUNTER — OFFICE VISIT (OUTPATIENT)
Dept: NEUROLOGY | Facility: CLINIC | Age: 42
End: 2022-09-06
Attending: PSYCHIATRY & NEUROLOGY
Payer: COMMERCIAL

## 2022-09-06 DIAGNOSIS — M79.672 PAIN IN BOTH FEET: ICD-10-CM

## 2022-09-06 DIAGNOSIS — M79.671 PAIN IN BOTH FEET: ICD-10-CM

## 2022-09-06 DIAGNOSIS — G57.32 DEEP PERONEAL NEUROPATHY OF LEFT LOWER EXTREMITY: Primary | ICD-10-CM

## 2022-09-06 DIAGNOSIS — G56.22 ULNAR NEUROPATHY AT ELBOW OF LEFT UPPER EXTREMITY: ICD-10-CM

## 2022-09-06 DIAGNOSIS — R20.2 LEFT HAND PARESTHESIA: ICD-10-CM

## 2022-09-06 DIAGNOSIS — R20.2 PARESTHESIA OF BOTH FEET: ICD-10-CM

## 2022-09-06 PROCEDURE — 95886 MUSC TEST DONE W/N TEST COMP: CPT | Performed by: PSYCHIATRY & NEUROLOGY

## 2022-09-06 PROCEDURE — 95885 MUSC TST DONE W/NERV TST LIM: CPT | Mod: 59 | Performed by: PSYCHIATRY & NEUROLOGY

## 2022-09-06 PROCEDURE — 95911 NRV CNDJ TEST 9-10 STUDIES: CPT | Performed by: PSYCHIATRY & NEUROLOGY

## 2022-09-06 NOTE — PROGRESS NOTES
[facility]  Electrodiagnostic Laboratory                 Department of Neurology                                                                                                         Test Date:  2022    Patient: Eric Mandel : 1980 Physician: Arnoldo Hendricks MD   Sex: Male AGE: 41 year Ref Phys: Riley Mayers MD   ID#: [id number]   Technician: VARINDER Marrero     Clinical Information:    41 year old man with two complaints: a) intermittent burning/zapping sensation lasting at most a few minutes, at the dorsal aspect of both feet, occurring when sitting on the ground or playing with his children, and b) intermittent numbness/tingling at digits 4-5 of the left hand, triggered by elbow flexion and extension movements. Query polyneuropathy and/or left ulnar neuropathy at the elbow.     Techniques:    Motor and sensory conduction studies were done with surface recording electrodes. EMG was done with a concentric needle electrode.     Results:    Left fibular (TA), bilateral fibular (EDB), left tibial (AH), and left median (APB) motor NCSs were normal. Left ulnar (ADM) motor NCS showed normal distal latency and CMAP amplitudes, normal conduction velocities at the forearm and upper arm, and markedly attenuated conduction velocity across the elbow (29 m/s). Left median, ulnar, sural, and bilateral superficial fibular antidromic sensory NCSs were normal. Needle EMG showed 3+ fibs and moderately reduced recruitment of prominently polyphasic MUPs at the left TA. Needle EMG of the following muscles was normal: bilateral medial gastrocnemius, bilateral fibularis longus, right TA, right vastus medialis, and left tibialis posterior, short head of biceps femoris, vastus lateralis, gluteus medius, and FDI.     Interpretation:    Abnormal study. There is electrodiagnostic evidence of 1) A mild left ulnar neuropathy at the elbow, and 2) Isolated denervation changes at  the left tibialis anterior. 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 This conclusion is based on the lack of denervation changes in other muscles innervated by more proximal nerves like the sciatic nerve, lumbosacral plexus, or L5 nerve root. There is no electrodiagnostic evidence of large fiber polyneuropathy or right peroneal mononeuropathy from this study. Clinical correlation is recommended.     ___________________________  Arnoldo Hendricks MD        Nerve Conduction Studies  Motor Sites      Latency Amplitude Neg. Amp Diff Segment Distance Velocity Neg. Dur Neg Area Diff Temperature Comment   Site (ms) Norm (mV) Norm %  cm m/s Norm ms %  C    Left Dp Branch Fibular (TA) Motor   Fib Head 7.3  < 6.0 4.5 -      13.4  23.7    Pop Fossa 5.1  < 5.7 4.6 - 2.2 Pop Fossa-Fib Head 10 45 - 12.7 -6.9 23.8    Left Fibular (EDB) Motor   Ankle 5.8  < 6.0 4.3  > 2.5  Ankle-EDB 8   6.6  31.1    Bel Fib Head 15.1 - 3.1 - -27.9 Bel Fib Head-Ankle 36 39  > 38 8.2 -12.4 31.1    Pop Fossa 17.7 - 2.6 - -16.1 Pop Fossa-Bel Fib Head 11 42  > 38 8.1 -9.0 31    Right Fibular (EDB) Motor   Ankle 5.6  < 6.0 5.1  > 2.5  Ankle-EDB 8   6.6  31.4    Left Median (APB) Motor   Wrist 3.8  < 4.4 15.5  > 5.0  Wrist-APB 8   5.8  32.4    Elbow 8.8 - 14.6 - -5.8 Elbow-Wrist 25 50  > 48 5.5 -10.3 32.4    Left Tibial (AHB) Motor   Ankle 6.2  < 6.5 18.5  > 4.4  Ankle-AHB 8   5.5  31.4    Knee 16.6 - 12.7 - -31.4 Knee-Ankle 43 41  > 38 6.4 -19.3 31.3    Left Ulnar (ADM) Motor   Wrist 3.5  < 3.5 7.9  > 5.0  Wrist-ADM 8   7.6  32.5    Bel Elbow 8.3 - 8.2 - 3.8 Bel Elbow-Wrist 23 48  > 48 7.8 -5.4 32.4    Abv Elbow 11.4 - 8.4 - 2.4 Abv Elbow-Bel Elbow 9 29  > 48 7.8 -2.5 32.4    Erb's Pt. 12.7 - 7.9 - -6.0 Erb's Pt.-Abv Elbow 8 62 - 8.0 -1.95 32.4      Sensory Sites      Onset Lat Peak Lat Amp (O-P) Amp (P-P)  Segment Distance Velocity Temperature Comment   Site ms ms  V Norm  V  cm m/s Norm  C    Left Median Sensory   Wrist-Dig II 2.7 3.5 36  > 10 45 Wrist-Dig II 14 52  > 48 32.8    Left Superficial Fibular Sensory   14 cm-Ankle 3.4 4.2 5  > 3 12 14 cm-Ankle 14 41  > 38 31.4    Right Superficial Fibular Sensory   14 cm-Ankle 3.0 3.9 6  > 3 10 14 cm-Ankle 12.5 42  > 38 31.1    Left Sural Sensory   Calf-Lat Mall 3.1 4.0 10  > 5 18 Calf-Lat Mall 14 45  > 38 23.2    Left Ulnar Sensory   Wrist-Dig V 2.5 3.4 19  > 8 31 Wrist-Dig V 12.5 50  > 48 32.5        Electromyography     Side Muscle Ins Act Fibs/PSW Fasc HF Amp Dur Poly Recrt Int Pat   Left FDI Nml None Nml 0 Nml Nml 0 Nml Nml   Left Tib Anterior Nml 3+ Nml 0 Nml Nml 2+ ModRed Nml   Left Gastroc MH Nml None Nml 0 Nml Nml 0 Nml Nml   Left Vastus Lat Nml None Nml 0 Nml Nml 0 Nml Nml   Left Biceps Fem SH Nml None Nml 0 Nml Nml 0 Nml Nml   Left Fib Longus Nml None Nml 0 Nml Nml 0 Nml Nml   Left Tib Posterior Nml None Nml 0 Nml Nml 0 Nml Nml   Left Gluteus Med Nml None Nml 0 Nml Nml 0 Nml Nml   Right Tib Anterior Incr None Nml 0 Nml Nml 0 Nml Nml   Right Gastroc MH Incr None Nml 0 Nml Nml 0 Nml Nml   Right Vastus Med Nml None Nml 0 Nml Nml 0 Nml Nml   Right Fib Longus Nml None Nml 0 Nml Nml 0 Nml Nml         NCS Waveforms:    Motor                    Sensory                    Ultrasound Images:

## 2022-09-06 NOTE — LETTER
2022       RE: Eric Mandel  [address] [place] [address]     Dear Colleague,    Thank you for referring your patient, Eric Mandel, to the [facility] EMG CLINIC [place] [facility]. Please see a copy of my visit note below.                        [facility]  Electrodiagnostic Laboratory                 Department of Neurology                                                                                                         Test Date:  2022    Patient: Eric Mandel : 1980 Physician: Arnoldo Hendricks MD   Sex: Male AGE: 41 year Ref Phys: Riley Mayers MD   ID#: [id number]   Technician: VARINDER Marrero     Clinical Information:    41 year old man with two complaints: a) intermittent burning/zapping sensation lasting at most a few minutes, at the dorsal aspect of both feet, occurring when sitting on the ground or playing with his children, and b) intermittent numbness/tingling at digits 4-5 of the left hand, triggered by elbow flexion and extension movements. Query polyneuropathy and/or left ulnar neuropathy at the elbow.     Techniques:    Motor and sensory conduction studies were done with surface recording electrodes. EMG was done with a concentric needle electrode.     Results:    Left fibular (TA), bilateral fibular (EDB), left tibial (AH), and left median (APB) motor NCSs were normal. Left ulnar (ADM) motor NCS showed normal distal latency and CMAP amplitudes, normal conduction velocities at the forearm and upper arm, and markedly attenuated conduction velocity across the elbow (29 m/s). Left median, ulnar, sural, and bilateral superficial fibular antidromic sensory NCSs were normal. Needle EMG showed 3+ fibs and moderately reduced recruitment of prominently polyphasic MUPs at the left TA. Needle EMG of the following muscles was normal: bilateral medial gastrocnemius, bilateral fibularis longus,  right TA, right vastus medialis, and left tibialis posterior, short head of biceps femoris, vastus lateralis, gluteus medius, and FDI.     Interpretation:    Abnormal study. There is electrodiagnostic evidence of 1) A mild left ulnar neuropathy at the elbow, and 2) Isolated denervation changes at the left tibialis anterior. 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 This conclusion is based on the lack of denervation changes in other muscles innervated by more proximal nerves like the sciatic nerve, lumbosacral plexus, or L5 nerve root. There is no electrodiagnostic evidence of large fiber polyneuropathy or right peroneal mononeuropathy from this study. Clinical correlation is recommended.     ___________________________  Arnoldo Hendricks MD        Nerve Conduction Studies  Motor Sites      Latency Amplitude Neg. Amp Diff Segment Distance Velocity Neg. Dur Neg Area Diff Temperature Comment   Site (ms) Norm (mV) Norm %  cm m/s Norm ms %  C    Left Dp Branch Fibular (TA) Motor   Fib Head 7.3  < 6.0 4.5 -      13.4  23.7    Pop Fossa 5.1  < 5.7 4.6 - 2.2 Pop Fossa-Fib Head 10 45 - 12.7 -6.9 23.8    Left Fibular (EDB) Motor   Ankle 5.8  < 6.0 4.3  > 2.5  Ankle-EDB 8   6.6  31.1    Bel Fib Head 15.1 - 3.1 - -27.9 Bel Fib Head-Ankle 36 39  > 38 8.2 -12.4 31.1    Pop Fossa 17.7 - 2.6 - -16.1 Pop Fossa-Bel Fib Head 11 42  > 38 8.1 -9.0 31    Right Fibular (EDB) Motor   Ankle 5.6  < 6.0 5.1  > 2.5  Ankle-EDB 8   6.6  31.4    Left Median (APB) Motor   Wrist 3.8  < 4.4 15.5  > 5.0  Wrist-APB 8   5.8  32.4    Elbow 8.8 - 14.6 - -5.8 Elbow-Wrist 25 50  > 48 5.5 -10.3 32.4    Left Tibial (AHB) Motor   Ankle 6.2  < 6.5 18.5  > 4.4  Ankle-AHB 8   5.5  31.4    Knee 16.6 - 12.7 - -31.4 Knee-Ankle 43 41  > 38 6.4 -19.3 31.3    Left Ulnar (ADM) Motor   Wrist 3.5  < 3.5 7.9  > 5.0  Wrist-ADM  8   7.6  32.5    Bel Elbow 8.3 - 8.2 - 3.8 Bel Elbow-Wrist 23 48  > 48 7.8 -5.4 32.4    Abv Elbow 11.4 - 8.4 - 2.4 Abv Elbow-Bel Elbow 9 29  > 48 7.8 -2.5 32.4    Erb's Pt. 12.7 - 7.9 - -6.0 Erb's Pt.-Abv Elbow 8 62 - 8.0 -1.95 32.4      Sensory Sites      Onset Lat Peak Lat Amp (O-P) Amp (P-P) Segment Distance Velocity Temperature Comment   Site ms ms  V Norm  V  cm m/s Norm  C    Left Median Sensory   Wrist-Dig II 2.7 3.5 36  > 10 45 Wrist-Dig II 14 52  > 48 32.8    Left Superficial Fibular Sensory   14 cm-Ankle 3.4 4.2 5  > 3 12 14 cm-Ankle 14 41  > 38 31.4    Right Superficial Fibular Sensory   14 cm-Ankle 3.0 3.9 6  > 3 10 14 cm-Ankle 12.5 42  > 38 31.1    Left Sural Sensory   Calf-Lat Mall 3.1 4.0 10  > 5 18 Calf-Lat Mall 14 45  > 38 23.2    Left Ulnar Sensory   Wrist-Dig V 2.5 3.4 19  > 8 31 Wrist-Dig V 12.5 50  > 48 32.5        Electromyography     Side Muscle Ins Act Fibs/PSW Fasc HF Amp Dur Poly Recrt Int Pat   Left FDI Nml None Nml 0 Nml Nml 0 Nml Nml   Left Tib Anterior Nml 3+ Nml 0 Nml Nml 2+ ModRed Nml   Left Gastroc MH Nml None Nml 0 Nml Nml 0 Nml Nml   Left Vastus Lat Nml None Nml 0 Nml Nml 0 Nml Nml   Left Biceps Fem SH Nml None Nml 0 Nml Nml 0 Nml Nml   Left Fib Longus Nml None Nml 0 Nml Nml 0 Nml Nml   Left Tib Posterior Nml None Nml 0 Nml Nml 0 Nml Nml   Left Gluteus Med Nml None Nml 0 Nml Nml 0 Nml Nml   Right Tib Anterior Incr None Nml 0 Nml Nml 0 Nml Nml   Right Gastroc MH Incr None Nml 0 Nml Nml 0 Nml Nml   Right Vastus Med Nml None Nml 0 Nml Nml 0 Nml Nml   Right Fib Longus Nml None Nml 0 Nml Nml 0 Nml Nml         NCS Waveforms:    Motor                    Sensory                    Ultrasound Images:      Arnoldo Hendricks MD

## 2022-09-07 ENCOUNTER — TELEPHONE (OUTPATIENT)
Dept: NEUROLOGY | Facility: CLINIC | Age: 42
End: 2022-09-07

## 2022-09-07 NOTE — TELEPHONE ENCOUNTER
LVM to call back to schedule follow up appointment.     James Rivera MA on 9/7/2022 at 10:18 AM    ----- Message from Riley Mayers MD sent at 9/6/2022  7:14 PM CDT -----  Please help the patient to set up a follow-up visit to discuss EMG results.  Could be a telephone visit.  Thanks,  Riley Mayers MD.

## 2022-09-10 ENCOUNTER — HEALTH MAINTENANCE LETTER (OUTPATIENT)
Age: 42
End: 2022-09-10

## 2022-09-23 ENCOUNTER — VIRTUAL VISIT (OUTPATIENT)
Dept: NEUROLOGY | Facility: CLINIC | Age: 42
End: 2022-09-23
Payer: COMMERCIAL

## 2022-09-23 DIAGNOSIS — M79.671 PAIN IN BOTH FEET: ICD-10-CM

## 2022-09-23 DIAGNOSIS — G57.32 DEEP PERONEAL NEUROPATHY OF LEFT LOWER EXTREMITY: ICD-10-CM

## 2022-09-23 DIAGNOSIS — R20.2 PARESTHESIA OF BOTH FEET: Primary | ICD-10-CM

## 2022-09-23 DIAGNOSIS — M79.672 PAIN IN BOTH FEET: ICD-10-CM

## 2022-09-23 DIAGNOSIS — G56.22 ULNAR NEUROPATHY AT ELBOW OF LEFT UPPER EXTREMITY: ICD-10-CM

## 2022-09-23 PROCEDURE — 99213 OFFICE O/P EST LOW 20 MIN: CPT | Mod: 95 | Performed by: PSYCHIATRY & NEUROLOGY

## 2022-09-23 NOTE — PATIENT INSTRUCTIONS
AFTER VISIT SUMMARY (AVS):    At today's visit we thoroughly discussed current symptoms, EMG results (mild left ulnar neuropathy and changes suggestive of left deep peroneal neuropathy), diagnosis, available treatment options, and the plan.    I am pleased to hear that your initial symptoms subsided.  We decided to monitor your left calf tingling for a few more weeks before doing any additional testing.  Please reach out to me via My Chart if it continues beyond that time.    Next follow-up appointment is on as needed basis.    Please do not hesitate to call me with any questions or concerns.    Thanks.

## 2022-09-23 NOTE — LETTER
"    9/23/2022         RE: Eric Mandel  [address] [place] [address]        Dear Colleague,    Thank you for referring your patient, Eric Mandel, to the [facility] NEUROLOGY [facility]. Please see a copy of my visit note below.    TELEPHONE ENCOUNTER NOTE    DATE OF VISIT: 9/23/2022  CLINIC LOCATION: [facility]  MRN: [id number]  PATIENT NAME: Eric Mandel  YOB: 1980  PCP: [facility]    Eric Mandel is a 41 year old male who is being evaluated via a billable telephone visit due to COVID-19 precautions.      The patient has been notified of following:     \"This telephone visit will be conducted via a call between you and your physician/provider. We have found that certain health care needs can be provided without the need for a physical exam.  This service lets us provide the care you need with a short phone conversation.  If a prescription is necessary we can send it directly to your pharmacy.  If lab work is needed we can place an order for that and you can then stop by our lab to have the test done at a later time.    If during the course of the call the physician/provider feels a telephone visit is not appropriate, you will not be charged for this service.\"     Eric Mandel complains of    Chief Complaint   Patient presents with     Follow Up     Review EMG      I have reviewed and updated the patient's Past Medical History, Social History, Family History and Medication List.    ALLERGIES  Patient has no known allergies.    The consent for this service was obtained by:  Rosa Navarrete Visit Facilitator     Additional provider notes:    REASON FOR VISIT:   Chief Complaint   Patient presents with     Follow Up     Review EMG      SUBJECTIVE:                                                      HISTORY OF PRESENT ILLNESS: This telephone encounter is regarding bilateral feet paresthesias and intermittent paresthesias over the ulnar side " of the left hand. Please refer to my initial note for further information.    Since the last visit, the patient reports that his initial symptoms on both feet and left hand subsided, but he developed paresthesias over the lateral surface of the left calf region.  He denies interval development of other new focal neurological symptoms.      His EMG from 9/6/2022 demonstrated electrodiagnostic evidence of mild left ulnar neuropathy at the elbow and isolated denervation changes in the left tibialis anterior muscle.  It felt that this latter finding alone is not sufficient to make a definitive diagnosis, but the likely explanation would be mild non-localizing deep peroneal mononeuropathy, proximal to the TA muscle branch.  Tabulated data from EMG report were personally reviewed and independently interpreted.  ASSESSMENT AND PLAN:                                                    Assessment: 41 year old male patient, who due to COVID-19 precautions is evaluated via a telephone visit for bilateral feet paresthesias and intermittent paresthesias over the ulnar side of the left hand after the completion of EMG, that demonstrated mild left ulnar neuropathy at the elbow along with denervation changes in the left tibialis anterior muscle.  It felt that this presentation might be due to left deep peroneal nerve injury proximal to TA muscle branch.  I reviewed these findings with the patient.      His clinical picture of hand symptoms is consistent with left ulnar neuropathy.  At the same time, denervation changes in the left tibialis anterior muscle would not be totally explain his feet symptoms.  As we previously discussed, most likely his feet symptoms are due to positional pressure on intermittent dorsal cutaneous nerves with possible additional deep peroneal nerve involvement.     Currently, he reports that all of his initial symptoms resolved since his first visit.  We discussed that for prevention of feet symptoms'  recurrence he needs to avoid positional pressure or prolonged sitting on his legs.  We also discussed treatment of ulnar neuropathy that includes the use of elbow pad during the day, avoiding prolonged bending in the elbow, and using elbow brace at night.  He could consider these interventions to prevent symptoms' recurrence.    He also reports numbness and paresthesia on the lateral surface of the left calf, which developed since the procedure.  We discussed that it might be related to procedure itself and should subside in few weeks, but he was advised to reach out if it persists for a longer period of time.  We might consider doing lumbar spine MRI.    Diagnoses:    ICD-10-CM    1. Paresthesia of both feet  R20.2    2. Pain in both feet  M79.671     M79.672    3. 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 Ulnar neuropathy at elbow of left upper extremity  G56.22      Plan: At today's visit we thoroughly discussed current symptoms, EMG results (mild left ulnar neuropathy and changes suggestive of left deep peroneal neuropathy), diagnosis, available treatment options, and the plan.    I am pleased to hear that his initial symptoms subsided.  We decided to monitor his left calf tingling for a few more weeks before doing any additional testing.  He was advised to reach out to me via My Chart if it continues beyond that time.    Next follow-up appointment is on as needed basis.    I have reviewed the note as documented above.  This accurately captures the substance of my conversation with the patient.    Phone call contact time: 8 minutes.  Total time 16 minutes.    Riley Mayers MD  [facility] Neurology.  (Chart documentation was completed in part with Dragon voice-recognition software. Even though reviewed, some grammatical, spelling, and word errors may remain.)          Again, thank you for allowing me to participate in the care of your patient.         Sincerely,        Riley Mayers MD

## 2022-09-23 NOTE — PROGRESS NOTES
"TELEPHONE ENCOUNTER NOTE    DATE OF VISIT: 9/23/2022  CLINIC LOCATION: [facility]  MRN: [id number]  PATIENT NAME: Eric Mandel  YOB: 1980  PCP: [facility]    Eric Mandel is a 41 year old male who is being evaluated via a billable telephone visit due to COVID-19 precautions.      The patient has been notified of following:     \"This telephone visit will be conducted via a call between you and your physician/provider. We have found that certain health care needs can be provided without the need for a physical exam.  This service lets us provide the care you need with a short phone conversation.  If a prescription is necessary we can send it directly to your pharmacy.  If lab work is needed we can place an order for that and you can then stop by our lab to have the test done at a later time.    If during the course of the call the physician/provider feels a telephone visit is not appropriate, you will not be charged for this service.\"     Eric Mandel complains of    Chief Complaint   Patient presents with     Follow Up     Review EMG      I have reviewed and updated the patient's Past Medical History, Social History, Family History and Medication List.    ALLERGIES  Patient has no known allergies.    The consent for this service was obtained by:  Rosa Navarrete Visit Facilitator     Additional provider notes:    REASON FOR VISIT:   Chief Complaint   Patient presents with     Follow Up     Review EMG      SUBJECTIVE:                                                      HISTORY OF PRESENT ILLNESS: This telephone encounter is regarding bilateral feet paresthesias and intermittent paresthesias over the ulnar side of the left hand. Please refer to my initial note for further information.    Since the last visit, the patient reports that his initial symptoms on both feet and left hand subsided, but he developed paresthesias over the lateral surface of the left " calf region.  He denies interval development of other new focal neurological symptoms.      His EMG from 9/6/2022 demonstrated electrodiagnostic evidence of mild left ulnar neuropathy at the elbow and isolated denervation changes in the left tibialis anterior muscle.  It felt that this latter finding alone is not sufficient to make a definitive diagnosis, but the likely explanation would be mild non-localizing deep peroneal mononeuropathy, proximal to the TA muscle branch.  Tabulated data from EMG report were personally reviewed and independently interpreted.  ASSESSMENT AND PLAN:                                                    Assessment: 41 year old male patient, who due to COVID-19 precautions is evaluated via a telephone visit for bilateral feet paresthesias and intermittent paresthesias over the ulnar side of the left hand after the completion of EMG, that demonstrated mild left ulnar neuropathy at the elbow along with denervation changes in the left tibialis anterior muscle.  It felt that this presentation might be due to left deep peroneal nerve injury proximal to TA muscle branch.  I reviewed these findings with the patient.      His clinical picture of hand symptoms is consistent with left ulnar neuropathy.  At the same time, denervation changes in the left tibialis anterior muscle would not be totally explain his feet symptoms.  As we previously discussed, most likely his feet symptoms are due to positional pressure on intermittent dorsal cutaneous nerves with possible additional deep peroneal nerve involvement.     Currently, he reports that all of his initial symptoms resolved since his first visit.  We discussed that for prevention of feet symptoms' recurrence he needs to avoid positional pressure or prolonged sitting on his legs.  We also discussed treatment of ulnar neuropathy that includes the use of elbow pad during the day, avoiding prolonged bending in the elbow, and using elbow brace at night.   He could consider these interventions to prevent symptoms' recurrence.    He also reports numbness and paresthesia on the lateral surface of the left calf, which developed since the procedure.  We discussed that it might be related to procedure itself and should subside in few weeks, but he was advised to reach out if it persists for a longer period of time.  We might consider doing lumbar spine MRI.    Diagnoses:    ICD-10-CM    1. Paresthesia of both feet  R20.2    2. Pain in both feet  M79.671     M79.672    3. 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 Ulnar neuropathy at elbow of left upper extremity  G56.22      Plan: At today's visit we thoroughly discussed current symptoms, EMG results (mild left ulnar neuropathy and changes suggestive of left deep peroneal neuropathy), diagnosis, available treatment options, and the plan.    I am pleased to hear that his initial symptoms subsided.  We decided to monitor his left calf tingling for a few more weeks before doing any additional testing.  He was advised to reach out to me via My Chart if it continues beyond that time.    Next follow-up appointment is on as needed basis.    I have reviewed the note as documented above.  This accurately captures the substance of my conversation with the patient.    Phone call contact time: 8 minutes.  Total time 16 minutes.    Riley Mayers MD  [facility] Neurology.  (Chart documentation was completed in part with Dragon voice-recognition software. Even though reviewed, some grammatical, spelling, and word errors may remain.)

## 2022-09-23 NOTE — PROGRESS NOTES
"TELEPHONE ENCOUNTER NOTE    DATE OF VISIT: 9/23/2022  CLINIC LOCATION: [facility] [place]  MRN: [id number]  PATIENT NAME: Eric Mandel  YOB: 1980  PCP: [facility]    Eric Mandel is a 41 year old male who is being evaluated via a billable telephone visit due to COVID-19 precautions.      The patient has been notified of following:     \"This telephone visit will be conducted via a call between you and your physician/provider. We have found that certain health care needs can be provided without the need for a physical exam.  This service lets us provide the care you need with a short phone conversation.  If a prescription is necessary we can send it directly to your pharmacy.  If lab work is needed we can place an order for that and you can then stop by our lab to have the test done at a later time.    If during the course of the call the physician/provider feels a telephone visit is not appropriate, you will not be charged for this service.\"     Eric Mandel complains of  No chief complaint on file.    I have reviewed and updated the patient's Past Medical History, Social History, Family History and Medication List.    ALLERGIES  Patient has no known allergies.    The consent for this service was obtained by:  *** (MA signature)    Additional provider notes:    REASON FOR VISIT: No chief complaint on file.    SUBJECTIVE:                                                      HISTORY OF PRESENT ILLNESS: This telephone encounter is regarding bilateral feet paresthesias and intermittent paresthesias over the ulnar side of the left hand. Please refer to my initial note for further information.    Since the last visit, the patient reports ***.  He denies interval development of new focal neurological symptoms.      His EMG from 9/6/2022 demonstrated electrodiagnostic evidence of mild left ulnar neuropathy at the elbow and isolated denervation changes at the left tibialis " anterior muscle, it felt that this finding alone is not sufficient to make a definitive diagnosis, the likely explanation would be mild nonlocalizing deep peroneal mononeuropathy, proximal to the TA muscle branch.  Tabulated data from EMG report were personally reviewed and independently interpreted.  ASSESSMENT AND PLAN:                                                    Assessment: 41 year old male patient, who due to COVID-19 precautions is evaluated via a telephone visit for bilateral feet paresthesias and intermittent paresthesias over the ulnar side of the left hand after the completion of EMG, that demonstrated mild left ulnar neuropathy at the elbow along with denervation changes in the left tibialis anterior muscle.  It felt that this presentation might be due to left deep peroneal nerve injury proximal to a muscle branch.  I reviewed these findings with the patient.  His clinical picture is consistent with left ulnar neuropathy.  At the same time, denervation changes in the left tibialis anterior muscle would not be totally explain his feet symptoms.  As we previously discussed, most likely his feet symptoms are due to positional pressure on intermittent dorsal cutaneous nerves with possible additional deep peroneal nerve involvement.  We discussed that he needs to avoid positional pressure or prolonged sitting on his leg to prevent worsening.  We also discussed treatment of ulnar neuropathy that includes the use of elbow pad during the day, avoiding prolonged bending in the elbow, and using elbow brace at night.    Diagnoses:    ICD-10-CM    1. Paresthesia of both feet  R20.2    2. Pain in both feet  M79.671     M79.672    3. 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 Ulnar neuropathy at elbow of left upper extremity  G56.22      Plan:  There are no Patient Instructions on file for this visit.    I have reviewed the note as documented above.  This accurately captures the substance of  my conversation with the patient.    Phone call contact time: *** minutes.  Total time *** minutes.    Riley Mayers MD  [facility].  (Chart documentation was completed in part with Dragon voice-recognition software. Even though reviewed, some grammatical, spelling, and word errors may remain.)

## 2022-10-20 ENCOUNTER — PRE VISIT (OUTPATIENT)
Dept: OPHTHALMOLOGY | Facility: CLINIC | Age: 42
End: 2022-10-20

## 2022-11-08 ENCOUNTER — TELEPHONE (OUTPATIENT)
Dept: FAMILY MEDICINE | Facility: CLINIC | Age: 42
End: 2022-11-08

## 2022-11-08 NOTE — TELEPHONE ENCOUNTER
Reason for Call:  Appointment Request    Patient requesting this type of appt:  PT needs to have physical before 11.30.22 due to his insurance. Clinic cancelled his preexisting appt scheduled 11.9.22. Pt not happy    Requested provider: open to anyone in [place]    Reason patient unable to be scheduled: Not with their preferred provider    When does patient want to be seen/preferred time: 3-7 days    Comments: see above comments    Could we send this information to you in Hello Chair or would you prefer to receive a phone call?:   Patient would like to be contacted via Hello Chair    Call taken on 11/8/2022 at 2:23 PM by Ute Moser

## 2022-11-09 NOTE — TELEPHONE ENCOUNTER
Left non-detailed message for patient to call back.  Please schedule physical  when patient calls back.  (see previous notes for details)    Thanks Zaynab

## 2022-11-09 NOTE — TELEPHONE ENCOUNTER
Okay to use provider approval or virtual slot to get Eric scheduled for preventive visit.    Bran Bonilla DO  11/9/2022 12:25 AM

## 2022-11-14 ASSESSMENT — ENCOUNTER SYMPTOMS
PALPITATIONS: 0
JOINT SWELLING: 0
HEMATOCHEZIA: 0
NERVOUS/ANXIOUS: 1
PARESTHESIAS: 0
CONSTIPATION: 0
DYSURIA: 0
HEARTBURN: 0
HEMATURIA: 0
CHILLS: 0
ABDOMINAL PAIN: 0
DIARRHEA: 0
MYALGIAS: 0
COUGH: 0
HEADACHES: 0
NAUSEA: 0
SORE THROAT: 0
FEVER: 0
SHORTNESS OF BREATH: 0
DIZZINESS: 0
EYE PAIN: 1
ARTHRALGIAS: 0
FREQUENCY: 0
WEAKNESS: 0

## 2022-11-16 ENCOUNTER — OFFICE VISIT (OUTPATIENT)
Dept: FAMILY MEDICINE | Facility: CLINIC | Age: 42
End: 2022-11-16
Payer: COMMERCIAL

## 2022-11-16 VITALS
HEIGHT: 75 IN | WEIGHT: 209.8 LBS | OXYGEN SATURATION: 97 % | HEART RATE: 94 BPM | BODY MASS INDEX: 26.08 KG/M2 | TEMPERATURE: 98.1 F | SYSTOLIC BLOOD PRESSURE: 128 MMHG | RESPIRATION RATE: 16 BRPM | DIASTOLIC BLOOD PRESSURE: 82 MMHG

## 2022-11-16 DIAGNOSIS — Z00.00 ROUTINE GENERAL MEDICAL EXAMINATION AT A HEALTH CARE FACILITY: Primary | ICD-10-CM

## 2022-11-16 DIAGNOSIS — Z12.5 SCREENING FOR PROSTATE CANCER: ICD-10-CM

## 2022-11-16 DIAGNOSIS — Z13.1 SCREENING FOR DIABETES MELLITUS: ICD-10-CM

## 2022-11-16 DIAGNOSIS — Z13.220 SCREENING FOR HYPERLIPIDEMIA: ICD-10-CM

## 2022-11-16 LAB
ANION GAP SERPL CALCULATED.3IONS-SCNC: 12 MMOL/L (ref 7–15)
BUN SERPL-MCNC: 20.3 MG/DL (ref 6–20)
CALCIUM SERPL-MCNC: 9.4 MG/DL (ref 8.6–10)
CHLORIDE SERPL-SCNC: 102 MMOL/L (ref 98–107)
CHOLEST SERPL-MCNC: 135 MG/DL
CREAT SERPL-MCNC: 1.17 MG/DL (ref 0.67–1.17)
DEPRECATED HCO3 PLAS-SCNC: 24 MMOL/L (ref 22–29)
GFR SERPL CREATININE-BSD FRML MDRD: 79 ML/MIN/1.73M2
GLUCOSE SERPL-MCNC: 89 MG/DL (ref 70–99)
HDLC SERPL-MCNC: 44 MG/DL
LDLC SERPL CALC-MCNC: 77 MG/DL
NONHDLC SERPL-MCNC: 91 MG/DL
POTASSIUM SERPL-SCNC: 4.1 MMOL/L (ref 3.4–5.3)
PSA SERPL-MCNC: 1.18 NG/ML (ref 0–2.5)
SODIUM SERPL-SCNC: 138 MMOL/L (ref 136–145)
TRIGL SERPL-MCNC: 69 MG/DL

## 2022-11-16 PROCEDURE — 99396 PREV VISIT EST AGE 40-64: CPT | Performed by: FAMILY MEDICINE

## 2022-11-16 PROCEDURE — G0103 PSA SCREENING: HCPCS | Performed by: FAMILY MEDICINE

## 2022-11-16 PROCEDURE — 80048 BASIC METABOLIC PNL TOTAL CA: CPT | Performed by: FAMILY MEDICINE

## 2022-11-16 PROCEDURE — 80061 LIPID PANEL: CPT | Performed by: FAMILY MEDICINE

## 2022-11-16 PROCEDURE — 36415 COLL VENOUS BLD VENIPUNCTURE: CPT | Performed by: FAMILY MEDICINE

## 2022-11-16 RX ORDER — ERYTHROMYCIN 5 MG/G
OINTMENT OPHTHALMIC
COMMUNITY
Start: 2022-08-23 | End: 2023-09-06

## 2022-11-16 ASSESSMENT — ENCOUNTER SYMPTOMS
CHILLS: 0
PARESTHESIAS: 0
MYALGIAS: 0
ARTHRALGIAS: 0
JOINT SWELLING: 0
CONSTIPATION: 0
DYSURIA: 0
SHORTNESS OF BREATH: 0
HEMATURIA: 0
FREQUENCY: 0
NAUSEA: 0
ABDOMINAL PAIN: 0
SORE THROAT: 0
HEADACHES: 0
DIARRHEA: 0
DIZZINESS: 0
PALPITATIONS: 0
FEVER: 0
HEMATOCHEZIA: 0
EYE PAIN: 1
NERVOUS/ANXIOUS: 1
WEAKNESS: 0
HEARTBURN: 0
COUGH: 0

## 2022-11-16 ASSESSMENT — ANXIETY QUESTIONNAIRES
5. BEING SO RESTLESS THAT IT IS HARD TO SIT STILL: NOT AT ALL
GAD7 TOTAL SCORE: 2
2. NOT BEING ABLE TO STOP OR CONTROL WORRYING: NOT AT ALL
3. WORRYING TOO MUCH ABOUT DIFFERENT THINGS: NOT AT ALL
7. FEELING AFRAID AS IF SOMETHING AWFUL MIGHT HAPPEN: NOT AT ALL
1. FEELING NERVOUS, ANXIOUS, OR ON EDGE: SEVERAL DAYS
6. BECOMING EASILY ANNOYED OR IRRITABLE: SEVERAL DAYS
IF YOU CHECKED OFF ANY PROBLEMS ON THIS QUESTIONNAIRE, HOW DIFFICULT HAVE THESE PROBLEMS MADE IT FOR YOU TO DO YOUR WORK, TAKE CARE OF THINGS AT HOME, OR GET ALONG WITH OTHER PEOPLE: NOT DIFFICULT AT ALL
GAD7 TOTAL SCORE: 2

## 2022-11-16 ASSESSMENT — PATIENT HEALTH QUESTIONNAIRE - PHQ9
5. POOR APPETITE OR OVEREATING: NOT AT ALL
SUM OF ALL RESPONSES TO PHQ QUESTIONS 1-9: 2

## 2022-11-16 NOTE — PROGRESS NOTES
SUBJECTIVE:   CC: Eric is an 42 year old who presents for preventative health visit.       Patient has been advised of split billing requirements and indicates understanding: Yes       Healthy Habits:     Getting at least 3 servings of Calcium per day:  Yes    Bi-annual eye exam:  Yes    Dental care twice a year:  Yes    Sleep apnea or symptoms of sleep apnea:  None    Diet:  Regular (no restrictions)    Frequency of exercise:  6-7 days/week    Duration of exercise:  Greater than 60 minutes    Taking medications regularly:  Yes    Medication side effects:  Not applicable and None    PHQ-2 Total Score: 2    Additional concerns today:  Yes      Today's PHQ-2 Score:   PHQ-2 (  [employer]) 2022   Q1: Little interest or pleasure in doing things 1   Q2: Feeling down, depressed or hopeless 1   PHQ-2 Score 2   PHQ-2 Total Score (12-17 Years)- Positive if 3 or more points; Administer PHQ-A if positive -   Q1: Little interest or pleasure in doing things -   Q2: Feeling down, depressed or hopeless -   PHQ-2 Score -       Have you ever done Advance Care Planning? (For example, a Health Directive, POLST, or a discussion with a medical provider or your loved ones about your wishes): No, advance care planning information given to patient to review.  Patient declined advance care planning discussion at this time.    Social History     Tobacco Use     Smoking status: Former     Types: Cigarettes     Quit date: 2000     Years since quittin.5     Smokeless tobacco: Never     Tobacco comments:     smoked for a couple years in college -- doesn't think he smoked 100 cigarette   Substance Use Topics     Alcohol use: Yes     Comment: Occasional     If you drink alcohol do you typically have >3 drinks per day or >7 drinks per week? Yes        AUDIT - Alcohol Use Disorders Identification Test - Reproduced from the World Health Organization Audit 2001 (Second Edition) 2022   1.  How often do you have a drink containing  alcohol? 4 or more times a week   2.  How many drinks containing alcohol do you have on a typical day when you are drinking? 1 or 2   3.  How often do you have five or more drinks on one occasion? Never   4.  How often during the last year have you found that you were not able to stop drinking once you had started? -   5.  How often during the last year have you failed to do what was normally expected of you because of drinking? -   6.  How often during the last year have you needed a first drink in the morning to get yourself going after a heavy drinking session? -   7.  How often during the last year have you had a feeling of guilt or remorse after drinking? -   8.  How often during the last year have you been unable to remember what happened the night before because of your drinking? -   9.  Have you or someone else been injured because of your drinking? No   10. Has a relative, friend, doctor or other health care worker been concerned about your drinking or suggested you cut down? No   TOTAL SCORE -       Last PSA:   Prostate Specific Antigen Screen   Date Value Ref Range Status   10/28/2021 1.19 0.00 - 4.00 ug/L Final       Reviewed orders with patient. Reviewed health maintenance and updated orders accordingly - Yes    Lab work is in process    Reviewed and updated as needed this visit by clinical staff   Tobacco  Allergies  Meds  Problems  Med Hx  Surg Hx  Fam Hx          Reviewed and updated as needed this visit by Provider                 Review of Systems   Constitutional: Negative for chills and fever.   HENT: Negative for congestion, ear pain, hearing loss and sore throat.    Eyes: Positive for pain and visual disturbance.   Respiratory: Negative for cough and shortness of breath.    Cardiovascular: Negative for chest pain, palpitations and peripheral edema.   Gastrointestinal: Negative for abdominal pain, constipation, diarrhea, heartburn, hematochezia and nausea.   Genitourinary: Negative for  "dysuria, frequency, genital sores, hematuria, impotence, penile discharge and urgency.   Musculoskeletal: Negative for arthralgias, joint swelling and myalgias.   Skin: Negative for rash.   Neurological: Negative for dizziness, weakness, headaches and paresthesias.   Psychiatric/Behavioral: Negative for mood changes. The patient is nervous/anxious.        OBJECTIVE:   /82   Pulse 94   Temp 98.1  F (36.7  C) (Tympanic)   Resp 16   Ht 1.905 m (6' 3\")   Wt 95.2 kg (209 lb 12.8 oz)   SpO2 97%   BMI 26.22 kg/m      Physical Exam     GENERAL: healthy, alert and no distress  EYES: Eyes grossly normal to inspection, erythematous swelling of left upper eyelid c/w stye  NECK: no adenopathy, no asymmetry, masses, or scars and thyroid normal to palpation  RESP: lungs clear to auscultation - no rales, rhonchi or wheezes  CV: regular rate and rhythm, normal S1 S2, no S3 or S4, no murmur, click or rub, no peripheral edema and peripheral pulses strong  ABDOMEN: soft, nontender, no hepatosplenomegaly, no masses and bowel sounds normal  MS: no gross musculoskeletal defects noted, no edema  SKIN: no suspicious lesions or rashes  PSYCH: mentation appears normal, affect normal/bright    Diagnostic Test Results:  Labs reviewed in Epic    ASSESSMENT/PLAN:       ICD-10-CM    1. Routine general medical examination at a health care facility  Z00.00       2. Screening for hyperlipidemia  Z13.220 Lipid panel reflex to direct LDL Non-fasting     Lipid panel reflex to direct LDL Non-fasting      3. Screening for diabetes mellitus  Z13.1 Basic metabolic panel  (Ca, Cl, CO2, Creat, Gluc, K, Na, BUN)     Basic metabolic panel  (Ca, Cl, CO2, Creat, Gluc, K, Na, BUN)      4. Screening for prostate cancer  Z12.5 PSA, screen     PSA, screen          Patient has been advised of split billing requirements and indicates understanding: Yes      COUNSELING:   Reviewed preventive health counseling, as reflected in patient instructions      BMI: " "  Estimated body mass index is 26.22 kg/m  as calculated from the following:    Height as of this encounter: 1.905 m (6' 3\").    Weight as of this encounter: 95.2 kg (209 lb 12.8 oz).         He reports that he quit smoking about 22 years ago. His smoking use included cigarettes. He has never used smokeless tobacco.      Bran Bonilla, [facility] [place]  "

## 2022-11-17 ENCOUNTER — TELEPHONE (OUTPATIENT)
Dept: FAMILY MEDICINE | Facility: CLINIC | Age: 42
End: 2022-11-17

## 2023-03-22 PROBLEM — F32.5 MAJOR DEPRESSION IN COMPLETE REMISSION (H): Status: RESOLVED | Noted: 2022-06-22 | Resolved: 2023-03-22

## 2023-03-23 ENCOUNTER — VIRTUAL VISIT (OUTPATIENT)
Dept: FAMILY MEDICINE | Facility: CLINIC | Age: 43
End: 2023-03-23
Payer: COMMERCIAL

## 2023-03-23 DIAGNOSIS — H01.002 BLEPHARITIS OF RIGHT LOWER EYELID, UNSPECIFIED TYPE: Primary | ICD-10-CM

## 2023-03-23 PROCEDURE — 99213 OFFICE O/P EST LOW 20 MIN: CPT | Mod: VID | Performed by: FAMILY MEDICINE

## 2023-03-23 RX ORDER — CIPROFLOXACIN HYDROCHLORIDE 3.5 MG/ML
SOLUTION/ DROPS TOPICAL
Qty: 2.5 ML | Refills: 0 | Status: SHIPPED | OUTPATIENT
Start: 2023-03-23 | End: 2023-04-19

## 2023-03-23 NOTE — PROGRESS NOTES
Eric is a 42 year old who is being evaluated via a billable video visit.      How would you like to obtain your AVS? MyChart  If the video visit is dropped, the invitation should be resent by: Text to cell phone: [phone]  Will anyone else be joining your video visit? No      Assessment & Plan     (H01.002) Blepharitis of right lower eyelid, unspecified type  (primary encounter diagnosis)  Comment: The patient has a history of eyelid issues and has received antibiotics previously.  Plan: ciprofloxacin (CILOXAN) 0.3 % ophthalmic         solution        Return in about 1 week (around 3/30/2023) for recheck with your eye doctor if symptoms fail to resolve by then.  Handout(s) provided         17 minutes spent on the date of the encounter doing chart review, patient visit and documentation          David Ramírez MD  [facility]    Kendra Reyes is a 42 year old, presenting for the following health issues:  Conjunctivitis  No flowsheet data found.  HPI     Concern - Pink eye (Right)  Onset: 3/22/23  Description: Swollen, Red, Puffy, Little Painful when closing the eye  Intensity: mild  Progression of Symptoms:  worsening  Accompanying Signs & Symptoms:   Previous history of similar problem:   Precipitating factors:        Worsened by:  none  Alleviating factors:        Improved by: cold pack   Therapies tried and outcome: cold pack       The patient reports that most of the swelling and pain is occurring with his bottom eyelid. He also adds that he has a history of upper eyelid styes, but notes no active infection currently (that he knows of). He denies any corneal pain.    Review of Systems         Objective    Vitals - Patient Reported  Pain Score: No Pain (1)      Vitals:  No vitals were obtained today due to virtual visit.    Physical Exam   GENERAL: Healthy, alert and no distress  EYES: PERRL, EOMI, sclera white, no conjunctival injection noted, but I do see that the right  lower eyelid is mildly erythematous and puffy compared to the left. Video resolution is not good enough to confirm that there is no mattering present, corneal opacity, etc.  RESP: No audible wheeze, cough, or visible cyanosis.  No visible retractions or increased work of breathing.    SKIN: Visible skin clear. No significant rash, abnormal pigmentation or lesions.  NEURO: Cranial nerves grossly intact.  Mentation and speech appropriate for age.  PSYCH: Mentation appears normal, affect normal/bright, judgement and insight intact, normal speech and appearance well-groomed.                Video-Visit Details    Type of service:  Video Visit     Total Video Time: 6 minutes    Originating Location (pt. Location): Home    Distant Location (provider location):  On-site     Platform used for Video Visit: Well    This document serves as a record of the services and decisions personally performed and made by Dr. Ramírez. It was created on his behalf by Cassius Velasquez, a trained medical scribe. The creation of this document is based the provider's statements to the medical scribe.  Cassius Velasquez,  7:21 AM

## 2023-04-19 DIAGNOSIS — H01.002 BLEPHARITIS OF RIGHT LOWER EYELID, UNSPECIFIED TYPE: ICD-10-CM

## 2023-04-19 RX ORDER — CIPROFLOXACIN HYDROCHLORIDE 3.5 MG/ML
SOLUTION/ DROPS TOPICAL
Qty: 2.5 ML | Refills: 0 | Status: SHIPPED | OUTPATIENT
Start: 2023-04-19 | End: 2023-09-06

## 2023-09-06 ENCOUNTER — TELEPHONE (OUTPATIENT)
Dept: FAMILY MEDICINE | Facility: CLINIC | Age: 43
End: 2023-09-06

## 2023-09-06 ENCOUNTER — OFFICE VISIT (OUTPATIENT)
Dept: FAMILY MEDICINE | Facility: CLINIC | Age: 43
End: 2023-09-06
Payer: COMMERCIAL

## 2023-09-06 VITALS
HEIGHT: 75 IN | DIASTOLIC BLOOD PRESSURE: 72 MMHG | HEART RATE: 85 BPM | OXYGEN SATURATION: 100 % | SYSTOLIC BLOOD PRESSURE: 120 MMHG | TEMPERATURE: 97.4 F | RESPIRATION RATE: 13 BRPM | BODY MASS INDEX: 26.61 KG/M2 | WEIGHT: 214 LBS

## 2023-09-06 DIAGNOSIS — H90.6 MIXED HEARING LOSS, BILATERAL: ICD-10-CM

## 2023-09-06 DIAGNOSIS — Z23 NEED FOR INFLUENZA VACCINATION: ICD-10-CM

## 2023-09-06 DIAGNOSIS — Z13.220 SCREENING FOR HYPERLIPIDEMIA: ICD-10-CM

## 2023-09-06 DIAGNOSIS — Z13.1 SCREENING FOR DIABETES MELLITUS: ICD-10-CM

## 2023-09-06 DIAGNOSIS — Z13.0 SCREENING FOR DEFICIENCY ANEMIA: ICD-10-CM

## 2023-09-06 DIAGNOSIS — Z13.29 SCREENING FOR THYROID DISORDER: ICD-10-CM

## 2023-09-06 DIAGNOSIS — Z00.00 ROUTINE GENERAL MEDICAL EXAMINATION AT A HEALTH CARE FACILITY: Primary | ICD-10-CM

## 2023-09-06 PROCEDURE — 99396 PREV VISIT EST AGE 40-64: CPT | Mod: 25 | Performed by: NURSE PRACTITIONER

## 2023-09-06 PROCEDURE — 90471 IMMUNIZATION ADMIN: CPT | Performed by: NURSE PRACTITIONER

## 2023-09-06 PROCEDURE — 90686 IIV4 VACC NO PRSV 0.5 ML IM: CPT | Performed by: NURSE PRACTITIONER

## 2023-09-06 ASSESSMENT — ENCOUNTER SYMPTOMS
ABDOMINAL PAIN: 0
CHILLS: 0
HEADACHES: 0
PALPITATIONS: 0
NERVOUS/ANXIOUS: 0
JOINT SWELLING: 0
PARESTHESIAS: 0
DIARRHEA: 0
HEMATOCHEZIA: 0
DIZZINESS: 0
SHORTNESS OF BREATH: 0
NAUSEA: 0
SORE THROAT: 0
FEVER: 0
ARTHRALGIAS: 0
COUGH: 0
EYE PAIN: 0
HEARTBURN: 0
HEMATURIA: 0
MYALGIAS: 0
FREQUENCY: 0
DYSURIA: 0
CONSTIPATION: 0
WEAKNESS: 0

## 2023-09-06 ASSESSMENT — ANXIETY QUESTIONNAIRES
4. TROUBLE RELAXING: NOT AT ALL
1. FEELING NERVOUS, ANXIOUS, OR ON EDGE: SEVERAL DAYS
3. WORRYING TOO MUCH ABOUT DIFFERENT THINGS: NOT AT ALL
5. BEING SO RESTLESS THAT IT IS HARD TO SIT STILL: NOT AT ALL
GAD7 TOTAL SCORE: 1
7. FEELING AFRAID AS IF SOMETHING AWFUL MIGHT HAPPEN: NOT AT ALL
2. NOT BEING ABLE TO STOP OR CONTROL WORRYING: NOT AT ALL
7. FEELING AFRAID AS IF SOMETHING AWFUL MIGHT HAPPEN: NOT AT ALL
8. IF YOU CHECKED OFF ANY PROBLEMS, HOW DIFFICULT HAVE THESE MADE IT FOR YOU TO DO YOUR WORK, TAKE CARE OF THINGS AT HOME, OR GET ALONG WITH OTHER PEOPLE?: NOT DIFFICULT AT ALL
GAD7 TOTAL SCORE: 1
GAD7 TOTAL SCORE: 1
IF YOU CHECKED OFF ANY PROBLEMS ON THIS QUESTIONNAIRE, HOW DIFFICULT HAVE THESE PROBLEMS MADE IT FOR YOU TO DO YOUR WORK, TAKE CARE OF THINGS AT HOME, OR GET ALONG WITH OTHER PEOPLE: NOT DIFFICULT AT ALL
6. BECOMING EASILY ANNOYED OR IRRITABLE: NOT AT ALL

## 2023-09-06 ASSESSMENT — PATIENT HEALTH QUESTIONNAIRE - PHQ9
10. IF YOU CHECKED OFF ANY PROBLEMS, HOW DIFFICULT HAVE THESE PROBLEMS MADE IT FOR YOU TO DO YOUR WORK, TAKE CARE OF THINGS AT HOME, OR GET ALONG WITH OTHER PEOPLE: NOT DIFFICULT AT ALL
SUM OF ALL RESPONSES TO PHQ QUESTIONS 1-9: 3
SUM OF ALL RESPONSES TO PHQ QUESTIONS 1-9: 3

## 2023-09-06 NOTE — TELEPHONE ENCOUNTER
Reason for Call:  Form, our goal is to have forms completed with 72 hours, however, some forms may require a visit or additional information.    Type of letter, form or note:   Biometric Screening    Who is the form from?: Insurance comp    Where did the form come from: Patient or family brought in       What clinic location was the form placed at?: [facility]    Where the form was placed:  On Paula's desk - bin on desk    What number is listed as a contact on the form?: Fax [phone]       Additional comments: Once completed fax to the above number and send to scan    Call taken on 9/6/2023 at 5:31 PM by Paula Velasco CMA

## 2023-09-06 NOTE — PROGRESS NOTES
SUBJECTIVE:   CC: Eric is an 42 year old who presents for preventative health visit.       2023     1:48 PM   Additional Questions   Roomed by Paula CMA   Accompanied by Self       Healthy Habits:     Getting at least 3 servings of Calcium per day:  Yes    Bi-annual eye exam:  Yes    Dental care twice a year:  NO    Sleep apnea or symptoms of sleep apnea:  None    Diet:  Regular (no restrictions)    Frequency of exercise:  6-7 days/week    Duration of exercise:  45-60 minutes    Taking medications regularly:  Yes    Medication side effects:  Not applicable and None    Additional concerns today:  No       Today's PHQ-9 Score:       2023     1:08 PM   PHQ-9 SCORE   PHQ-9 Total Score MyChart 3 (Minimal depression)   PHQ-9 Total Score 3       Social History     Tobacco Use     Smoking status: Former     Types: Cigarettes     Quit date: 2000     Years since quittin.4     Smokeless tobacco: Never     Tobacco comments:     smoked for a couple years in college -- doesn't think he smoked 100 cigarette   Substance Use Topics     Alcohol use: Yes     Comment: Occasional           2023     1:10 PM   Alcohol Use   Prescreen: >3 drinks/day or >7 drinks/week? Yes   AUDIT SCORE  4         2023     1:10 PM   AUDIT - Alcohol Use Disorders Identification Test - Reproduced from the World Health Organization Audit 2001 (Second Edition)   1.  How often do you have a drink containing alcohol? 4 or more times a week   2.  How many drinks containing alcohol do you have on a typical day when you are drinking? 1 or 2   3.  How often do you have five or more drinks on one occasion? Never   4.  How often during the last year have you found that you were not able to stop drinking once you had started? Never   5.  How often during the last year have you failed to do what was normally expected of you because of drinking? Never   6.  How often during the last year have you needed a first drink in the morning to get  yourself going after a heavy drinking session? Never   7.  How often during the last year have you had a feeling of guilt or remorse after drinking? Never   8.  How often during the last year have you been unable to remember what happened the night before because of your drinking? Never   9.  Have you or someone else been injured because of your drinking? No   10. Has a relative, friend, doctor or other health care worker been concerned about your drinking or suggested you cut down? No   TOTAL SCORE 4       Last PSA:   Prostate Specific Antigen Screen   Date Value Ref Range Status   2022 1.18 0.00 - 2.50 ng/mL Final   10/28/2021 1.19 0.00 - 4.00 ug/L Final       Reviewed orders with patient. Reviewed health maintenance and updated orders accordingly - Yes  Lab work is in process  Labs reviewed in EPIC  BP Readings from Last 3 Encounters:   23 120/72   22 128/82   22 126/84    Wt Readings from Last 3 Encounters:   23 97.1 kg (214 lb)   22 95.2 kg (209 lb 12.8 oz)   22 96.6 kg (213 lb)                  Patient Active Problem List   Diagnosis     Anxiety     Major depression in complete remission (H) - more situational related to wife's cancer. improved once she went into remission.     Other eczema - R ankle; uses topical steriod periodically     Lymph node enlargement - L anterior superior cervical chain. Pt reports started after strep episode in  and has been unchanged since.      Localized superficial swelling, mass, or lump - R distal upper arm. ?epidermoid cyst versus less likely other.     Past Surgical History:   Procedure Laterality Date     NO HISTORY OF SURGERY         Social History     Tobacco Use     Smoking status: Former     Types: Cigarettes     Quit date: 2000     Years since quittin.4     Smokeless tobacco: Never     Tobacco comments:     smoked for a couple years in college -- doesn't think he smoked 100 cigarette   Substance Use Topics      Alcohol use: Yes     Comment: Occasional     Family History   Problem Relation Age of Onset     Colon Polyps Father      Skin Cancer Father      Familial Adenomatous Polyposis (FAP) Father      Pulmonary fibrosis Father      Coronary Artery Disease Maternal Grandfather         40's, smoker/heavy drinker     Diabetes Paternal Grandmother      Thyroid Disease Paternal Grandmother         non cancerous     Diabetes Paternal Grandfather      Skin Cancer Paternal Aunt      Depression Mother      Anxiety Disorder Mother      Hypertension No family hx of      Hyperlipidemia No family hx of      Cerebrovascular Disease No family hx of      Breast Cancer No family hx of      Colon Cancer No family hx of      Prostate Cancer No family hx of          Current Outpatient Medications   Medication Sig Dispense Refill     clobetasol (TEMOVATE) 0.05 % external cream Apply topically 2 times daily A thin layer to aa on right ankle x 2-3 weeks. Do not use on face or body folds. 30 g 1     Multiple Vitamins-Minerals (MULTIVITAMIN ADULT PO)        No Known Allergies    Reviewed and updated as needed this visit by clinical staff   Tobacco  Allergies  Meds  Problems  Med Hx  Surg Hx  Fam Hx          Reviewed and updated as needed this visit by Provider   Tobacco  Allergies  Meds  Problems  Med Hx  Surg Hx  Fam Hx           Review of Systems   Constitutional:  Negative for chills and fever.   HENT:  Positive for hearing loss. Negative for congestion, ear pain and sore throat.    Eyes:  Negative for pain and visual disturbance.   Respiratory:  Negative for cough and shortness of breath.    Cardiovascular:  Negative for chest pain, palpitations and peripheral edema.   Gastrointestinal:  Negative for abdominal pain, constipation, diarrhea, heartburn, hematochezia and nausea.   Genitourinary:  Negative for dysuria, frequency, genital sores, hematuria, impotence, penile discharge and urgency.   Musculoskeletal:  Negative for  "arthralgias, joint swelling and myalgias.   Skin:  Negative for rash.   Neurological:  Negative for dizziness, weakness, headaches and paresthesias.   Psychiatric/Behavioral:  Negative for mood changes. The patient is not nervous/anxious.        OBJECTIVE:   /72 (BP Location: Left arm, Patient Position: Chair, Cuff Size: Adult Large)   Pulse 85   Temp 97.4  F (36.3  C) (Tympanic)   Resp 13   Ht 1.905 m (6' 3\")   Wt 97.1 kg (214 lb)   SpO2 100%   BMI 26.75 kg/m      Physical Exam  GENERAL: healthy, alert and no distress  EYES: Eyes grossly normal to inspection, PERRL and conjunctivae and sclerae normal  HENT: ear canals and TM's normal, nose and mouth without ulcers or lesions  NECK: no adenopathy, no asymmetry, masses, or scars and thyroid normal to palpation  RESP: lungs clear to auscultation - no rales, rhonchi or wheezes  CV: regular rate and rhythm, normal S1 S2, no S3 or S4, no murmur, click or rub, no peripheral edema and peripheral pulses strong  ABDOMEN: soft, nontender, no hepatosplenomegaly, no masses and bowel sounds normal  MS: no gross musculoskeletal defects noted, no edema  SKIN: no suspicious lesions or rashes  NEURO: Normal strength and tone, mentation intact and speech normal  PSYCH: mentation appears normal, affect normal/bright    Diagnostic Test Results:  Labs reviewed in Epic    ASSESSMENT/PLAN:   Eric was seen today for physical.    Diagnoses and all orders for this visit:    Routine general medical examination at a health care facility  Wellness exam completed today.    Fasting labs today.    Will notify of lab results.  -     REVIEW OF HEALTH MAINTENANCE PROTOCOL ORDERS    Mixed hearing loss, bilateral  -     Adult Audiology  Referral; Future    Need for influenza vaccination  -     INFLUENZA VACCINE IM > 6 MONTHS VALENT IIV4 (AFLURIA/FLUZONE)    Screening for deficiency anemia  -     CBC with platelets; Future    Screening for thyroid disorder  -     TSH with free T4 " "reflex; Future    Screening for hyperlipidemia  -     Lipid panel reflex to direct LDL Fasting; Future    Screening for diabetes mellitus  -     Comprehensive metabolic panel (BMP + Alb, Alk Phos, ALT, AST, Total. Bili, TP); Future        Patient has been advised of split billing requirements and indicates understanding: Yes      COUNSELING:   Reviewed preventive health counseling, as reflected in patient instructions      BMI:   Estimated body mass index is 26.75 kg/m  as calculated from the following:    Height as of this encounter: 1.905 m (6' 3\").    Weight as of this encounter: 97.1 kg (214 lb).         He reports that he quit smoking about 23 years ago. His smoking use included cigarettes. He has never used smokeless tobacco.             STELLA Hsieh [facility] LAKE[facility]swers submitted by the patient for this visit:  Patient Health Questionnaire (Submitted on 9/6/2023)  If you checked off any problems, how difficult have these problems made it for you to do your work, take care of things at home, or get along with other people?: Not difficult at all  PHQ9 TOTAL SCORE: 3  PRABHJOT-7 (Submitted on 9/6/2023)  PRABHJOT 7 TOTAL SCORE: 1    "

## 2023-09-14 ENCOUNTER — LAB (OUTPATIENT)
Dept: LAB | Facility: CLINIC | Age: 43
End: 2023-09-14
Payer: COMMERCIAL

## 2023-09-14 DIAGNOSIS — Z13.1 SCREENING FOR DIABETES MELLITUS: ICD-10-CM

## 2023-09-14 DIAGNOSIS — Z13.0 SCREENING FOR DEFICIENCY ANEMIA: ICD-10-CM

## 2023-09-14 DIAGNOSIS — Z13.220 SCREENING FOR HYPERLIPIDEMIA: ICD-10-CM

## 2023-09-14 DIAGNOSIS — Z13.29 SCREENING FOR THYROID DISORDER: ICD-10-CM

## 2023-09-14 LAB
ALBUMIN SERPL BCG-MCNC: 4.6 G/DL (ref 3.5–5.2)
ALP SERPL-CCNC: 85 U/L (ref 40–129)
ALT SERPL W P-5'-P-CCNC: 23 U/L (ref 0–70)
ANION GAP SERPL CALCULATED.3IONS-SCNC: 11 MMOL/L (ref 7–15)
AST SERPL W P-5'-P-CCNC: 27 U/L (ref 0–45)
BILIRUB SERPL-MCNC: 0.6 MG/DL
BUN SERPL-MCNC: 18 MG/DL (ref 6–20)
CALCIUM SERPL-MCNC: 9.7 MG/DL (ref 8.6–10)
CHLORIDE SERPL-SCNC: 101 MMOL/L (ref 98–107)
CHOLEST SERPL-MCNC: 148 MG/DL
CREAT SERPL-MCNC: 1.08 MG/DL (ref 0.67–1.17)
DEPRECATED HCO3 PLAS-SCNC: 28 MMOL/L (ref 22–29)
EGFRCR SERPLBLD CKD-EPI 2021: 87 ML/MIN/1.73M2
ERYTHROCYTE [DISTWIDTH] IN BLOOD BY AUTOMATED COUNT: 12.5 % (ref 10–15)
GLUCOSE SERPL-MCNC: 90 MG/DL (ref 70–99)
HCT VFR BLD AUTO: 48.7 % (ref 40–53)
HDLC SERPL-MCNC: 48 MG/DL
HGB BLD-MCNC: 17.4 G/DL (ref 13.3–17.7)
LDLC SERPL CALC-MCNC: 85 MG/DL
MCH RBC QN AUTO: 32.2 PG (ref 26.5–33)
MCHC RBC AUTO-ENTMCNC: 35.7 G/DL (ref 31.5–36.5)
MCV RBC AUTO: 90 FL (ref 78–100)
NONHDLC SERPL-MCNC: 100 MG/DL
PLATELET # BLD AUTO: 243 10E3/UL (ref 150–450)
POTASSIUM SERPL-SCNC: 4.2 MMOL/L (ref 3.4–5.3)
PROT SERPL-MCNC: 7 G/DL (ref 6.4–8.3)
RBC # BLD AUTO: 5.41 10E6/UL (ref 4.4–5.9)
SODIUM SERPL-SCNC: 140 MMOL/L (ref 136–145)
TRIGL SERPL-MCNC: 77 MG/DL
TSH SERPL DL<=0.005 MIU/L-ACNC: 0.7 UIU/ML (ref 0.3–4.2)
WBC # BLD AUTO: 5 10E3/UL (ref 4–11)

## 2023-09-14 PROCEDURE — 80053 COMPREHEN METABOLIC PANEL: CPT

## 2023-09-14 PROCEDURE — 80061 LIPID PANEL: CPT

## 2023-09-14 PROCEDURE — 84443 ASSAY THYROID STIM HORMONE: CPT

## 2023-09-14 PROCEDURE — 85027 COMPLETE CBC AUTOMATED: CPT

## 2023-09-14 PROCEDURE — 36415 COLL VENOUS BLD VENIPUNCTURE: CPT

## 2023-09-15 NOTE — TELEPHONE ENCOUNTER
Form has been filled out.    Just need providers signature.  Form put in providers in-basket    Paula COOPER CMA

## 2023-09-15 NOTE — RESULT ENCOUNTER NOTE
Dear Eric,    Here is a summary of your recent test results:    -All of your labs are normal.    For additional lab test information, labtestsonline.org is an excellent reference.    In addition, here is a list of due or overdue Health Maintenance reminders:    There are no preventive care reminders to display for this patient.    Please call us at [phone] (or use GAMEVIL) to address the above recommendations if needed.    Thank you for choosing [facility].  It was an honor and a privilege to participate in your care.       Healthy regards,    Aydee Xiao, RAJNIP  [facility]

## 2023-09-18 NOTE — TELEPHONE ENCOUNTER
Form has been signed by provider.    Faxed to [phone] -- LIBAN also completed.    Sent to scan    Paula COOPER CMA

## 2024-01-10 ENCOUNTER — OFFICE VISIT (OUTPATIENT)
Dept: AUDIOLOGY | Facility: CLINIC | Age: 44
End: 2024-01-10
Attending: NURSE PRACTITIONER
Payer: COMMERCIAL

## 2024-01-10 DIAGNOSIS — Z01.10 EXAMINATION OF EARS AND HEARING: Primary | ICD-10-CM

## 2024-01-10 DIAGNOSIS — H90.6 MIXED HEARING LOSS, BILATERAL: ICD-10-CM

## 2024-01-10 DIAGNOSIS — H93.13 TINNITUS OF BOTH EARS: ICD-10-CM

## 2024-01-10 PROCEDURE — 92550 TYMPANOMETRY & REFLEX THRESH: CPT | Performed by: AUDIOLOGIST

## 2024-01-10 PROCEDURE — 92557 COMPREHENSIVE HEARING TEST: CPT | Performed by: AUDIOLOGIST

## 2024-01-10 NOTE — PROGRESS NOTES
AUDIOLOGY REPORT    SUBJECTIVE:  Eric Mandel is a 43 year old male who was seen in the Audiology Clinic at the [facility] for audiologic evaluation, referred by Aydee Xiao C.N.P.. The patient reports an increased difficulty hearing in background noise and complex environments. He reports bilateral tinnitus that is only noticeable in quiet environments. The patient denies  bilateral otalgia, bilateral drainage, bilateral aural fullness, and dizziness. No previous ear surgeries reported. He reports a history of noise exposure (music).  The patient notes difficulty with communication in a variety of listening situations.      OBJECTIVE:  Fall Risk Screen:  1. Have you fallen two or more times in the past year? No  2. Have you fallen and had an injury in the past year? No    Timed Up and Go Score (in seconds): not tested  Is patient a fall risk? No  Referral initiated: No  Fall Risk Assessment Completed by Audiology    Otoscopic exam indicates ears are clear of cerumen bilaterally     Pure Tone Thresholds assessed using conventional audiometry with good  reliability from 250-8000 Hz bilaterally using insert earphones and circumaural headphones     RIGHT:  Normal hearing sensitivity at all frequencies tested    LEFT:    Normal hearing sensitivity at all frequencies tested    Tympanogram:    RIGHT: normal eardrum mobility    LEFT:   normal eardrum mobility    Reflexes (reported by stimulus ear):  RIGHT: Ipsilateral is present at normal levels  RIGHT: Contralateral is present at elevated levels  LEFT:   Ipsilateral is present at normal levels  LEFT:   Contralateral is present at normal levels    Speech Reception Threshold:    RIGHT: 15 dB HL    LEFT:   5 dB HL    Word Recognition Score:     RIGHT: 100% at 50 dB HL using NU-6 recorded word list.    LEFT:   100% at 50 dB HL using NU-6 recorded word list.      ASSESSMENT:     Today's results revealed normal hearing at  all frequencies tested bilaterally. Today s results were discussed with the patient in detail.     PLAN:  Patient was counseled regarding hearing loss and impact on communication. Briefly reviewed good communication strategies.  It is recommended that the patient repeat the hearing evaluation in 4-5 years, sooner if concerns arise.  Please call this clinic with questions regarding these results or recommendations.        Abel Glass  Audiologist  [place] License  #[id number]

## 2024-08-07 ENCOUNTER — PATIENT OUTREACH (OUTPATIENT)
Dept: CARE COORDINATION | Facility: CLINIC | Age: 44
End: 2024-08-07
Payer: COMMERCIAL

## 2024-08-21 ENCOUNTER — PATIENT OUTREACH (OUTPATIENT)
Dept: CARE COORDINATION | Facility: CLINIC | Age: 44
End: 2024-08-21
Payer: COMMERCIAL

## 2024-09-26 ENCOUNTER — OFFICE VISIT (OUTPATIENT)
Dept: FAMILY MEDICINE | Facility: CLINIC | Age: 44
End: 2024-09-26
Payer: COMMERCIAL

## 2024-09-26 VITALS
HEART RATE: 83 BPM | DIASTOLIC BLOOD PRESSURE: 76 MMHG | OXYGEN SATURATION: 99 % | SYSTOLIC BLOOD PRESSURE: 120 MMHG | RESPIRATION RATE: 12 BRPM | HEIGHT: 75 IN | BODY MASS INDEX: 26.61 KG/M2 | WEIGHT: 214 LBS | TEMPERATURE: 97.6 F

## 2024-09-26 DIAGNOSIS — Z12.11 SCREENING FOR COLON CANCER: ICD-10-CM

## 2024-09-26 DIAGNOSIS — Z11.3 SCREENING EXAMINATION FOR STI: ICD-10-CM

## 2024-09-26 DIAGNOSIS — Z13.0 SCREENING FOR DEFICIENCY ANEMIA: ICD-10-CM

## 2024-09-26 DIAGNOSIS — Z13.1 SCREENING FOR DIABETES MELLITUS: ICD-10-CM

## 2024-09-26 DIAGNOSIS — Z13.220 SCREENING FOR HYPERLIPIDEMIA: ICD-10-CM

## 2024-09-26 DIAGNOSIS — Z00.00 ROUTINE GENERAL MEDICAL EXAMINATION AT A HEALTH CARE FACILITY: Primary | ICD-10-CM

## 2024-09-26 PROBLEM — F32.9 MAJOR DEPRESSION: Status: RESOLVED | Noted: 2018-07-31 | Resolved: 2024-09-26

## 2024-09-26 PROBLEM — F41.9 ANXIETY: Status: RESOLVED | Noted: 2018-07-31 | Resolved: 2024-09-26

## 2024-09-26 LAB
ALBUMIN SERPL BCG-MCNC: 4.3 G/DL (ref 3.5–5.2)
ALP SERPL-CCNC: 78 U/L (ref 40–150)
ALT SERPL W P-5'-P-CCNC: 19 U/L (ref 0–70)
ANION GAP SERPL CALCULATED.3IONS-SCNC: 9 MMOL/L (ref 7–15)
AST SERPL W P-5'-P-CCNC: 22 U/L (ref 0–45)
BILIRUB SERPL-MCNC: 0.5 MG/DL
BUN SERPL-MCNC: 17.2 MG/DL (ref 6–20)
C TRACH DNA SPEC QL NAA+PROBE: NEGATIVE
CALCIUM SERPL-MCNC: 9.3 MG/DL (ref 8.8–10.4)
CHLORIDE SERPL-SCNC: 102 MMOL/L (ref 98–107)
CHOLEST SERPL-MCNC: 137 MG/DL
CREAT SERPL-MCNC: 1.11 MG/DL (ref 0.67–1.17)
EGFRCR SERPLBLD CKD-EPI 2021: 84 ML/MIN/1.73M2
ERYTHROCYTE [DISTWIDTH] IN BLOOD BY AUTOMATED COUNT: 13 % (ref 10–15)
FASTING STATUS PATIENT QL REPORTED: YES
FASTING STATUS PATIENT QL REPORTED: YES
GLUCOSE SERPL-MCNC: 89 MG/DL (ref 70–99)
HBV SURFACE AG SERPL QL IA: NONREACTIVE
HCO3 SERPL-SCNC: 27 MMOL/L (ref 22–29)
HCT VFR BLD AUTO: 48.5 % (ref 40–53)
HCV AB SERPL QL IA: NONREACTIVE
HDLC SERPL-MCNC: 46 MG/DL
HGB BLD-MCNC: 16.7 G/DL (ref 13.3–17.7)
HIV 1+2 AB+HIV1 P24 AG SERPL QL IA: NONREACTIVE
LDLC SERPL CALC-MCNC: 78 MG/DL
MCH RBC QN AUTO: 31.7 PG (ref 26.5–33)
MCHC RBC AUTO-ENTMCNC: 34.4 G/DL (ref 31.5–36.5)
MCV RBC AUTO: 92 FL (ref 78–100)
N GONORRHOEA DNA SPEC QL NAA+PROBE: NEGATIVE
NONHDLC SERPL-MCNC: 91 MG/DL
PLATELET # BLD AUTO: 244 10E3/UL (ref 150–450)
POTASSIUM SERPL-SCNC: 4.2 MMOL/L (ref 3.4–5.3)
PROT SERPL-MCNC: 6.8 G/DL (ref 6.4–8.3)
RBC # BLD AUTO: 5.26 10E6/UL (ref 4.4–5.9)
SODIUM SERPL-SCNC: 138 MMOL/L (ref 135–145)
T PALLIDUM AB SER QL: NONREACTIVE
TRIGL SERPL-MCNC: 66 MG/DL
WBC # BLD AUTO: 6.4 10E3/UL (ref 4–11)

## 2024-09-26 PROCEDURE — 36415 COLL VENOUS BLD VENIPUNCTURE: CPT | Performed by: FAMILY MEDICINE

## 2024-09-26 PROCEDURE — 86803 HEPATITIS C AB TEST: CPT | Performed by: FAMILY MEDICINE

## 2024-09-26 PROCEDURE — 80053 COMPREHEN METABOLIC PANEL: CPT | Performed by: FAMILY MEDICINE

## 2024-09-26 PROCEDURE — 85027 COMPLETE CBC AUTOMATED: CPT | Performed by: FAMILY MEDICINE

## 2024-09-26 PROCEDURE — 87389 HIV-1 AG W/HIV-1&-2 AB AG IA: CPT | Performed by: FAMILY MEDICINE

## 2024-09-26 PROCEDURE — 86780 TREPONEMA PALLIDUM: CPT | Performed by: FAMILY MEDICINE

## 2024-09-26 PROCEDURE — 87340 HEPATITIS B SURFACE AG IA: CPT | Performed by: FAMILY MEDICINE

## 2024-09-26 PROCEDURE — 80061 LIPID PANEL: CPT | Performed by: FAMILY MEDICINE

## 2024-09-26 PROCEDURE — 99396 PREV VISIT EST AGE 40-64: CPT | Performed by: FAMILY MEDICINE

## 2024-09-26 PROCEDURE — 87591 N.GONORRHOEAE DNA AMP PROB: CPT | Performed by: FAMILY MEDICINE

## 2024-09-26 PROCEDURE — 87491 CHLMYD TRACH DNA AMP PROBE: CPT | Performed by: FAMILY MEDICINE

## 2024-09-26 ASSESSMENT — PATIENT HEALTH QUESTIONNAIRE - PHQ9
10. IF YOU CHECKED OFF ANY PROBLEMS, HOW DIFFICULT HAVE THESE PROBLEMS MADE IT FOR YOU TO DO YOUR WORK, TAKE CARE OF THINGS AT HOME, OR GET ALONG WITH OTHER PEOPLE: NOT DIFFICULT AT ALL
SUM OF ALL RESPONSES TO PHQ QUESTIONS 1-9: 0
SUM OF ALL RESPONSES TO PHQ QUESTIONS 1-9: 0

## 2024-09-26 ASSESSMENT — PAIN SCALES - GENERAL: PAINLEVEL: NO PAIN (0)

## 2024-09-26 NOTE — PATIENT INSTRUCTIONS
Patient Education   Preventive Care Advice   This is general advice given by our system to help you stay healthy. However, your care team may have specific advice just for you. Please talk to your care team about your preventive care needs.  Nutrition  Eat 5 or more servings of fruits and vegetables each day.  Try wheat bread, brown rice and whole grain pasta (instead of white bread, rice, and pasta).  Get enough calcium and vitamin D. Check the label on foods and aim for 100% of the RDA (recommended daily allowance).  Lifestyle  Exercise at least 150 minutes each week  (30 minutes a day, 5 days a week).  Do muscle strengthening activities 2 days a week. These help control your weight and prevent disease.  No smoking.  Wear sunscreen to prevent skin cancer.  Have a dental exam and cleaning every 6 months.  Yearly exams  See your health care team every year to talk about:  Any changes in your health.  Any medicines your care team has prescribed.  Preventive care, family planning, and ways to prevent chronic diseases.  Shots (vaccines)   HPV shots (up to age 26), if you've never had them before.  Hepatitis B shots (up to age 59), if you've never had them before.  COVID-19 shot: Get this shot when it's due.  Flu shot: Get a flu shot every year.  Tetanus shot: Get a tetanus shot every 10 years.  Pneumococcal, hepatitis A, and RSV shots: Ask your care team if you need these based on your risk.  Shingles shot (for age 50 and up)  General health tests  Diabetes screening:  Starting at age 35, Get screened for diabetes at least every 3 years.  If you are younger than age 35, ask your care team if you should be screened for diabetes.  Cholesterol test: At age 39, start having a cholesterol test every 5 years, or more often if advised.  Bone density scan (DEXA): At age 50, ask your care team if you should have this scan for osteoporosis (brittle bones).  Hepatitis C: Get tested at least once in your life.  STIs (sexually  transmitted infections)  Before age 24: Ask your care team if you should be screened for STIs.  After age 24: Get screened for STIs if you're at risk. You are at risk for STIs (including HIV) if:  You are sexually active with more than one person.  You don't use condoms every time.  You or a partner was diagnosed with a sexually transmitted infection.  If you are at risk for HIV, ask about PrEP medicine to prevent HIV.  Get tested for HIV at least once in your life, whether you are at risk for HIV or not.  Cancer screening tests  Cervical cancer screening: If you have a cervix, begin getting regular cervical cancer screening tests starting at age 21.  Breast cancer scan (mammogram): If you've ever had breasts, begin having regular mammograms starting at age 40. This is a scan to check for breast cancer.  Colon cancer screening: It is important to start screening for colon cancer at age 45.  Have a colonoscopy test every 10 years (or more often if you're at risk) Or, ask your provider about stool tests like a FIT test every year or Cologuard test every 3 years.  To learn more about your testing options, visit:   .  For help making a decision, visit:   https://bit.ly/sc72943.  Prostate cancer screening test: If you have a prostate, ask your care team if a prostate cancer screening test (PSA) at age 55 is right for you.  Lung cancer screening: If you are a current or former smoker ages 50 to 80, ask your care team if ongoing lung cancer screenings are right for you.  For informational purposes only. Not to replace the advice of your health care provider. Copyright   2023 [employer]. All rights reserved. Clinically reviewed by the [facility] Transitions Program. [employer] [id number] - REV 01/24.

## 2024-09-26 NOTE — PROGRESS NOTES
"Preventive Care Visit  [facility] [place]  Bran Bonilla DO, Family Medicine  Sep 26, 2024    Assessment & Plan     Routine general medical examination at a health care facility  Health maintenance reviewed and updated. Emphasized importance of balanced diet and regular exercise.  Screening for deficiency anemia  - CBC with platelets; Future  - CBC with platelets    Screening for hyperlipidemia  - Lipid panel reflex to direct LDL Fasting; Future  - Lipid panel reflex to direct LDL Fasting    Screening for diabetes mellitus  - Comprehensive metabolic panel (BMP + Alb, Alk Phos, ALT, AST, Total. Bili, TP); Future  - Comprehensive metabolic panel (BMP + Alb, Alk Phos, ALT, AST, Total. Bili, TP)    Screening for colon cancer    - Colonoscopy Screening  Referral; Future    Screening examination for STI  - Treponema Abs w Reflex to RPR and Titer  - Hepatitis B surface antigen  - Hepatitis C Screen Reflex to HCV RNA Quant and Genotype  - Neisseria gonorrhoeae PCR  - Chlamydia trachomatis PCR  - HIV Antigen Antibody Combo [place]    Patient has been advised of split billing requirements and indicates understanding: Yes        BMI  Estimated body mass index is 26.75 kg/m  as calculated from the following:    Height as of this encounter: 1.905 m (6' 3\").    Weight as of this encounter: 97.1 kg (214 lb).       Counseling  Appropriate preventive services were addressed with this patient    Kendra Reyes is a 43 year old, presenting for the following:  Physical        9/26/2024     8:40 AM   Additional Questions   Roomed by LAILA LANDERS   Accompanied by SELF        Health Care Directive  Patient does not have a Health Care Directive or Living Will    HPI        9/26/2024   General Health   How would you rate your overall physical health? Good   Feel stress (tense, anxious, or unable to sleep) Only a little            9/26/2024   Nutrition   Three or more servings of calcium each day? Yes   Diet: " Regular (no restrictions)   How many servings of fruit and vegetables per day? (!) 2-3   How many sweetened beverages each day? 0-1            9/26/2024   Exercise   Days per week of moderate/strenous exercise 6 days   Average minutes spent exercising at this level 60 min            9/26/2024   Social Factors   Frequency of gathering with friends or relatives Once a week   Worry food won't last until get money to buy more No   Food not last or not have enough money for food? No   Do you have housing? (Housing is defined as stable permanent housing and does not include staying ouside in a car, in a tent, in an abandoned building, in an overnight shelter, or couch-surfing.) Yes   Are you worried about losing your housing? No   Lack of transportation? No   Unable to get utilities (heat,electricity)? No            9/26/2024   Dental   Dentist two times every year? Yes            9/26/2024   TB Screening   Were you born outside of the [place]? No          Today's PHQ-9 Score:       9/26/2024     7:37 AM   PHQ-9 SCORE   PHQ-9 Total Score MyChart 0   PHQ-9 Total Score 0         9/26/2024   Substance Use   Alcohol more than 3/day or more than 7/wk Yes   How often do you have a drink containing alcohol 4 or more times a week   How many alcohol drinks on typical day 1 or 2   How often do you have 5+ drinks at one occasion Never   Audit 2/3 Score 0   How often not able to stop drinking once started Never   How often failed to do what normally expected Never   How often needed first drink in am after a heavy drinking session Never   How often feeling of guilt or remorse after drinking Never   How often unable to remember what happened the night before Never   Have you or someone else been injured because of your drinking No   Has anyone been concerned or suggested you cut down on drinking No   TOTAL SCORE - AUDIT 4   Do you use any other substances recreationally? (!) CANNABIS PRODUCTS        Social History     Tobacco Use     "Smoking status: Former     Current packs/day: 0.00     Types: Cigarettes     Quit date: 2000     Years since quittin.4    Smokeless tobacco: Never    Tobacco comments:     smoked for a couple years in college -- doesn't think he smoked 100 cigarette   Vaping Use    Vaping status: Never Used   Substance Use Topics    Alcohol use: Yes     Comment: Occasional    Drug use: No           2024   STI Screening   New sexual partner(s) since last STI/HIV test? (!) YES      ASCVD Risk   The 10-year ASCVD risk score (Fernandez MELLO, et al., 2019) is: 1%    Values used to calculate the score:      Age: 43 years      Sex: Male      Is Non- : No      Diabetic: No      Tobacco smoker: No      Systolic Blood Pressure: 120 mmHg      Is BP treated: No      HDL Cholesterol: 48 mg/dL      Total Cholesterol: 148 mg/dL          2024   Contraception/Family Planning   Questions about contraception or family planning No      Reviewed and updated as needed this visit by Provider   Tobacco     Med Hx  Surg Hx   Soc Hx Sexual Activity          Review of Systems  Constitutional, HEENT, cardiovascular, pulmonary, gi and gu systems are negative, except as otherwise noted.     Objective    Exam  /76   Pulse 83   Temp 97.6  F (36.4  C) (Tympanic)   Resp 12   Ht 1.905 m (6' 3\")   Wt 97.1 kg (214 lb)   SpO2 99%   BMI 26.75 kg/m     Estimated body mass index is 26.75 kg/m  as calculated from the following:    Height as of this encounter: 1.905 m (6' 3\").    Weight as of this encounter: 97.1 kg (214 lb).    Physical Exam  GENERAL: alert and no distress  EYES: Eyes grossly normal to inspection  HENT: ear canals and TM's normal, nose and mouth without ulcers or lesions  NECK: no asymmetry, masses, or scars, thyroid normal to palpation, and left anterior superior cervical lymph node palpable - non tender  RESP: lungs clear to auscultation - no rales, rhonchi or wheezes  CV: regular rates and " rhythm, normal S1 S2, no S3 or S4, and no murmur, click or rub  ABDOMEN: soft, nontender, no hepatosplenomegaly, no masses and bowel sounds normal  MS: no gross musculoskeletal defects noted, no edema  SKIN: no suspicious lesions or rashes  PSYCH: mentation appears normal, affect normal/bright        Signed Electronically by: Bran Bonilla,

## 2025-02-25 ENCOUNTER — TELEPHONE (OUTPATIENT)
Dept: GASTROENTEROLOGY | Facility: CLINIC | Age: 45
End: 2025-02-25
Payer: COMMERCIAL

## 2025-02-25 NOTE — TELEPHONE ENCOUNTER
Pre assessment completed for upcoming procedure.   (Please see previous telephone encounter notes for complete details)           Procedure details:    Arrival time and facility location reviewed.    Pre op exam needed? No.    Designated  policy reviewed. Instructed to have someone stay 6  hours post procedure.       Medication review:    Medications reviewed. Please see supporting documentation below. Holding recommendations discussed (if applicable).   Cannabis/Marijuana : STOP night before procedure.      Prep for procedure:     Procedure prep instructions reviewed.        Any additional information needed:  N/A      Patient verbalized understanding and had no questions or concerns at this time.      Elvira Nieves LPN  Endoscopy Procedure Pre Assessment   [phone] option 3

## 2025-02-25 NOTE — TELEPHONE ENCOUNTER
Pre visit planning completed.      Procedure details:    Patient scheduled for Colonoscopy on 3/11/25.     Arrival time: 0730. Procedure time 0815    Facility location: [facility]; [place] [address]. Check in location: Main entrance, door #1 on the North side of the building under roundabout awning. DO NOT GO TO SURGERY/ED ENTRANCE.     Sedation type: Conscious sedation     Pre op exam needed? No.    Indication for procedure: screening       Chart review:     Electronic implanted devices? No    Recent diagnosis of diverticulitis within the last 6 weeks? No      Medication review:    Diabetic? No    Anticoagulants? No    Weight loss medication/injectable? No GLP-1 medication per patient's medication list. Nursing to verify with pre-assessment call.    Other medication HOLDING recommendations:  Cannabis/Marijuana: Stop night before procedure.      Prep for procedure:     Bowel prep recommendation: Standard Miralax.   Due to: standard bowel prep    Procedure information and instructions sent via [employer]         Kaycee Rodarte RN  Endoscopy Procedure Pre Assessment   [phone] option 3

## 2025-02-25 NOTE — TELEPHONE ENCOUNTER
"Endoscopy Scheduling Screen    Have you had any respiratory illness or flu-like symptoms in the last 10 days?  No    What is your communication preference for Instructions and/or Bowel Prep?   MyChart    What insurance is in the chart?  Other:  BCBS    Ordering/Referring Provider:     MARYBETH GARCÍA      (If ordering provider performs procedure, schedule with ordering provider unless otherwise instructed. )    BMI: Estimated body mass index is 26.75 kg/m  as calculated from the following:    Height as of 9/26/24: 1.905 m (6' 3\").    Weight as of 9/26/24: 97.1 kg (214 lb).     Sedation Ordered  moderate sedation.   If patient BMI > 50 do not schedule in [facility].    If patient BMI > 45 do not schedule at [facility].    Are you taking methadone or Suboxone?  NO, No RN review required.    Have you been diagnosed and are being treated for severe PTSD or severe anxiety?  NO, No RN review required.    Are you taking any prescription medications for pain 3 or more times per week?   NO, No RN review required.    Do you have a history of malignant hyperthermia?  No    (Females) Are you currently pregnant?        Have you been diagnosed or told you have pulmonary hypertension?   No    Do you have an LVAD?  No    Have you been told you have moderate to severe sleep apnea?  No.    Have you been told you have COPD, asthma, or any other lung disease?  No    Do you  have a history of any heart conditions or any upcoming cardiac exams like an echo, angiogram, stress test, or ablation?  No     Have you ever had or are you waiting for an organ transplant?  No. Continue scheduling, no site restrictions.    Have you had a stroke or transient ischemic attack (TIA aka \"mini stroke\") in the last 2 years?   No.    Have you been diagnosed with or been told you have cirrhosis of the liver?   No.    Are you currently on dialysis?   No    Do you need assistance transferring?   No    BMI: Estimated body mass index is 26.75 kg/m  as calculated from the " "following:    Height as of 9/26/24: 1.905 m (6' 3\").    Weight as of 9/26/24: 97.1 kg (214 lb).     Is patients BMI > 40 and scheduling location [facility]?  No    Do you take an injectable or oral medication for weight loss or diabetes (excluding insulin)?  No    Do you take the medication Naltrexone?  No    Do you take blood thinners?  No       Prep   Are you currently on dialysis or do you have chronic kidney disease?  No    Do you have a diagnosis of diabetes?  No    Do you have a diagnosis of cystic fibrosis (CF)?  No    On a regular basis do you go 3 -5 days between bowel movements?  No    BMI > 40?  No    Preferred Pharmacy:    [id number] - [place] [address] AT [place] [address] [address]  [facility] [address]  Phone: [phone] Fax: [phone]          Final Scheduling Details     Procedure scheduled  Colonoscopy    Surgeon:  Ludy     Date of procedure:  3/11/25     Pre-OP / PAC:   No - Not required for this site.    Location  [facility] - Per order.    Sedation   Moderate Sedation - Per order.      Patient Reminders:   You will receive a call from a Nurse to review instructions and health history.  This assessment must be completed prior to your procedure.  Failure to complete the Nurse assessment may result in the procedure being cancelled.      On the day of your procedure, please designate an adult(s) who can drive you home stay with you for the next 24 hours. The medicines used in the exam will make you sleepy. You will not be able to drive.      You cannot take public transportation, ride share services, or non-medical taxi service without a responsible caregiver.  Medical transport services are allowed with the requirement that a responsible caregiver will receive you at your destination.  We require that drivers and caregivers are confirmed prior to your procedure.   "

## 2025-03-11 ENCOUNTER — HOSPITAL ENCOUNTER (OUTPATIENT)
Facility: CLINIC | Age: 45
Discharge: HOME | End: 2025-03-11
Attending: INTERNAL MEDICINE | Admitting: INTERNAL MEDICINE
Payer: COMMERCIAL

## 2025-03-11 VITALS
BODY MASS INDEX: 23.75 KG/M2 | WEIGHT: 191 LBS | DIASTOLIC BLOOD PRESSURE: 82 MMHG | TEMPERATURE: 98.1 F | HEIGHT: 75 IN | OXYGEN SATURATION: 96 % | HEART RATE: 69 BPM | SYSTOLIC BLOOD PRESSURE: 118 MMHG | RESPIRATION RATE: 16 BRPM

## 2025-03-11 LAB — COLONOSCOPY: NORMAL

## 2025-03-11 PROCEDURE — 88305 TISSUE EXAM BY PATHOLOGIST: CPT | Mod: 26 | Performed by: PATHOLOGY

## 2025-03-11 PROCEDURE — 88305 TISSUE EXAM BY PATHOLOGIST: CPT | Mod: TC | Performed by: INTERNAL MEDICINE

## 2025-03-11 PROCEDURE — 99153 MOD SED SAME PHYS/QHP EA: CPT | Mod: PT | Performed by: INTERNAL MEDICINE

## 2025-03-11 PROCEDURE — 88342 IMHCHEM/IMCYTCHM 1ST ANTB: CPT | Mod: 26 | Performed by: PATHOLOGY

## 2025-03-11 PROCEDURE — 45385 COLONOSCOPY W/LESION REMOVAL: CPT | Mod: PT | Performed by: INTERNAL MEDICINE

## 2025-03-11 PROCEDURE — 45381 COLONOSCOPY SUBMUCOUS NJX: CPT | Performed by: INTERNAL MEDICINE

## 2025-03-11 PROCEDURE — 250N000011 HC RX IP 250 OP 636: Performed by: INTERNAL MEDICINE

## 2025-03-11 PROCEDURE — 88342 IMHCHEM/IMCYTCHM 1ST ANTB: CPT | Mod: TC | Performed by: INTERNAL MEDICINE

## 2025-03-11 PROCEDURE — 88341 IMHCHEM/IMCYTCHM EA ADD ANTB: CPT | Mod: TC | Performed by: INTERNAL MEDICINE

## 2025-03-11 PROCEDURE — 45382 COLONOSCOPY W/CONTROL BLEED: CPT | Performed by: INTERNAL MEDICINE

## 2025-03-11 PROCEDURE — 45384 COLONOSCOPY W/LESION REMOVAL: CPT | Performed by: INTERNAL MEDICINE

## 2025-03-11 PROCEDURE — G0500 MOD SEDAT ENDO SERVICE >5YRS: HCPCS | Performed by: INTERNAL MEDICINE

## 2025-03-11 PROCEDURE — 88341 IMHCHEM/IMCYTCHM EA ADD ANTB: CPT | Mod: 26 | Performed by: PATHOLOGY

## 2025-03-11 RX ORDER — PROCHLORPERAZINE MALEATE 10 MG
10 TABLET ORAL EVERY 6 HOURS PRN
Status: DISCONTINUED | OUTPATIENT
Start: 2025-03-11 | End: 2025-03-11 | Stop reason: HOSPADM

## 2025-03-11 RX ORDER — DIPHENHYDRAMINE HYDROCHLORIDE 50 MG/ML
25-50 INJECTION, SOLUTION INTRAMUSCULAR; INTRAVENOUS
Status: DISCONTINUED | OUTPATIENT
Start: 2025-03-11 | End: 2025-03-11 | Stop reason: HOSPADM

## 2025-03-11 RX ORDER — EPINEPHRINE 1 MG/ML
0.1 INJECTION, SOLUTION, CONCENTRATE INTRAVENOUS
Status: DISCONTINUED | OUTPATIENT
Start: 2025-03-11 | End: 2025-03-11 | Stop reason: HOSPADM

## 2025-03-11 RX ORDER — FLUMAZENIL 0.1 MG/ML
0.2 INJECTION, SOLUTION INTRAVENOUS
Status: DISCONTINUED | OUTPATIENT
Start: 2025-03-11 | End: 2025-03-11 | Stop reason: HOSPADM

## 2025-03-11 RX ORDER — ATROPINE SULFATE 0.1 MG/ML
1 INJECTION INTRAVENOUS
Status: DISCONTINUED | OUTPATIENT
Start: 2025-03-11 | End: 2025-03-11 | Stop reason: HOSPADM

## 2025-03-11 RX ORDER — ONDANSETRON 2 MG/ML
4 INJECTION INTRAMUSCULAR; INTRAVENOUS EVERY 6 HOURS PRN
Status: DISCONTINUED | OUTPATIENT
Start: 2025-03-11 | End: 2025-03-11 | Stop reason: HOSPADM

## 2025-03-11 RX ORDER — ONDANSETRON 2 MG/ML
4 INJECTION INTRAMUSCULAR; INTRAVENOUS
Status: DISCONTINUED | OUTPATIENT
Start: 2025-03-11 | End: 2025-03-11 | Stop reason: HOSPADM

## 2025-03-11 RX ORDER — FENTANYL CITRATE 50 UG/ML
25-100 INJECTION, SOLUTION INTRAMUSCULAR; INTRAVENOUS EVERY 5 MIN PRN
Status: DISCONTINUED | OUTPATIENT
Start: 2025-03-11 | End: 2025-03-11 | Stop reason: HOSPADM

## 2025-03-11 RX ORDER — ONDANSETRON 4 MG/1
4 TABLET, ORALLY DISINTEGRATING ORAL EVERY 6 HOURS PRN
Status: DISCONTINUED | OUTPATIENT
Start: 2025-03-11 | End: 2025-03-11 | Stop reason: HOSPADM

## 2025-03-11 RX ORDER — LIDOCAINE 40 MG/G
CREAM TOPICAL
Status: DISCONTINUED | OUTPATIENT
Start: 2025-03-11 | End: 2025-03-11 | Stop reason: HOSPADM

## 2025-03-11 RX ORDER — NALOXONE HYDROCHLORIDE 0.4 MG/ML
0.2 INJECTION, SOLUTION INTRAMUSCULAR; INTRAVENOUS; SUBCUTANEOUS
Status: DISCONTINUED | OUTPATIENT
Start: 2025-03-11 | End: 2025-03-11 | Stop reason: HOSPADM

## 2025-03-11 RX ORDER — SIMETHICONE 40MG/0.6ML
133 SUSPENSION, DROPS(FINAL DOSAGE FORM)(ML) ORAL
Status: DISCONTINUED | OUTPATIENT
Start: 2025-03-11 | End: 2025-03-11 | Stop reason: HOSPADM

## 2025-03-11 RX ORDER — NALOXONE HYDROCHLORIDE 0.4 MG/ML
0.4 INJECTION, SOLUTION INTRAMUSCULAR; INTRAVENOUS; SUBCUTANEOUS
Status: DISCONTINUED | OUTPATIENT
Start: 2025-03-11 | End: 2025-03-11 | Stop reason: HOSPADM

## 2025-03-11 RX ADMIN — MIDAZOLAM 1 MG: 1 INJECTION INTRAMUSCULAR; INTRAVENOUS at 07:57

## 2025-03-11 RX ADMIN — MIDAZOLAM 2 MG: 1 INJECTION INTRAMUSCULAR; INTRAVENOUS at 07:52

## 2025-03-11 RX ADMIN — FENTANYL CITRATE 50 MCG: 50 INJECTION, SOLUTION INTRAMUSCULAR; INTRAVENOUS at 07:57

## 2025-03-11 RX ADMIN — FENTANYL CITRATE 100 MCG: 50 INJECTION, SOLUTION INTRAMUSCULAR; INTRAVENOUS at 07:52

## 2025-03-11 RX ADMIN — MIDAZOLAM 1 MG: 1 INJECTION INTRAMUSCULAR; INTRAVENOUS at 08:01

## 2025-03-11 RX ADMIN — FENTANYL CITRATE 50 MCG: 50 INJECTION, SOLUTION INTRAMUSCULAR; INTRAVENOUS at 08:01

## 2025-03-11 ASSESSMENT — ACTIVITIES OF DAILY LIVING (ADL)
ADLS_ACUITY_SCORE: 41
ADLS_ACUITY_SCORE: 41

## 2025-03-11 NOTE — DISCHARGE INSTRUCTIONS
The patient has received a copy of the Provation report the doctor has written and discharge instructions have been discussed with the patient and responsible adult.  All questions were addressed and answered prior to patient discharge.    Clip Placement    This Patient has received a temporary endoscopic clip which can be safely used in a MRI with a static magnetic field of 1/5-Erika and 3-Erika ONLY.  Retention times for this clip vary so if you have an MRI within the next year make sure to mention you had a clip placed on this day to the MRI staff. If you were given the informational card, keep that in in your wallet or purse for 1 year.    Non-clinical testing demonstrated that the clip is MR Conditional according to ASTM -16. A patient with this device can be scanned safely in an MR system under the following conditions.    Static magnetic field of 1.5-Erika and 3-Telsa, only.  Maximum spatial gradient magnetic field of 2,000-gauss/cm (20-T/m)  Maximum MR system reported, whole body averaged specific absorption rate (NIA) of 2-W/kg for 15 minutes of scanning (i.e., per pulse sequence) in the Normal Operating Mode.    Under the scan conditions defined, the clip is expected to produce a maximum temperature rise of 1.9 degrees C after 15 minutes of continuous scanning (i.e., per pulse sequence). In Non-clinical testing, the image artifact caused by the clip extends approximately 30-mm from this implant when imaged using a gradient echo pulse sequence and a 3-Erika RM system.

## 2025-03-11 NOTE — H&P
Pre-Endoscopy History and Physical     Eric Mandel MRN# [id number]   YOB: 1980 Age: 44 year old     Date of Procedure: 3/11/2025  Primary care provider: Bran Bonilla  Type of Endoscopy: Colonoscopy with possible biopsy, possible polypectomy  Reason for Procedure: screen  Type of Anesthesia Anticipated: Conscious Sedation    HPI:    Eric is a 44 year old male who will be undergoing the above procedure.      A history and physical has been performed. The patient's medications and allergies have been reviewed. The risks and benefits of the procedure and the sedation options and risks were discussed with the patient.  All questions were answered and informed consent was obtained.      He denies a personal or family history of anesthesia complications or bleeding disorders.     Patient Active Problem List   Diagnosis    Other eczema - R ankle; uses topical steriod periodically    Lymph node enlargement - L anterior superior cervical chain. Pt reports started after strep episode in  and has been unchanged since.     Localized superficial swelling, mass, or lump - R distal upper arm. ?epidermoid cyst versus less likely other.        Past Medical History:   Diagnosis Date    Depressive disorder Summer 2018    related to wife's cancer treatment    Lymph node enlargement - L anterior superior cervical chain. Pt reports started after strep episode in  and has been unchanged since.  2018    NO ACTIVE PROBLEMS (aka NONE)         Past Surgical History:   Procedure Laterality Date    NO HISTORY OF SURGERY         Social History     Tobacco Use    Smoking status: Former     Current packs/day: 0.00     Types: Cigarettes     Quit date: 2000     Years since quittin.8    Smokeless tobacco: Never    Tobacco comments:     smoked for a couple years in college -- doesn't think he smoked 100 cigarette   Substance Use Topics    Alcohol use: Yes     Comment: Occasional       Family History   Problem  "Relation Age of Onset    Colon Polyps Father     Skin Cancer Father     Familial Adenomatous Polyposis (FAP) Father     Pulmonary fibrosis Father     Coronary Artery Disease Maternal Grandfather         40's, smoker/heavy drinker    Diabetes Paternal Grandmother     Thyroid Disease Paternal Grandmother         non cancerous    Diabetes Paternal Grandfather     Skin Cancer Paternal Aunt     Depression Mother     Anxiety Disorder Mother     Hypertension No family hx of     Hyperlipidemia No family hx of     Cerebrovascular Disease No family hx of     Breast Cancer No family hx of     Colon Cancer No family hx of     Prostate Cancer No family hx of        Prior to Admission medications    Medication Sig Start Date End Date Taking? Authorizing Provider   Multiple Vitamins-Minerals (MULTIVITAMIN ADULT PO)    Yes Reported, Patient   clobetasol (TEMOVATE) 0.05 % external cream Apply topically 2 times daily A thin layer to aa on right ankle x 2-3 weeks. Do not use on face or body folds. 1/30/20   Sada Pérez PA-C       No Known Allergies     REVIEW OF SYSTEMS:   5 point ROS negative except as noted above in HPI, including Gen., Resp., CV, GI &  system review.    PHYSICAL EXAM:   Ht 1.905 m (6' 3\")   Wt 86.6 kg (191 lb)   BMI 23.87 kg/m   Estimated body mass index is 23.87 kg/m  as calculated from the following:    Height as of this encounter: 1.905 m (6' 3\").    Weight as of this encounter: 86.6 kg (191 lb).   GENERAL APPEARANCE: alert, and oriented  MENTAL STATUS: alert  AIRWAY EXAM: Mallampatti Class I (visualization of the soft palate, fauces, uvula, anterior and posterior pillars)  RESP: lungs clear to auscultation - no rales, rhonchi or wheezes  CV: regular rates and rhythm  DIAGNOSTICS:    Not indicated    IMPRESSION   ASA Class 1 - Healthy patient, no medical problems    PLAN:   Plan for Colonoscopy with possible biopsy, possible polypectomy. We discussed the risks, benefits and alternatives and the " patient wished to proceed.    The above has been forwarded to the consulting provider.      Signed Electronically by: Nic Boston MD  March 11, 2025

## 2025-03-13 LAB
PATH REPORT.COMMENTS IMP SPEC: ABNORMAL
PATH REPORT.COMMENTS IMP SPEC: YES
PATH REPORT.FINAL DX SPEC: ABNORMAL
PATH REPORT.GROSS SPEC: ABNORMAL
PATH REPORT.MICROSCOPIC SPEC OTHER STN: ABNORMAL
PATH REPORT.RELEVANT HX SPEC: ABNORMAL
PATHOLOGY SYNOPTIC REPORT: ABNORMAL
PHOTO IMAGE: ABNORMAL

## 2025-03-17 DIAGNOSIS — C80.1 ADENOCARCINOMA IN A POLYP (H): Primary | ICD-10-CM

## 2025-03-20 ENCOUNTER — E-VISIT (OUTPATIENT)
Dept: FAMILY MEDICINE | Facility: CLINIC | Age: 45
End: 2025-03-20
Payer: COMMERCIAL

## 2025-03-20 ENCOUNTER — LAB (OUTPATIENT)
Dept: LAB | Facility: CLINIC | Age: 45
End: 2025-03-20
Attending: COLON & RECTAL SURGERY
Payer: COMMERCIAL

## 2025-03-20 ENCOUNTER — HOSPITAL ENCOUNTER (OUTPATIENT)
Dept: CT IMAGING | Facility: CLINIC | Age: 45
Discharge: HOME | End: 2025-03-20
Attending: COLON & RECTAL SURGERY
Payer: COMMERCIAL

## 2025-03-20 DIAGNOSIS — R93.89 ABNORMAL CHEST CT: Primary | ICD-10-CM

## 2025-03-20 DIAGNOSIS — K76.9 LIVER LESION: ICD-10-CM

## 2025-03-20 DIAGNOSIS — C80.1 ADENOCARCINOMA (H): ICD-10-CM

## 2025-03-20 DIAGNOSIS — E04.1 THYROID NODULE: ICD-10-CM

## 2025-03-20 LAB — CEA SERPL-MCNC: 0.9 NG/ML

## 2025-03-20 PROCEDURE — 71260 CT THORAX DX C+: CPT

## 2025-03-20 PROCEDURE — 74177 CT ABD & PELVIS W/CONTRAST: CPT

## 2025-03-20 PROCEDURE — 250N000011 HC RX IP 250 OP 636: Performed by: COLON & RECTAL SURGERY

## 2025-03-20 PROCEDURE — 250N000009 HC RX 250: Performed by: COLON & RECTAL SURGERY

## 2025-03-20 PROCEDURE — 82378 CARCINOEMBRYONIC ANTIGEN: CPT

## 2025-03-20 PROCEDURE — 36415 COLL VENOUS BLD VENIPUNCTURE: CPT

## 2025-03-20 RX ORDER — IOPAMIDOL 755 MG/ML
500 INJECTION, SOLUTION INTRAVASCULAR ONCE
Status: COMPLETED | OUTPATIENT
Start: 2025-03-20 | End: 2025-03-20

## 2025-03-20 RX ADMIN — IOPAMIDOL 94 ML: 755 INJECTION, SOLUTION INTRAVENOUS at 10:42

## 2025-03-20 RX ADMIN — SODIUM CHLORIDE 65 ML: 9 INJECTION, SOLUTION INTRAVENOUS at 10:42

## 2025-03-24 ENCOUNTER — HOSPITAL ENCOUNTER (OUTPATIENT)
Dept: ULTRASOUND IMAGING | Facility: CLINIC | Age: 45
Discharge: HOME | End: 2025-03-24
Attending: FAMILY MEDICINE
Payer: COMMERCIAL

## 2025-03-24 DIAGNOSIS — K76.9 LIVER LESION: ICD-10-CM

## 2025-03-24 DIAGNOSIS — E04.1 THYROID NODULE: ICD-10-CM

## 2025-03-24 PROCEDURE — 76705 ECHO EXAM OF ABDOMEN: CPT

## 2025-03-24 PROCEDURE — 76536 US EXAM OF HEAD AND NECK: CPT

## 2025-03-25 PROBLEM — D12.6 COLON ADENOMA: Status: ACTIVE | Noted: 2025-03-25

## 2025-03-26 ENCOUNTER — PATIENT OUTREACH (OUTPATIENT)
Dept: GASTROENTEROLOGY | Facility: CLINIC | Age: 45
End: 2025-03-26
Payer: COMMERCIAL

## 2025-03-27 ENCOUNTER — ANCILLARY PROCEDURE (OUTPATIENT)
Dept: ULTRASOUND IMAGING | Facility: CLINIC | Age: 45
End: 2025-03-27
Attending: FAMILY MEDICINE
Payer: COMMERCIAL

## 2025-03-27 ENCOUNTER — TRANSFERRED RECORDS (OUTPATIENT)
Dept: HEALTH INFORMATION MANAGEMENT | Facility: CLINIC | Age: 45
End: 2025-03-27

## 2025-03-27 DIAGNOSIS — E04.1 THYROID NODULE: ICD-10-CM

## 2025-03-27 PROCEDURE — 88173 CYTOPATH EVAL FNA REPORT: CPT | Mod: TC | Performed by: FAMILY MEDICINE

## 2025-03-27 PROCEDURE — 10005 FNA BX W/US GDN 1ST LES: CPT | Performed by: STUDENT IN AN ORGANIZED HEALTH CARE EDUCATION/TRAINING PROGRAM

## 2025-03-27 PROCEDURE — 88173 CYTOPATH EVAL FNA REPORT: CPT | Mod: 26 | Performed by: PATHOLOGY

## 2025-03-27 RX ORDER — LIDOCAINE HYDROCHLORIDE 10 MG/ML
5 INJECTION, SOLUTION INFILTRATION; PERINEURAL ONCE
Status: COMPLETED | OUTPATIENT
Start: 2025-03-27 | End: 2025-03-27

## 2025-03-27 RX ADMIN — LIDOCAINE HYDROCHLORIDE 3 ML: 10 INJECTION, SOLUTION INFILTRATION; PERINEURAL at 10:14

## 2025-03-28 LAB
PATH REPORT.COMMENTS IMP SPEC: NORMAL
PATH REPORT.FINAL DX SPEC: NORMAL
PATH REPORT.GROSS SPEC: NORMAL
PATH REPORT.MICROSCOPIC SPEC OTHER STN: NORMAL

## 2025-04-02 ENCOUNTER — HOSPITAL ENCOUNTER (OUTPATIENT)
Dept: MRI IMAGING | Facility: CLINIC | Age: 45
Discharge: HOME | End: 2025-04-02
Attending: FAMILY MEDICINE
Payer: COMMERCIAL

## 2025-04-02 DIAGNOSIS — K76.9 LIVER LESION: ICD-10-CM

## 2025-04-02 PROCEDURE — 255N000002 HC RX 255 OP 636: Performed by: FAMILY MEDICINE

## 2025-04-02 PROCEDURE — A9585 GADOBUTROL INJECTION: HCPCS | Performed by: FAMILY MEDICINE

## 2025-04-02 PROCEDURE — 74183 MRI ABD W/O CNTR FLWD CNTR: CPT

## 2025-04-02 RX ORDER — GADOBUTROL 604.72 MG/ML
9 INJECTION INTRAVENOUS ONCE
Status: COMPLETED | OUTPATIENT
Start: 2025-04-02 | End: 2025-04-02

## 2025-04-02 RX ADMIN — GADOBUTROL 9 ML: 604.72 INJECTION INTRAVENOUS at 07:02

## 2025-04-07 ENCOUNTER — OFFICE VISIT (OUTPATIENT)
Dept: FAMILY MEDICINE | Facility: CLINIC | Age: 45
End: 2025-04-07
Payer: COMMERCIAL

## 2025-04-07 VITALS
OXYGEN SATURATION: 100 % | HEIGHT: 75 IN | RESPIRATION RATE: 14 BRPM | BODY MASS INDEX: 25.12 KG/M2 | SYSTOLIC BLOOD PRESSURE: 120 MMHG | WEIGHT: 202 LBS | DIASTOLIC BLOOD PRESSURE: 74 MMHG | TEMPERATURE: 98.1 F | HEART RATE: 82 BPM

## 2025-04-07 DIAGNOSIS — Z01.818 PREOPERATIVE EXAMINATION: Primary | ICD-10-CM

## 2025-04-07 DIAGNOSIS — C18.9 ADENOCARCINOMA OF COLON (H): ICD-10-CM

## 2025-04-07 LAB
ERYTHROCYTE [DISTWIDTH] IN BLOOD BY AUTOMATED COUNT: 12.7 % (ref 10–15)
HCT VFR BLD AUTO: 46.9 % (ref 40–53)
HGB BLD-MCNC: 16.3 G/DL (ref 13.3–17.7)
MCH RBC QN AUTO: 32.1 PG (ref 26.5–33)
MCHC RBC AUTO-ENTMCNC: 34.8 G/DL (ref 31.5–36.5)
MCV RBC AUTO: 92 FL (ref 78–100)
PLATELET # BLD AUTO: 246 10E3/UL (ref 150–450)
RBC # BLD AUTO: 5.08 10E6/UL (ref 4.4–5.9)
WBC # BLD AUTO: 7.1 10E3/UL (ref 4–11)

## 2025-04-07 PROCEDURE — 3078F DIAST BP <80 MM HG: CPT | Performed by: FAMILY MEDICINE

## 2025-04-07 PROCEDURE — 85027 COMPLETE CBC AUTOMATED: CPT | Performed by: FAMILY MEDICINE

## 2025-04-07 PROCEDURE — 1126F AMNT PAIN NOTED NONE PRSNT: CPT | Performed by: FAMILY MEDICINE

## 2025-04-07 PROCEDURE — 3074F SYST BP LT 130 MM HG: CPT | Performed by: FAMILY MEDICINE

## 2025-04-07 PROCEDURE — 36415 COLL VENOUS BLD VENIPUNCTURE: CPT | Performed by: FAMILY MEDICINE

## 2025-04-07 PROCEDURE — 99214 OFFICE O/P EST MOD 30 MIN: CPT | Performed by: FAMILY MEDICINE

## 2025-04-07 ASSESSMENT — PATIENT HEALTH QUESTIONNAIRE - PHQ9
SUM OF ALL RESPONSES TO PHQ QUESTIONS 1-9: 0
SUM OF ALL RESPONSES TO PHQ QUESTIONS 1-9: 0
10. IF YOU CHECKED OFF ANY PROBLEMS, HOW DIFFICULT HAVE THESE PROBLEMS MADE IT FOR YOU TO DO YOUR WORK, TAKE CARE OF THINGS AT HOME, OR GET ALONG WITH OTHER PEOPLE: NOT DIFFICULT AT ALL

## 2025-04-07 ASSESSMENT — PAIN SCALES - GENERAL: PAINLEVEL_OUTOF10: NO PAIN (0)

## 2025-04-07 NOTE — H&P (VIEW-ONLY)
Preoperative Evaluation  [address] [place]  [facility] [address]  Phone: [phone]  Primary Provider: Bran Bonilla DO  Pre-op Performing Provider: Bran Bonilla DO  Apr 7, 2025 4/2/2025   Surgical Information   What procedure is being done? robotic assisted sigmoid colectomy    Facility or Hospital where procedure/surgery will be performed: [facility]   Who is doing the procedure / surgery? Senia Fairbanks MD    Date of surgery / procedure: 4/17/2025   Time of surgery / procedure: 10am approx   Where do you plan to recover after surgery? at home with family     Fax number for surgical facility: Note does not need to be faxed, will be available electronically in Epic.    Assessment & Plan     The proposed surgical procedure is considered INTERMEDIATE risk.    Preoperative examination  - CBC with platelets; Future    Adenocarcinoma of colon (H)        - No identified additional risk factors other than previously addressed    Antiplatelet or Anticoagulation Medication Instructions   - We reviewed the medication list and the patient is not on an antiplatelet or anticoagulation medications.    Additional Medication Instructions  We reviewed the medication list and there are no chronic medications that need to be adjusted for this procedure.    Recommendation  Approval given to proceed with proposed procedure, without further diagnostic evaluation.    Kendra Reyes is a 44 year old, presenting for the following:  Pre-Op Exam          4/7/2025    11:11 AM   Additional Questions   Roomed by LAILA LANDERS CMA   Accompanied by SELF     HPI: history of adenocarcinoma of the colon          4/2/2025   Pre-Op Questionnaire   Have you ever had a heart attack or stroke? No   Have you ever had surgery on your heart or blood vessels, such as a stent placement, a coronary artery bypass, or surgery on an artery in your  head, neck, heart, or legs? No   Do you have chest pain with activity? No   Do you have a history of heart failure? No   Do you currently have a cold, bronchitis or symptoms of other infection? No   Do you have a cough, shortness of breath, or wheezing? No   Do you or anyone in your family have previous history of blood clots? No   Do you or does anyone in your family have a serious bleeding problem such as prolonged bleeding following surgeries or cuts? No   Have you ever had problems with anemia or been told to take iron pills? No   Have you had any abnormal blood loss such as black, tarry or bloody stools? No   Have you ever had a blood transfusion? No   Are you willing to have a blood transfusion if it is medically needed before, during, or after your surgery? Yes   Have you or any of your relatives ever had problems with anesthesia? No   Do you have sleep apnea, excessive snoring or daytime drowsiness? No   Do you have any artifical heart valves or other implanted medical devices like a pacemaker, defibrillator, or continuous glucose monitor? No   Do you have artificial joints? No   Are you allergic to latex? No     Health Care Directive  Patient does not have a Health Care Directive: Discussed advance care planning with patient; information given to patient to review.    Preoperative Review of    reviewed - no record of controlled substances prescribed.      Status of Chronic Conditions:  See problem list for active medical problems.  Problems all longstanding and stable, except as noted/documented.  See ROS for pertinent symptoms related to these conditions.    Patient Active Problem List    Diagnosis Date Noted    Colon adenoma 03/25/2025     Priority: Medium    Localized superficial swelling, mass, or lump - R distal upper arm. ?epidermoid cyst versus less likely other. 12/02/2019     Priority: Medium    Other eczema - R ankle; uses topical steriod periodically 09/04/2018     Priority: Medium    Lymph  node enlargement - L anterior superior cervical chain. Pt reports started after strep episode in  and has been unchanged since.  2018     Priority: Medium      Past Medical History:   Diagnosis Date    Depressive disorder Summer 2018    related to wife's cancer treatment    Lymph node enlargement - L anterior superior cervical chain. Pt reports started after strep episode in  and has been unchanged since.  2018    NO ACTIVE PROBLEMS (aka NONE)      Past Surgical History:   Procedure Laterality Date    NO HISTORY OF SURGERY       Current Outpatient Medications   Medication Sig Dispense Refill    Multiple Vitamins-Minerals (MULTIVITAMIN ADULT PO)          No Known Allergies     Social History     Tobacco Use    Smoking status: Former     Current packs/day: 0.00     Types: Cigarettes     Quit date: 2000     Years since quittin.9     Passive exposure: Past    Smokeless tobacco: Never    Tobacco comments:     smoked for a couple years in college -- doesn't think he smoked 100 cigarette   Substance Use Topics    Alcohol use: Yes     Comment: Occasional     Family History   Problem Relation Age of Onset    Colon Polyps Father     Skin Cancer Father     Familial Adenomatous Polyposis (FAP) Father     Pulmonary fibrosis Father     Coronary Artery Disease Maternal Grandfather         40's, smoker/heavy drinker    Diabetes Paternal Grandmother     Thyroid Disease Paternal Grandmother         non cancerous    Diabetes Paternal Grandfather     Skin Cancer Paternal Aunt     Depression Mother     Anxiety Disorder Mother     Hypertension No family hx of     Hyperlipidemia No family hx of     Cerebrovascular Disease No family hx of     Breast Cancer No family hx of     Colon Cancer No family hx of     Prostate Cancer No family hx of      History   Drug Use     Comment: Edible THC         Review of Systems  Constitutional, HEENT, cardiovascular, pulmonary, gi and gu systems are negative, except as  "otherwise noted.    Objective    /74   Pulse 82   Temp 98.1  F (36.7  C) (Tympanic)   Resp 14   Ht 1.905 m (6' 3\")   Wt 91.6 kg (202 lb)   SpO2 100%   BMI 25.25 kg/m     Estimated body mass index is 25.25 kg/m  as calculated from the following:    Height as of this encounter: 1.905 m (6' 3\").    Weight as of this encounter: 91.6 kg (202 lb).    Physical Exam  GENERAL: alert and no distress  EYES: Eyes grossly normal to inspection  HENT: ear canals and TM's normal, nose and mouth without ulcers or lesions  NECK: no adenopathy, no asymmetry, masses, or scars  RESP: lungs clear to auscultation - no rales, rhonchi or wheezes  CV: regular rates and rhythm, normal S1 S2, no S3 or S4, and no murmur, click or rub  MS: no gross musculoskeletal defects noted, no edema  SKIN: no suspicious lesions or rashes  PSYCH: mentation appears normal, affect normal/bright      Recent Labs   Lab Test 09/26/24  0944   HGB 16.7         POTASSIUM 4.2   CR 1.11        Diagnostics  Results for orders placed or performed in visit on 04/07/25   CBC with platelets     Status: Normal   Result Value Ref Range    WBC Count 7.1 4.0 - 11.0 10e3/uL    RBC Count 5.08 4.40 - 5.90 10e6/uL    Hemoglobin 16.3 13.3 - 17.7 g/dL    Hematocrit 46.9 40.0 - 53.0 %    MCV 92 78 - 100 fL    MCH 32.1 26.5 - 33.0 pg    MCHC 34.8 31.5 - 36.5 g/dL    RDW 12.7 10.0 - 15.0 %    Platelet Count 246 150 - 450 10e3/uL       No EKG required, no history of coronary heart disease, significant arrhythmia, peripheral arterial disease or other structural heart disease.    Revised Cardiac Risk Index (RCRI)  The patient has the following serious cardiovascular risks for perioperative complications:   - No serious cardiac risks = 0 points     RCRI Interpretation: 0 points: Class I (very low risk - 0.4% complication rate)         Signed Electronically by: Bran Bonilla DO  A copy of this evaluation report is provided to the requesting physician.  "

## 2025-04-14 RX ORDER — DIPHENHYDRAMINE HCL 25 MG
25 TABLET ORAL
COMMUNITY

## 2025-04-14 NOTE — PROGRESS NOTES
PTA medications updated by Medication Scribe prior to surgery via phone call with patient (last doses completed by Nurse)     Medication history sources: Patient, Surescripts, and H&P  In the past week, patient estimated taking medication this percent of the time: Greater than 90%      Significant changes made to the medication list:  None      Additional medication history information:   Patient will complete antibiotic course: 4/16 PM  Neomycin 500  mg - 2 po Three times day before surgery; at 1pm, 2pm, and 11pm  Metronidazole 500 mg po Three times day before surgery; at 1pm, 2pm, and 11pm    Medication reconciliation completed by provider prior to medication history? No    Time spent in this activity: 15 MINUTES    The information provided in this note is only as accurate as the sources available at the time of update(s)      Prior to Admission medications    Medication Sig Last Dose Taking? Auth Provider Long Term End Date   diphenhydrAMINE (BENADRYL) 25 MG tablet Take 25 mg by mouth nightly as needed for sleep. Bedtime Yes Reported, Patient     melatonin 5 MG tablet Take 1-2 tablets by mouth nightly as needed for sleep. Bedtime Yes Reported, Patient     Multiple Vitamin (MULTIVITAMIN ADULT) TABS Take 1 tablet by mouth daily. Morning Yes Reported, Patient         Medication history completed by: Renetta Mota

## 2025-04-16 ENCOUNTER — ANESTHESIA EVENT (OUTPATIENT)
Dept: SURGERY | Facility: CLINIC | Age: 45
End: 2025-04-16
Payer: COMMERCIAL

## 2025-04-16 ASSESSMENT — ENCOUNTER SYMPTOMS: SEIZURES: 0

## 2025-04-16 NOTE — ANESTHESIA PREPROCEDURE EVALUATION
Anesthesia Pre-Procedure Evaluation    Patient: Eric Mandel   MRN: [id number] : 1980        Procedure : Procedure(s):  robotic assisted sigmoid colectomy          Past Medical History:   Diagnosis Date    Depressive disorder Summer 2018    related to wife's cancer treatment    Lymph node enlargement - L anterior superior cervical chain. Pt reports started after strep episode in  and has been unchanged since.  2018    NO ACTIVE PROBLEMS (aka NONE)       Past Surgical History:   Procedure Laterality Date    NO HISTORY OF SURGERY        No Known Allergies   Social History     Tobacco Use    Smoking status: Former     Current packs/day: 0.00     Types: Cigarettes     Quit date: 2000     Years since quittin.9     Passive exposure: Past    Smokeless tobacco: Never    Tobacco comments:     smoked for a couple years in college -- doesn't think he smoked 100 cigarette   Substance Use Topics    Alcohol use: Yes     Comment: Occasional      Wt Readings from Last 1 Encounters:   25 91.6 kg (202 lb)        Anesthesia Evaluation   Pt has not had prior anesthetic         ROS/MED HX  ENT/Pulmonary:    (-) sleep apnea   Neurologic:    (-) no seizures, no CVA and migraines   Cardiovascular:    (-) hypertension and dyslipidemia   METS/Exercise Tolerance: >4 METS    Hematologic:    (-) history of blood clots and anemia   Musculoskeletal:       GI/Hepatic:    (-) GERD   Renal/Genitourinary:    (-) renal disease   Endo:    (-) Type I DM and Type II DM   Psychiatric/Substance Use:     (+) psychiatric history depression       Infectious Disease:       Malignancy:   (+) Malignancy, History of GI.GI CA  Active status post.      Other:            Physical Exam    Airway        Mallampati: I   TM distance: > 3 FB   Neck ROM: full   Mouth opening: > 3 cm    Respiratory Devices and Support         Dental       (+) Minor Abnormalities - some fillings, tiny chips      Cardiovascular   cardiovascular exam normal   "        Pulmonary   pulmonary exam normal                OUTSIDE LABS:  CBC:   Lab Results   Component Value Date    WBC 7.1 04/07/2025    WBC 6.4 09/26/2024    HGB 16.3 04/07/2025    HGB 16.7 09/26/2024    HCT 46.9 04/07/2025    HCT 48.5 09/26/2024     04/07/2025     09/26/2024     BMP:   Lab Results   Component Value Date     09/26/2024     09/14/2023    POTASSIUM 4.2 09/26/2024    POTASSIUM 4.2 09/14/2023    CHLORIDE 102 09/26/2024    CHLORIDE 101 09/14/2023    CO2 27 09/26/2024    CO2 28 09/14/2023    BUN 17.2 09/26/2024    BUN 18.0 09/14/2023    CR 1.11 09/26/2024    CR 1.08 09/14/2023    GLC 89 09/26/2024    GLC 90 09/14/2023     COAGS: No results found for: \"PTT\", \"INR\", \"FIBR\"  POC: No results found for: \"BGM\", \"HCG\", \"HCGS\"  HEPATIC:   Lab Results   Component Value Date    ALBUMIN 4.3 09/26/2024    PROTTOTAL 6.8 09/26/2024    ALT 19 09/26/2024    AST 22 09/26/2024    ALKPHOS 78 09/26/2024    BILITOTAL 0.5 09/26/2024     OTHER:   Lab Results   Component Value Date    A1C 4.9 06/22/2022    FIONA 9.3 09/26/2024    TSH 0.70 09/14/2023    T4 0.89 06/22/2022    SED 1 06/22/2022       Anesthesia Plan    ASA Status:  3       Anesthesia Type: General.     - Airway: ETT              Consents    Anesthesia Plan(s) and associated risks, benefits, and realistic alternatives discussed. Questions answered and patient/representative(s) expressed understanding.     - Discussed:     - Discussed with:  Patient            Postoperative Care    Pain management: Multi-modal analgesia.   PONV prophylaxis: Ondansetron (or other 5HT-3), Dexamethasone or Solumedrol     Comments:               Doretha Smith MD    Clinically Significant Risk Factors Present on Admission                             # Overweight: Estimated body mass index is 25.25 kg/m  as calculated from the following:    Height as of 4/7/25: 1.905 m (6' 3\").    Weight as of 4/7/25: 91.6 kg (202 lb).                "

## 2025-04-17 ENCOUNTER — ANESTHESIA (OUTPATIENT)
Dept: SURGERY | Facility: CLINIC | Age: 45
End: 2025-04-17
Payer: COMMERCIAL

## 2025-04-17 ENCOUNTER — HOSPITAL ENCOUNTER (INPATIENT)
Facility: CLINIC | Age: 45
End: 2025-04-17
Attending: COLON & RECTAL SURGERY | Admitting: COLON & RECTAL SURGERY
Payer: COMMERCIAL

## 2025-04-17 DIAGNOSIS — C19 CANCER OF RECTOSIGMOID (COLON) (H): Primary | ICD-10-CM

## 2025-04-17 LAB
ABO + RH BLD: NORMAL
ALBUMIN SERPL BCG-MCNC: 4.6 G/DL (ref 3.5–5.2)
ALP SERPL-CCNC: 86 U/L (ref 40–150)
ALT SERPL W P-5'-P-CCNC: 26 U/L (ref 0–70)
ANION GAP SERPL CALCULATED.3IONS-SCNC: 13 MMOL/L (ref 7–15)
AST SERPL W P-5'-P-CCNC: 19 U/L (ref 0–45)
BILIRUB SERPL-MCNC: 0.5 MG/DL
BLD GP AB SCN SERPL QL: NEGATIVE
BUN SERPL-MCNC: 14.5 MG/DL (ref 6–20)
CALCIUM SERPL-MCNC: 9.8 MG/DL (ref 8.8–10.4)
CHLORIDE SERPL-SCNC: 99 MMOL/L (ref 98–107)
CREAT SERPL-MCNC: 1.21 MG/DL (ref 0.67–1.17)
EGFRCR SERPLBLD CKD-EPI 2021: 75 ML/MIN/1.73M2
ERYTHROCYTE [DISTWIDTH] IN BLOOD BY AUTOMATED COUNT: 12.4 % (ref 10–15)
GLUCOSE SERPL-MCNC: 89 MG/DL (ref 70–99)
HCO3 SERPL-SCNC: 25 MMOL/L (ref 22–29)
HCT VFR BLD AUTO: 51.6 % (ref 40–53)
HGB BLD-MCNC: 18.3 G/DL (ref 13.3–17.7)
MCH RBC QN AUTO: 31.7 PG (ref 26.5–33)
MCHC RBC AUTO-ENTMCNC: 35.5 G/DL (ref 31.5–36.5)
MCV RBC AUTO: 89 FL (ref 78–100)
PLATELET # BLD AUTO: 240 10E3/UL (ref 150–450)
POTASSIUM SERPL-SCNC: 4 MMOL/L (ref 3.4–5.3)
PROT SERPL-MCNC: 7.4 G/DL (ref 6.4–8.3)
RBC # BLD AUTO: 5.77 10E6/UL (ref 4.4–5.9)
SODIUM SERPL-SCNC: 137 MMOL/L (ref 135–145)
SPECIMEN EXP DATE BLD: NORMAL
WBC # BLD AUTO: 6.5 10E3/UL (ref 4–11)

## 2025-04-17 PROCEDURE — 88304 TISSUE EXAM BY PATHOLOGIST: CPT | Mod: 26 | Performed by: PATHOLOGY

## 2025-04-17 PROCEDURE — 36415 COLL VENOUS BLD VENIPUNCTURE: CPT | Performed by: COLON & RECTAL SURGERY

## 2025-04-17 PROCEDURE — 4A1BXSH MONITORING OF GASTROINTESTINAL VASCULAR PERFUSION USING INDOCYANINE GREEN DYE, EXTERNAL APPROACH: ICD-10-PCS | Performed by: COLON & RECTAL SURGERY

## 2025-04-17 PROCEDURE — 258N000003 HC RX IP 258 OP 636: Performed by: NURSE ANESTHETIST, CERTIFIED REGISTERED

## 2025-04-17 PROCEDURE — 250N000011 HC RX IP 250 OP 636: Performed by: NURSE ANESTHETIST, CERTIFIED REGISTERED

## 2025-04-17 PROCEDURE — 88304 TISSUE EXAM BY PATHOLOGIST: CPT | Mod: TC | Performed by: COLON & RECTAL SURGERY

## 2025-04-17 PROCEDURE — P9045 ALBUMIN (HUMAN), 5%, 250 ML: HCPCS | Performed by: NURSE ANESTHETIST, CERTIFIED REGISTERED

## 2025-04-17 PROCEDURE — 250N000009 HC RX 250: Performed by: NURSE ANESTHETIST, CERTIFIED REGISTERED

## 2025-04-17 PROCEDURE — 360N000080 HC SURGERY LEVEL 7, PER MIN: Performed by: COLON & RECTAL SURGERY

## 2025-04-17 PROCEDURE — 258N000003 HC RX IP 258 OP 636: Performed by: ANESTHESIOLOGY

## 2025-04-17 PROCEDURE — 86900 BLOOD TYPING SEROLOGIC ABO: CPT | Performed by: COLON & RECTAL SURGERY

## 2025-04-17 PROCEDURE — 85014 HEMATOCRIT: CPT | Performed by: COLON & RECTAL SURGERY

## 2025-04-17 PROCEDURE — 120N000001 HC R&B MED SURG/OB

## 2025-04-17 PROCEDURE — 250N000011 HC RX IP 250 OP 636: Mod: JZ | Performed by: NURSE ANESTHETIST, CERTIFIED REGISTERED

## 2025-04-17 PROCEDURE — 250N000009 HC RX 250: Performed by: COLON & RECTAL SURGERY

## 2025-04-17 PROCEDURE — 84155 ASSAY OF PROTEIN SERUM: CPT | Performed by: COLON & RECTAL SURGERY

## 2025-04-17 PROCEDURE — 250N000013 HC RX MED GY IP 250 OP 250 PS 637: Performed by: COLON & RECTAL SURGERY

## 2025-04-17 PROCEDURE — 0DBP0ZZ EXCISION OF RECTUM, OPEN APPROACH: ICD-10-PCS | Performed by: COLON & RECTAL SURGERY

## 2025-04-17 PROCEDURE — 250N000011 HC RX IP 250 OP 636: Performed by: COLON & RECTAL SURGERY

## 2025-04-17 PROCEDURE — 0DBN0ZZ EXCISION OF SIGMOID COLON, OPEN APPROACH: ICD-10-PCS | Performed by: COLON & RECTAL SURGERY

## 2025-04-17 PROCEDURE — 258N000003 HC RX IP 258 OP 636: Performed by: STUDENT IN AN ORGANIZED HEALTH CARE EDUCATION/TRAINING PROGRAM

## 2025-04-17 PROCEDURE — 258N000003 HC RX IP 258 OP 636: Performed by: COLON & RECTAL SURGERY

## 2025-04-17 PROCEDURE — 370N000017 HC ANESTHESIA TECHNICAL FEE, PER MIN: Performed by: COLON & RECTAL SURGERY

## 2025-04-17 PROCEDURE — 8E0W4CZ ROBOTIC ASSISTED PROCEDURE OF TRUNK REGION, PERCUTANEOUS ENDOSCOPIC APPROACH: ICD-10-PCS | Performed by: COLON & RECTAL SURGERY

## 2025-04-17 PROCEDURE — 250N000011 HC RX IP 250 OP 636: Mod: JZ | Performed by: STUDENT IN AN ORGANIZED HEALTH CARE EDUCATION/TRAINING PROGRAM

## 2025-04-17 PROCEDURE — 258N000001 HC RX 258: Performed by: COLON & RECTAL SURGERY

## 2025-04-17 PROCEDURE — 0DJD8ZZ INSPECTION OF LOWER INTESTINAL TRACT, VIA NATURAL OR ARTIFICIAL OPENING ENDOSCOPIC: ICD-10-PCS | Performed by: COLON & RECTAL SURGERY

## 2025-04-17 PROCEDURE — 710N000009 HC RECOVERY PHASE 1, LEVEL 1, PER MIN: Performed by: COLON & RECTAL SURGERY

## 2025-04-17 PROCEDURE — 0WJG4ZZ INSPECTION OF PERITONEAL CAVITY, PERCUTANEOUS ENDOSCOPIC APPROACH: ICD-10-PCS | Performed by: COLON & RECTAL SURGERY

## 2025-04-17 PROCEDURE — 999N000141 HC STATISTIC PRE-PROCEDURE NURSING ASSESSMENT: Performed by: COLON & RECTAL SURGERY

## 2025-04-17 PROCEDURE — 250N000025 HC SEVOFLURANE, PER MIN: Performed by: COLON & RECTAL SURGERY

## 2025-04-17 PROCEDURE — 88309 TISSUE EXAM BY PATHOLOGIST: CPT | Mod: 26 | Performed by: PATHOLOGY

## 2025-04-17 PROCEDURE — 272N000001 HC OR GENERAL SUPPLY STERILE: Performed by: COLON & RECTAL SURGERY

## 2025-04-17 RX ORDER — DEXAMETHASONE SODIUM PHOSPHATE 4 MG/ML
INJECTION, SOLUTION INTRA-ARTICULAR; INTRALESIONAL; INTRAMUSCULAR; INTRAVENOUS; SOFT TISSUE PRN
Status: DISCONTINUED | OUTPATIENT
Start: 2025-04-17 | End: 2025-04-17

## 2025-04-17 RX ORDER — PROCHLORPERAZINE MALEATE 10 MG
10 TABLET ORAL EVERY 6 HOURS PRN
Status: DISCONTINUED | OUTPATIENT
Start: 2025-04-17 | End: 2025-04-20 | Stop reason: HOSPADM

## 2025-04-17 RX ORDER — INDOCYANINE GREEN AND WATER 25 MG
KIT INJECTION PRN
Status: DISCONTINUED | OUTPATIENT
Start: 2025-04-17 | End: 2025-04-17

## 2025-04-17 RX ORDER — SODIUM CHLORIDE, SODIUM LACTATE, POTASSIUM CHLORIDE, CALCIUM CHLORIDE 600; 310; 30; 20 MG/100ML; MG/100ML; MG/100ML; MG/100ML
INJECTION, SOLUTION INTRAVENOUS CONTINUOUS
Status: DISCONTINUED | OUTPATIENT
Start: 2025-04-17 | End: 2025-04-19

## 2025-04-17 RX ORDER — CEFAZOLIN SODIUM/WATER 2 G/20 ML
2 SYRINGE (ML) INTRAVENOUS
Status: COMPLETED | OUTPATIENT
Start: 2025-04-17 | End: 2025-04-17

## 2025-04-17 RX ORDER — KETOROLAC TROMETHAMINE 15 MG/ML
15 INJECTION, SOLUTION INTRAMUSCULAR; INTRAVENOUS EVERY 6 HOURS PRN
Status: DISCONTINUED | OUTPATIENT
Start: 2025-04-17 | End: 2025-04-20 | Stop reason: HOSPADM

## 2025-04-17 RX ORDER — FENTANYL CITRATE 0.05 MG/ML
50 INJECTION, SOLUTION INTRAMUSCULAR; INTRAVENOUS EVERY 5 MIN PRN
Status: DISCONTINUED | OUTPATIENT
Start: 2025-04-17 | End: 2025-04-17 | Stop reason: HOSPADM

## 2025-04-17 RX ORDER — NALOXONE HYDROCHLORIDE 0.4 MG/ML
0.1 INJECTION, SOLUTION INTRAMUSCULAR; INTRAVENOUS; SUBCUTANEOUS
Status: DISCONTINUED | OUTPATIENT
Start: 2025-04-17 | End: 2025-04-17 | Stop reason: HOSPADM

## 2025-04-17 RX ORDER — LABETALOL HYDROCHLORIDE 5 MG/ML
10 INJECTION, SOLUTION INTRAVENOUS
Status: DISCONTINUED | OUTPATIENT
Start: 2025-04-17 | End: 2025-04-17 | Stop reason: HOSPADM

## 2025-04-17 RX ORDER — NALOXONE HYDROCHLORIDE 0.4 MG/ML
0.4 INJECTION, SOLUTION INTRAMUSCULAR; INTRAVENOUS; SUBCUTANEOUS
Status: DISCONTINUED | OUTPATIENT
Start: 2025-04-17 | End: 2025-04-20 | Stop reason: HOSPADM

## 2025-04-17 RX ORDER — ONDANSETRON 2 MG/ML
4 INJECTION INTRAMUSCULAR; INTRAVENOUS EVERY 30 MIN PRN
Status: DISCONTINUED | OUTPATIENT
Start: 2025-04-17 | End: 2025-04-17 | Stop reason: HOSPADM

## 2025-04-17 RX ORDER — DIPHENHYDRAMINE HCL 25 MG
25 TABLET ORAL
Status: DISCONTINUED | OUTPATIENT
Start: 2025-04-17 | End: 2025-04-17

## 2025-04-17 RX ORDER — BUPIVACAINE HYDROCHLORIDE AND EPINEPHRINE 5; 5 MG/ML; UG/ML
INJECTION, SOLUTION PERINEURAL PRN
Status: DISCONTINUED | OUTPATIENT
Start: 2025-04-17 | End: 2025-04-17 | Stop reason: HOSPADM

## 2025-04-17 RX ORDER — FENTANYL CITRATE 0.05 MG/ML
25 INJECTION, SOLUTION INTRAMUSCULAR; INTRAVENOUS EVERY 5 MIN PRN
Status: DISCONTINUED | OUTPATIENT
Start: 2025-04-17 | End: 2025-04-17 | Stop reason: HOSPADM

## 2025-04-17 RX ORDER — FENTANYL CITRATE 50 UG/ML
INJECTION, SOLUTION INTRAMUSCULAR; INTRAVENOUS PRN
Status: DISCONTINUED | OUTPATIENT
Start: 2025-04-17 | End: 2025-04-17

## 2025-04-17 RX ORDER — METRONIDAZOLE 500 MG/100ML
500 INJECTION, SOLUTION INTRAVENOUS
Status: COMPLETED | OUTPATIENT
Start: 2025-04-17 | End: 2025-04-17

## 2025-04-17 RX ORDER — DEXAMETHASONE SODIUM PHOSPHATE 4 MG/ML
4 INJECTION, SOLUTION INTRA-ARTICULAR; INTRALESIONAL; INTRAMUSCULAR; INTRAVENOUS; SOFT TISSUE
Status: DISCONTINUED | OUTPATIENT
Start: 2025-04-17 | End: 2025-04-17 | Stop reason: HOSPADM

## 2025-04-17 RX ORDER — SODIUM CHLORIDE, SODIUM LACTATE, POTASSIUM CHLORIDE, CALCIUM CHLORIDE 600; 310; 30; 20 MG/100ML; MG/100ML; MG/100ML; MG/100ML
INJECTION, SOLUTION INTRAVENOUS CONTINUOUS PRN
Status: DISCONTINUED | OUTPATIENT
Start: 2025-04-17 | End: 2025-04-17

## 2025-04-17 RX ORDER — LABETALOL 20 MG/4 ML (5 MG/ML) INTRAVENOUS SYRINGE
PRN
Status: DISCONTINUED | OUTPATIENT
Start: 2025-04-17 | End: 2025-04-17

## 2025-04-17 RX ORDER — SODIUM CHLORIDE, SODIUM LACTATE, POTASSIUM CHLORIDE, CALCIUM CHLORIDE 600; 310; 30; 20 MG/100ML; MG/100ML; MG/100ML; MG/100ML
INJECTION, SOLUTION INTRAVENOUS CONTINUOUS
Status: DISCONTINUED | OUTPATIENT
Start: 2025-04-17 | End: 2025-04-17 | Stop reason: HOSPADM

## 2025-04-17 RX ORDER — OXYCODONE HYDROCHLORIDE 5 MG/1
10 TABLET ORAL EVERY 4 HOURS PRN
Status: DISCONTINUED | OUTPATIENT
Start: 2025-04-17 | End: 2025-04-20 | Stop reason: HOSPADM

## 2025-04-17 RX ORDER — ONDANSETRON 2 MG/ML
4 INJECTION INTRAMUSCULAR; INTRAVENOUS EVERY 6 HOURS PRN
Status: DISCONTINUED | OUTPATIENT
Start: 2025-04-17 | End: 2025-04-20 | Stop reason: HOSPADM

## 2025-04-17 RX ORDER — DIPHENHYDRAMINE HYDROCHLORIDE 50 MG/ML
25 INJECTION, SOLUTION INTRAMUSCULAR; INTRAVENOUS EVERY 6 HOURS PRN
Status: DISCONTINUED | OUTPATIENT
Start: 2025-04-17 | End: 2025-04-20 | Stop reason: HOSPADM

## 2025-04-17 RX ORDER — ONDANSETRON 2 MG/ML
4 INJECTION INTRAMUSCULAR; INTRAVENOUS ONCE
Status: COMPLETED | OUTPATIENT
Start: 2025-04-17 | End: 2025-04-17

## 2025-04-17 RX ORDER — ONDANSETRON 4 MG/1
4 TABLET, ORALLY DISINTEGRATING ORAL EVERY 6 HOURS PRN
Status: DISCONTINUED | OUTPATIENT
Start: 2025-04-17 | End: 2025-04-20 | Stop reason: HOSPADM

## 2025-04-17 RX ORDER — BUPIVACAINE HYDROCHLORIDE 5 MG/ML
INJECTION, SOLUTION PERINEURAL PRN
Status: DISCONTINUED | OUTPATIENT
Start: 2025-04-17 | End: 2025-04-17 | Stop reason: HOSPADM

## 2025-04-17 RX ORDER — HEPARIN SODIUM 5000 [USP'U]/.5ML
5000 INJECTION, SOLUTION INTRAVENOUS; SUBCUTANEOUS EVERY 8 HOURS
Status: DISCONTINUED | OUTPATIENT
Start: 2025-04-18 | End: 2025-04-20

## 2025-04-17 RX ORDER — SIMETHICONE 80 MG
80 TABLET,CHEWABLE ORAL 4 TIMES DAILY PRN
Status: DISCONTINUED | OUTPATIENT
Start: 2025-04-17 | End: 2025-04-20 | Stop reason: HOSPADM

## 2025-04-17 RX ORDER — MAGNESIUM HYDROXIDE 1200 MG/15ML
LIQUID ORAL PRN
Status: DISCONTINUED | OUTPATIENT
Start: 2025-04-17 | End: 2025-04-17 | Stop reason: HOSPADM

## 2025-04-17 RX ORDER — HEPARIN SODIUM 5000 [USP'U]/.5ML
5000 INJECTION, SOLUTION INTRAVENOUS; SUBCUTANEOUS
Status: COMPLETED | OUTPATIENT
Start: 2025-04-17 | End: 2025-04-17

## 2025-04-17 RX ORDER — NALOXONE HYDROCHLORIDE 0.4 MG/ML
0.2 INJECTION, SOLUTION INTRAMUSCULAR; INTRAVENOUS; SUBCUTANEOUS
Status: DISCONTINUED | OUTPATIENT
Start: 2025-04-17 | End: 2025-04-20 | Stop reason: HOSPADM

## 2025-04-17 RX ORDER — ONDANSETRON 2 MG/ML
INJECTION INTRAMUSCULAR; INTRAVENOUS PRN
Status: DISCONTINUED | OUTPATIENT
Start: 2025-04-17 | End: 2025-04-17

## 2025-04-17 RX ORDER — OXYCODONE HYDROCHLORIDE 5 MG/1
5 TABLET ORAL EVERY 4 HOURS PRN
Status: DISCONTINUED | OUTPATIENT
Start: 2025-04-17 | End: 2025-04-20 | Stop reason: HOSPADM

## 2025-04-17 RX ORDER — ACETAMINOPHEN 325 MG/1
975 TABLET ORAL ONCE
Status: COMPLETED | OUTPATIENT
Start: 2025-04-17 | End: 2025-04-17

## 2025-04-17 RX ORDER — HYDROMORPHONE HCL IN WATER/PF 6 MG/30 ML
0.2 PATIENT CONTROLLED ANALGESIA SYRINGE INTRAVENOUS EVERY 5 MIN PRN
Status: DISCONTINUED | OUTPATIENT
Start: 2025-04-17 | End: 2025-04-17 | Stop reason: HOSPADM

## 2025-04-17 RX ORDER — HYDROMORPHONE HCL IN WATER/PF 6 MG/30 ML
0.2 PATIENT CONTROLLED ANALGESIA SYRINGE INTRAVENOUS
Status: DISCONTINUED | OUTPATIENT
Start: 2025-04-17 | End: 2025-04-20 | Stop reason: HOSPADM

## 2025-04-17 RX ORDER — KETAMINE HYDROCHLORIDE 10 MG/ML
INJECTION INTRAMUSCULAR; INTRAVENOUS PRN
Status: DISCONTINUED | OUTPATIENT
Start: 2025-04-17 | End: 2025-04-17

## 2025-04-17 RX ORDER — HYDROMORPHONE HCL IN WATER/PF 6 MG/30 ML
0.4 PATIENT CONTROLLED ANALGESIA SYRINGE INTRAVENOUS
Status: DISCONTINUED | OUTPATIENT
Start: 2025-04-17 | End: 2025-04-20 | Stop reason: HOSPADM

## 2025-04-17 RX ORDER — LIDOCAINE 40 MG/G
CREAM TOPICAL
Status: DISCONTINUED | OUTPATIENT
Start: 2025-04-17 | End: 2025-04-20 | Stop reason: HOSPADM

## 2025-04-17 RX ORDER — PROPOFOL 10 MG/ML
INJECTION, EMULSION INTRAVENOUS PRN
Status: DISCONTINUED | OUTPATIENT
Start: 2025-04-17 | End: 2025-04-17

## 2025-04-17 RX ORDER — ONDANSETRON 4 MG/1
4 TABLET, ORALLY DISINTEGRATING ORAL EVERY 30 MIN PRN
Status: DISCONTINUED | OUTPATIENT
Start: 2025-04-17 | End: 2025-04-17 | Stop reason: HOSPADM

## 2025-04-17 RX ORDER — HYDROMORPHONE HCL IN WATER/PF 6 MG/30 ML
0.4 PATIENT CONTROLLED ANALGESIA SYRINGE INTRAVENOUS EVERY 5 MIN PRN
Status: DISCONTINUED | OUTPATIENT
Start: 2025-04-17 | End: 2025-04-17 | Stop reason: HOSPADM

## 2025-04-17 RX ORDER — LIDOCAINE HYDROCHLORIDE 20 MG/ML
INJECTION, SOLUTION INFILTRATION; PERINEURAL PRN
Status: DISCONTINUED | OUTPATIENT
Start: 2025-04-17 | End: 2025-04-17

## 2025-04-17 RX ORDER — ACETAMINOPHEN 500 MG
1000 TABLET ORAL EVERY 6 HOURS
Status: DISCONTINUED | OUTPATIENT
Start: 2025-04-17 | End: 2025-04-20 | Stop reason: HOSPADM

## 2025-04-17 RX ORDER — CEFAZOLIN SODIUM/WATER 2 G/20 ML
2 SYRINGE (ML) INTRAVENOUS SEE ADMIN INSTRUCTIONS
Status: DISCONTINUED | OUTPATIENT
Start: 2025-04-17 | End: 2025-04-17 | Stop reason: HOSPADM

## 2025-04-17 RX ADMIN — INDOCYANINE GREEN AND WATER 10 MG: KIT at 15:32

## 2025-04-17 RX ADMIN — LIDOCAINE HYDROCHLORIDE 60 MG: 20 INJECTION, SOLUTION INFILTRATION; PERINEURAL at 12:45

## 2025-04-17 RX ADMIN — ROCURONIUM BROMIDE 20 MG: 50 INJECTION, SOLUTION INTRAVENOUS at 14:01

## 2025-04-17 RX ADMIN — SODIUM CHLORIDE, SODIUM LACTATE, POTASSIUM CHLORIDE, AND CALCIUM CHLORIDE: .6; .31; .03; .02 INJECTION, SOLUTION INTRAVENOUS at 12:35

## 2025-04-17 RX ADMIN — HYDROMORPHONE HYDROCHLORIDE 0.4 MG: 0.2 INJECTION, SOLUTION INTRAMUSCULAR; INTRAVENOUS; SUBCUTANEOUS at 17:44

## 2025-04-17 RX ADMIN — HYDROMORPHONE HYDROCHLORIDE 0.2 MG: 0.2 INJECTION, SOLUTION INTRAMUSCULAR; INTRAVENOUS; SUBCUTANEOUS at 20:25

## 2025-04-17 RX ADMIN — MIDAZOLAM 2 MG: 1 INJECTION INTRAMUSCULAR; INTRAVENOUS at 12:42

## 2025-04-17 RX ADMIN — FENTANYL CITRATE 100 MCG: 50 INJECTION INTRAMUSCULAR; INTRAVENOUS at 12:45

## 2025-04-17 RX ADMIN — ACETAMINOPHEN 1000 MG: 500 TABLET ORAL at 20:25

## 2025-04-17 RX ADMIN — ONDANSETRON 4 MG: 2 INJECTION INTRAMUSCULAR; INTRAVENOUS at 14:38

## 2025-04-17 RX ADMIN — METRONIDAZOLE 500 MG: 500 INJECTION, SOLUTION INTRAVENOUS at 11:50

## 2025-04-17 RX ADMIN — SODIUM CHLORIDE, SODIUM LACTATE, POTASSIUM CHLORIDE, AND CALCIUM CHLORIDE: .6; .31; .03; .02 INJECTION, SOLUTION INTRAVENOUS at 14:48

## 2025-04-17 RX ADMIN — FENTANYL CITRATE 50 MCG: 50 INJECTION, SOLUTION INTRAMUSCULAR; INTRAVENOUS at 17:32

## 2025-04-17 RX ADMIN — LABETALOL 20 MG/4 ML (5 MG/ML) INTRAVENOUS SYRINGE 10 MG: at 13:17

## 2025-04-17 RX ADMIN — Medication 30 MG: at 15:45

## 2025-04-17 RX ADMIN — Medication 2 G: at 16:48

## 2025-04-17 RX ADMIN — ACETAMINOPHEN 975 MG: 325 TABLET ORAL at 11:27

## 2025-04-17 RX ADMIN — PROPOFOL 200 MG: 10 INJECTION, EMULSION INTRAVENOUS at 12:45

## 2025-04-17 RX ADMIN — FENTANYL CITRATE 50 MCG: 50 INJECTION, SOLUTION INTRAMUSCULAR; INTRAVENOUS at 17:24

## 2025-04-17 RX ADMIN — PHENYLEPHRINE HYDROCHLORIDE 100 MCG: 10 INJECTION INTRAVENOUS at 15:58

## 2025-04-17 RX ADMIN — DEXAMETHASONE SODIUM PHOSPHATE 4 MG: 4 INJECTION, SOLUTION INTRA-ARTICULAR; INTRALESIONAL; INTRAMUSCULAR; INTRAVENOUS; SOFT TISSUE at 12:48

## 2025-04-17 RX ADMIN — KETOROLAC TROMETHAMINE 15 MG: 15 INJECTION, SOLUTION INTRAMUSCULAR; INTRAVENOUS at 21:45

## 2025-04-17 RX ADMIN — Medication 2 G: at 12:48

## 2025-04-17 RX ADMIN — ROCURONIUM BROMIDE 30 MG: 50 INJECTION, SOLUTION INTRAVENOUS at 14:29

## 2025-04-17 RX ADMIN — SODIUM CHLORIDE, SODIUM LACTATE, POTASSIUM CHLORIDE, AND CALCIUM CHLORIDE: .6; .31; .03; .02 INJECTION, SOLUTION INTRAVENOUS at 18:16

## 2025-04-17 RX ADMIN — HYDROMORPHONE HYDROCHLORIDE 0.5 MG: 1 INJECTION, SOLUTION INTRAMUSCULAR; INTRAVENOUS; SUBCUTANEOUS at 16:22

## 2025-04-17 RX ADMIN — HYDROMORPHONE HYDROCHLORIDE 0.4 MG: 0.2 INJECTION, SOLUTION INTRAMUSCULAR; INTRAVENOUS; SUBCUTANEOUS at 18:25

## 2025-04-17 RX ADMIN — ROCURONIUM BROMIDE 20 MG: 50 INJECTION, SOLUTION INTRAVENOUS at 15:46

## 2025-04-17 RX ADMIN — SODIUM CHLORIDE, SODIUM LACTATE, POTASSIUM CHLORIDE, AND CALCIUM CHLORIDE: .6; .31; .03; .02 INJECTION, SOLUTION INTRAVENOUS at 15:54

## 2025-04-17 RX ADMIN — ROCURONIUM BROMIDE 20 MG: 50 INJECTION, SOLUTION INTRAVENOUS at 13:19

## 2025-04-17 RX ADMIN — OXYCODONE HYDROCHLORIDE 5 MG: 5 TABLET ORAL at 21:44

## 2025-04-17 RX ADMIN — Medication 200 MG: at 16:57

## 2025-04-17 RX ADMIN — ALBUMIN (HUMAN): 12.5 SOLUTION INTRAVENOUS at 15:12

## 2025-04-17 RX ADMIN — PHENYLEPHRINE HYDROCHLORIDE 0.5 MCG/KG/MIN: 10 INJECTION INTRAVENOUS at 16:11

## 2025-04-17 RX ADMIN — ONDANSETRON 4 MG: 2 INJECTION, SOLUTION INTRAMUSCULAR; INTRAVENOUS at 11:57

## 2025-04-17 RX ADMIN — HYDROMORPHONE HYDROCHLORIDE 0.5 MG: 1 INJECTION, SOLUTION INTRAMUSCULAR; INTRAVENOUS; SUBCUTANEOUS at 13:05

## 2025-04-17 RX ADMIN — ROCURONIUM BROMIDE 10 MG: 50 INJECTION, SOLUTION INTRAVENOUS at 13:51

## 2025-04-17 RX ADMIN — HYDROMORPHONE HYDROCHLORIDE 0.4 MG: 0.2 INJECTION, SOLUTION INTRAMUSCULAR; INTRAVENOUS; SUBCUTANEOUS at 18:37

## 2025-04-17 RX ADMIN — HEPARIN SODIUM 5000 UNITS: 5000 INJECTION, SOLUTION INTRAVENOUS; SUBCUTANEOUS at 12:28

## 2025-04-17 RX ADMIN — SODIUM CHLORIDE, SODIUM LACTATE, POTASSIUM CHLORIDE, AND CALCIUM CHLORIDE: .6; .31; .03; .02 INJECTION, SOLUTION INTRAVENOUS at 20:15

## 2025-04-17 RX ADMIN — SODIUM CHLORIDE, SODIUM LACTATE, POTASSIUM CHLORIDE, AND CALCIUM CHLORIDE: .6; .31; .03; .02 INJECTION, SOLUTION INTRAVENOUS at 11:45

## 2025-04-17 RX ADMIN — ROCURONIUM BROMIDE 50 MG: 50 INJECTION, SOLUTION INTRAVENOUS at 12:45

## 2025-04-17 RX ADMIN — Medication 10 MG: at 16:37

## 2025-04-17 RX ADMIN — HYDROMORPHONE HYDROCHLORIDE 0.4 MG: 0.2 INJECTION, SOLUTION INTRAMUSCULAR; INTRAVENOUS; SUBCUTANEOUS at 18:15

## 2025-04-17 ASSESSMENT — ACTIVITIES OF DAILY LIVING (ADL)
ADLS_ACUITY_SCORE: 18
ADLS_ACUITY_SCORE: 20
ADLS_ACUITY_SCORE: 18
ADLS_ACUITY_SCORE: 20
ADLS_ACUITY_SCORE: 41
ADLS_ACUITY_SCORE: 20
ADLS_ACUITY_SCORE: 18
ADLS_ACUITY_SCORE: 20
ADLS_ACUITY_SCORE: 30
ADLS_ACUITY_SCORE: 18
ADLS_ACUITY_SCORE: 20

## 2025-04-17 NOTE — ANESTHESIA CARE TRANSFER NOTE
Patient: Eric Mandel    Procedure: Procedure(s):  Robotic converted to open low anterior resection       Diagnosis: Adenocarcinoma (H) [C80.1]  Colon cancer (H) [C18.9]  Diagnosis Additional Information: No value filed.    Anesthesia Type:   General     Note:    Oropharynx: oropharynx clear of all foreign objects and spontaneously breathing  Level of Consciousness: awake  Oxygen Supplementation: face mask  Level of Supplemental Oxygen (L/min / FiO2): 6  Independent Airway: airway patency satisfactory and stable  Dentition: dentition unchanged  Vital Signs Stable: post-procedure vital signs reviewed and stable  Report to RN Given: handoff report given  Patient transferred to: PACU  Comments:   Neuromuscular blockade reversed with sugammadex to TOF ratio equal or greater than 90% (measured by TwitchView at the adductor pollicis), spontaneous respirations, adequate tidal volumes, extubated atraumatically, extubated with suction, airway patent after extubation.  Oxygen via room air to PACU. Oxygen tubing connected to wall O2 in PACU, SpO2, NiBP, and EKG monitors and alarms on and functioning, report on patient's clinical status given to PACU RN, RN questions answered.             Handoff Report: Identifed the Patient, Identified the Reponsible Provider, Reviewed the pertinent medical history, Discussed the surgical course, Reviewed Intra-OP anesthesia mangement and issues during anesthesia, Set expectations for post-procedure period and Allowed opportunity for questions and acknowledgement of understanding  Vitals:  Vitals Value Taken Time   BP     Temp 36.7  C (98.06  F) 04/17/25 1706   Pulse 92 04/17/25 1706   Resp 13 04/17/25 1706   SpO2 99 % 04/17/25 1706   Vitals shown include unfiled device data.    Electronically Signed By: STELLA Haq CRNA  April 17, 2025  5:07 PM

## 2025-04-17 NOTE — ANESTHESIA PROCEDURE NOTES
Airway       Patient location during procedure: OR       Procedure Start/Stop Times: 4/17/2025 12:48 PM  Staff -        Anesthesiologist:  Doretha Smith MD       CRNA: Svetlana Flores APRN CRNA       Performed By: CRNA  Consent for Airway        Urgency: elective  Indications and Patient Condition       Indications for airway management: rosie-procedural       Induction type:intravenous       Mask difficulty assessment: 1 - vent by mask    Final Airway Details       Final airway type: endotracheal airway       Successful airway: ETT - single  Endotracheal Airway Details        ETT size (mm): 8.0       Cuffed: yes       Successful intubation technique: direct laryngoscopy       DL Blade Type: MAC 4       Grade View of Cords: 1       Adjucts: stylet       Position: Right       Measured from: gums/teeth       Secured at (cm): 23       Bite block used: None    Post intubation assessment        Placement verified by: capnometry, equal breath sounds and chest rise        Number of attempts at approach: 1       Number of other approaches attempted: 0       Secured with: tape       Ease of procedure: easy       Dentition: Intact and Unchanged    Medication(s) Administered   Medication Administration Time: 4/17/2025 12:48 PM

## 2025-04-17 NOTE — OP NOTE
PREOPERATIVE DIAGNOSIS: Locally advanced rectal cancer status post total neoadjuvant therapy     POSTOPERATIVE DIAGNOSIS: Same     OPERATION PERFORMED:   1. Robotic converted to open low anterior resection with colorectal anastomosis at 8 cm for curative intent   2. Rigid proctoscopy with anastomotic leak testing   3. Fluorescence angiography  4. Flexible sigmoidoscopy     Surgeon:  Senia Fairbanks MD  Assistant: ZULLY Kahn MD, Colon and Rectal Surgery Fellow     ANESTHESIA: General.     INDICATION: Eric Mandel is a 44 year old man who presented for a screening colonoscopy.  He was found to have an approximately 12 mm polyp in the rectosigmoid colon.  This was removed with a hot snare, however demonstrated underlying moderately differentiated adenocarcinoma with a positive resection margin.  He underwent a staging evaluation which demonstrated no evidence of metastatic spread of disease.  Given the positive margin and location within the rectosigmoid colon he was recommended to undergo definitive operative resection with a robotic assisted sigmoid colectomy versus an anterior resection.  The risk and benefits of this procedure have been discussed, and he has agreed to proceed.     OPERATIVE FINDINGS: There was no evidence of intra-abdominal spread of disease.  Tattoo was identified at the level of the anterior reflection.  Flexible sigmoidoscopy was performed to locate the tattoo as this was in the posterior position.  A scar was identified at the level of the tattoo consistent with the patient's previous polypectomy.  A lipoma was identified within the sigmoid colon.  Colorectal anastomosis was located at 8 cm from the anal verge.     PROCEDURE IN DETAIL: After informed consent was obtained, the patient was brought to the operating room and placed in the supine position on the operating room table. Sequential compression devices were placed on the lower extremities prior to  induction and general anesthesia was induced without difficulty. He was placed into a well-padded dorsal lithotomy position. IV antibiotics and subcutaneous heparin were administered. A Pigasi pad was used on the operating room table to prevent him from sliding off the table in steep Trendelenburg position.     The abdomen was sterilely prepped and draped in standard fashion.  The abdomen was entered in the left upper quadrant using a Veress technique.  After positive saline drop test, the abdomen was insufflated with CO2 gas to a pressure of 15 mmHg. An 8 mm robotic port was then placed just superior and lateral to the umbilicus. The abdomen was then explored and the operative findings are noted above.  3 additional robotic ports were placed in a straight line across the abdomen, 12 mm in the right mid-abdomen and two 8 mm trochars in the left abdomen.  A 5 mm air seal was then placed superior to the robotic trochars in between the right and supraumbilical port sites.     The patient was then placed in steep Trendelenburg position and the small bowel was gently swept out of the pelvis using a blunt grasper.  We then docked the robot using a tip up grasper in arm 1, a fenestrated bipolar in arm 2, the camera in arm 3 and monopolar scissors in arm 4.  The sigmoid colon was adherent to the lateral sidewall, so we started with a lateral to medial dissection.  For unclear reasons, there were dense adhesions to the sigmoid fossa, the peritoneum overlying the iliac artery and the left ureter.  We mobilized the descending colon along the white line of Toldt up to the level of the splenic flexure.  We had difficulty fully mobilizing the lateral adhesions at the level of the pelvic brim due to their close proximity to the iliac vessels and the ureter.  We then attempted a medial to lateral approach.  We pulled the redundant sigmoid colon out of the pelvis, and placed the sigmoid colon on tension towards the anterior  abdominal wall.  We opened the peritoneum to enter into the presacral space and the space between the colonic mesentery and the retroperitoneum, however there were significant adhesions, and multiple blood vessels within the presacral space.  With this dissection, we were unable to clearly identify the presacral plane due to the difficulty getting adequate mobilization and exposure due to his redundant sigmoid colon.  We did open the mesentery along bilateral sides of the rectum, as well as at the anterior reflection.  The tattoo was identified at the level of the anterior flexion.  Despite attempting to access the presacral space as well as the space between the colonic mesentery and the retroperitoneum from multiple different approaches, we were unable to identify a safe plane that we felt comfortable proceeding with.  Given the difficulty of this dissection, as well as the abnormal adhesions, we elected to perform the remainder of the dissection through an open incision.    We then made a lower midline incision extending from just above the pubic symphysis to below the umbilicus.  Dissection was carried down to the level of the fascia.  The fascia was then opened sharply, and the peritoneum entered.  An Carlos wound retractor was placed in the wound which provided adequate exposure.  We then placed the sigmoid colon on stretch which was significantly redundant.  Given the large amount of colon, we elected to select our proximal transection point within the mid sigmoid colon.  A window was made in the mesentery at the level of the bowel wall, and the bowel was divided with a contour stapler, green load.  The mesentery was then divided in a radial fashion towards the pelvis.  We were then able to place our colon conduit in the left upper quadrant.  With this additional visualization, we then continued our mobilization of the mesentery off of the scar tissue in the left pelvic sidewall, and carefully mobilized the  colonic mesentery and the mesorectum from the sidewall structures.  We were then able to enter into the presacral plane.  We did not divide the KELLIE pedicle, but rather the superior rectal artery given the distal location of the tattoo.  We then continued our dissection into the pelvis, and opened the remaining bilateral peritoneal attachments along the upper rectum.  We then continued our dissection on the anterior rectum as well as in the posterior presacral space.  We continued this circumferentially almost to the level of the pelvic floor.  We did have some difficulty identifying where the tattoo was that we thought we had seen at the level of the anterior reflection, so at this point we performed a flexible sigmoidoscopy intraoperatively.  We are able to clearly identify a tattoo in the posterior rectum with a scar or possible residual mass just proximal to the tattoo.  We then placed a tattoo in the anterior wall of the rectum so we could identify this easier intraoperatively.    We then made a window in the mesial rectum just distal to the tattoo and the residual mass.  We used a contour stapler to divide the rectum.  The remaining mesorectum was divided with the LigaSure device.  I then opened the specimen on the back table identifying that we were distal to both the residual mass as well as the tattoo.  The specimen was then handed off for permanent pathology.  Hemostasis was ensured in the pelvis.      The sigmoid colon conduit was then identified and viability confirmed by injecting 4 mL of ICG intravenously and using the firefly function on the robotic camera to confirmed good perfusion.  The staple line was removed and a pursestring suture was created using a 2-0 double-armed PDS suture.  This was tightened around the anvil of the 29 mm EEA stapler. Any additional fat caught in the pursestring suture was trimmed off and the colon was pink and viable with excellent blood supply.  A second pursestring was  made with the same suture and this was tied snugly around the anvil.    I then went below and performed a leak test of the rectal stump.  This appeared to be pneumostatic.  I then inserted the stapling instrument into the anal canal, advancing the spike just posterior to the staple line of the distal rectal cuff. The 2 ends of the stapler were connected, the orientation of the colon conduit confirmed and the stapler tightened and fired. Two anastomotic donuts were completely intact. The anastomosis was tested by insufflating from below through the proctoscope with the anastomosis under saline. There was no evidence of leak despite multiple insufflations. The anastomosis was under no tension and there was excellent blood supply on both sides of the anastomosis. Excess air was evacuated from below through the proctoscope.      The abdomen was irrigated with a copious amount of saline solution. Hemostasis was evident.  The fascia at the 12 mm port site in the lower abdomen was then closed with a figure-of-eight #0 Vicryl suture.  The lower midline incision was then closed in layers.  The peritoneum was closed with a running 2-0 Vicryl suture.  The fascia was closed with 2 #0 looped PDS sutures from either end. All wounds were irrigated with a copious amount of saline solution and the skin was closed with 4-0 Monocryl sutures the skin dressed with skin glue.    This operation cannot have been safely performed without compromising the technical results of the procedure without a skilled surgical assistant.  The surgical assistant was necessary for positioning, intraoperative retraction, as well as management of the robotic instruments at bedside.  They were also necessary for creation of the colorectal anastomosis and meticulous wound closure. They ensured that the procedure could be completed in a technically safe and efficient manner.    Colon Resection  Operation performed with curative intent Yes   Tumor Location  Rectum, NOS   Extent of colon and vascular resection Other low anterior resection with division of the superior rectal artery       The patient tolerated this procedure well without complications. The patient was extubated in the operating room and brought to the recovery room in stable condition.      EBL: 50 ml  SPECIMEN: sigmoid colon and rectum, anastomotic rings  COMPLICATIONS: none

## 2025-04-17 NOTE — INTERVAL H&P NOTE
I have reviewed the H & P that is linked to this encounter and examined the patient.  There are no significant changes.

## 2025-04-17 NOTE — BRIEF OP NOTE
[facility]    Brief Operative Note    Pre-operative diagnosis: Adenocarcinoma (H) [C80.1]  Colon cancer (H) [C18.9]  Post-operative diagnosis Same as pre-operative diagnosis    Procedure: Robotic converted to open low anterior resection, N/A - Abdomen    Surgeon: Surgeons and Role:     * Senia Fairbanks MD - Primary  Anesthesia: * No anesthesia type entered *   Estimated Blood Loss: 50 mL from 4/17/2025 12:37 PM to 4/17/2025  5:03 PM      Drains: Ronal-Parry  Specimens:   ID Type Source Tests Collected by Time Destination   1 : Sigmoid Colon and Rectum Tissue Large Intestine, Colon, Sigmoid/Rectal SURGICAL PATHOLOGY EXAM Senia Fairbanks MD 4/17/2025  4:09 PM    2 : Anastomotic Rings Tissue Large Intestine, Colon, Rings SURGICAL PATHOLOGY EXAM Senia Fairbanks MD 4/17/2025  4:22 PM      Findings:   There was no evidence of intra-abdominal spread of disease.  Tattoo was identified at the level of the anterior reflection.  Flexible sigmoidoscopy was performed to locate the tattoo as this was in the posterior position.  A scar was identified at the level of the tattoo consistent with the patient's previous polypectomy.  A lipoma was identified within the sigmoid colon.  Colorectal anastomosis was located at 8 cm from the anal verge.   Complications: None.  Implants: * No implants in log *      Condition on discharge from OR: Satisfactory    Mabel Song PA-C   [facility]   [phone].        ADDENDUM:    PATIENT DATA  Indicate Y or N:  Home O2 No  Hemodialysis  No  Transplant patient  No  Cirrhosis  No  Steroids in last 30 days  No  Immunomodulators in last 30 days  No  Anticoagulation at time of surgery  No  Prior abdominal surgery  No  Pelvic irradiation  No    Albumin within 30 days if known na   Hgb within 30 days if known 18.3   Hemoglobin   Date Value Ref Range Status   04/17/2025 18.3 (H) 13.3 - 17.7 g/dL Final   02/14/2017 16.3  13.3 - 17.7 g/dL Final   ]  Cr within 30 days if known 1.21   Creatinine   Date Value Ref Range Status   04/17/2025 1.21 (H) 0.67 - 1.17 mg/dL Final   01/20/2021 1.10 0.66 - 1.25 mg/dL Final   ]  Body mass index is 24.25 kg/m .      OR DATA  Emergent  No   <24 hours  No   <1 week  No  Bowel Prep Yes  Antibiotics  Yes  DVT prophylaxis    Heparin  Yes   SCD  Yes   None  No  Drain  Yes  ASA (1,2,3,4) 3  OR time (min) 225  Stents  No  Transfuse >/= 2U  No  Anastomosis   Stapled  Yes   Handsewn  No  Leak Test    Positive  No   Negative  Yes   Not done  No

## 2025-04-18 VITALS
HEART RATE: 110 BPM | SYSTOLIC BLOOD PRESSURE: 115 MMHG | WEIGHT: 194 LBS | HEIGHT: 75 IN | TEMPERATURE: 98 F | OXYGEN SATURATION: 95 % | RESPIRATION RATE: 21 BRPM | DIASTOLIC BLOOD PRESSURE: 78 MMHG | BODY MASS INDEX: 24.12 KG/M2

## 2025-04-18 LAB
ANION GAP SERPL CALCULATED.3IONS-SCNC: 11 MMOL/L (ref 7–15)
BUN SERPL-MCNC: 14.8 MG/DL (ref 6–20)
CALCIUM SERPL-MCNC: 8.8 MG/DL (ref 8.8–10.4)
CHLORIDE SERPL-SCNC: 102 MMOL/L (ref 98–107)
CREAT SERPL-MCNC: 1.09 MG/DL (ref 0.67–1.17)
EGFRCR SERPLBLD CKD-EPI 2021: 85 ML/MIN/1.73M2
GLUCOSE BLDC GLUCOMTR-MCNC: 113 MG/DL (ref 70–99)
GLUCOSE SERPL-MCNC: 134 MG/DL (ref 70–99)
HCO3 SERPL-SCNC: 25 MMOL/L (ref 22–29)
HGB BLD-MCNC: 15.2 G/DL (ref 13.3–17.7)
MAGNESIUM SERPL-MCNC: 1.9 MG/DL (ref 1.7–2.3)
PHOSPHATE SERPL-MCNC: 2.9 MG/DL (ref 2.5–4.5)
POTASSIUM SERPL-SCNC: 3.9 MMOL/L (ref 3.4–5.3)
SODIUM SERPL-SCNC: 138 MMOL/L (ref 135–145)

## 2025-04-18 PROCEDURE — 250N000013 HC RX MED GY IP 250 OP 250 PS 637: Performed by: COLON & RECTAL SURGERY

## 2025-04-18 PROCEDURE — 83735 ASSAY OF MAGNESIUM: CPT | Performed by: COLON & RECTAL SURGERY

## 2025-04-18 PROCEDURE — 84100 ASSAY OF PHOSPHORUS: CPT | Performed by: COLON & RECTAL SURGERY

## 2025-04-18 PROCEDURE — 85018 HEMOGLOBIN: CPT | Performed by: COLON & RECTAL SURGERY

## 2025-04-18 PROCEDURE — 250N000011 HC RX IP 250 OP 636: Mod: JZ | Performed by: COLON & RECTAL SURGERY

## 2025-04-18 PROCEDURE — 258N000003 HC RX IP 258 OP 636: Performed by: COLON & RECTAL SURGERY

## 2025-04-18 PROCEDURE — 80048 BASIC METABOLIC PNL TOTAL CA: CPT | Performed by: COLON & RECTAL SURGERY

## 2025-04-18 PROCEDURE — 120N000001 HC R&B MED SURG/OB

## 2025-04-18 PROCEDURE — 36415 COLL VENOUS BLD VENIPUNCTURE: CPT | Performed by: COLON & RECTAL SURGERY

## 2025-04-18 RX ORDER — METHOCARBAMOL 500 MG/1
500 TABLET, FILM COATED ORAL 4 TIMES DAILY PRN
Status: DISCONTINUED | OUTPATIENT
Start: 2025-04-18 | End: 2025-04-20 | Stop reason: HOSPADM

## 2025-04-18 RX ORDER — LIDOCAINE 4 G/G
2 PATCH TOPICAL
Status: DISCONTINUED | OUTPATIENT
Start: 2025-04-18 | End: 2025-04-20 | Stop reason: HOSPADM

## 2025-04-18 RX ADMIN — LIDOCAINE 2 PATCH: 4 PATCH TOPICAL at 16:37

## 2025-04-18 RX ADMIN — KETOROLAC TROMETHAMINE 15 MG: 15 INJECTION, SOLUTION INTRAMUSCULAR; INTRAVENOUS at 08:18

## 2025-04-18 RX ADMIN — ACETAMINOPHEN 1000 MG: 500 TABLET ORAL at 03:40

## 2025-04-18 RX ADMIN — SIMETHICONE 80 MG: 80 TABLET, CHEWABLE ORAL at 11:02

## 2025-04-18 RX ADMIN — ACETAMINOPHEN 1000 MG: 500 TABLET ORAL at 08:18

## 2025-04-18 RX ADMIN — OXYCODONE HYDROCHLORIDE 10 MG: 5 TABLET ORAL at 11:02

## 2025-04-18 RX ADMIN — ACETAMINOPHEN 1000 MG: 500 TABLET ORAL at 14:29

## 2025-04-18 RX ADMIN — OXYCODONE HYDROCHLORIDE 5 MG: 5 TABLET ORAL at 06:32

## 2025-04-18 RX ADMIN — HEPARIN SODIUM 5000 UNITS: 5000 INJECTION, SOLUTION INTRAVENOUS; SUBCUTANEOUS at 21:15

## 2025-04-18 RX ADMIN — KETOROLAC TROMETHAMINE 15 MG: 15 INJECTION, SOLUTION INTRAMUSCULAR; INTRAVENOUS at 14:29

## 2025-04-18 RX ADMIN — SODIUM CHLORIDE, SODIUM LACTATE, POTASSIUM CHLORIDE, AND CALCIUM CHLORIDE: .6; .31; .03; .02 INJECTION, SOLUTION INTRAVENOUS at 21:25

## 2025-04-18 RX ADMIN — ACETAMINOPHEN 1000 MG: 500 TABLET ORAL at 21:16

## 2025-04-18 RX ADMIN — OXYCODONE HYDROCHLORIDE 10 MG: 5 TABLET ORAL at 16:36

## 2025-04-18 RX ADMIN — SODIUM CHLORIDE, SODIUM LACTATE, POTASSIUM CHLORIDE, AND CALCIUM CHLORIDE: .6; .31; .03; .02 INJECTION, SOLUTION INTRAVENOUS at 08:21

## 2025-04-18 ASSESSMENT — ACTIVITIES OF DAILY LIVING (ADL)
ADLS_ACUITY_SCORE: 33
ADLS_ACUITY_SCORE: 30
ADLS_ACUITY_SCORE: 33

## 2025-04-18 NOTE — PLAN OF CARE
Notified provider about indwelling sparks catheter discussed removal or continued need.    Did provider choose to remove indwelling sparks catheter? No    Provider's sparks indication for keeping indwelling sparks catheter: Surgical procedure.    Is there an order for indwelling sparks catheter? Yes

## 2025-04-18 NOTE — PLAN OF CARE
Surgery/POD#: 1 - robotic converted to open low anterior resection   Orientation: AOx4   ABNL VS/O2: VSS on RA   ABNL Labs:   Pain Management: oxy, lidocaine patch, toradol, cold packs   Bowel/Bladder: abd soft, very tender, BS +, passing small gas, no BM, Tripp patent - adequate output   Drains/IVs: L CRISTO, IVF   Wounds/incisions: 4 port sites, 1 mini lap wnl   Diet: full liquid - tolerated some intake, denies n/v   Activity Level: SBA, ambulated in jaimes and in room   Tests/Procedures:   Anticipated  DC Date: pending ROBF   Significant Information:

## 2025-04-18 NOTE — PROGRESS NOTES
COLON & RECTAL SURGERY  PROGRESS NOTE    April 18, 2025  Post-op Day # 1    SUBJECTIVE:  Feeling ok, having some pain but overall controlled on current regimen. Tolerating FLD, no n/v. No gas or BM yet. UOP 1450cc. VSS. No am labs yet. CRISTO 30cc    OBJECTIVE:  Temp:  [96.8  F (36  C)-99.3  F (37.4  C)] 98.4  F (36.9  C)  Pulse:  [] 91  Resp:  [12-23] 17  BP: (110-162)/() 118/75  SpO2:  [93 %-100 %] 93 %    Intake/Output Summary (Last 24 hours) at 4/18/2025 0729  Last data filed at 4/18/2025 0600  Gross per 24 hour   Intake 3600 ml   Output 1600 ml   Net 2000 ml       GENERAL:  Awake, alert, no acute distress  HEAD: Normocephalic atraumatic  SCLERA: Anicteric  EXTREMITIES: Warm and well perfused  ABDOMEN:  Soft, appropriately tender, non-distended. No guarding, rigidity, or peritoneal signs. CRISTO mostly sanguinous output in bulb.  INCISION:  C/d/i    LABS:  Lab Results   Component Value Date    WBC 6.5 04/17/2025    WBC 5.7 02/14/2017     Lab Results   Component Value Date    HGB 18.3 04/17/2025    HGB 16.3 02/14/2017     Lab Results   Component Value Date    HCT 51.6 04/17/2025    HCT 46.5 02/14/2017     Lab Results   Component Value Date     04/17/2025     02/14/2017     Last Basic Metabolic Panel:  Lab Results   Component Value Date     04/17/2025     01/20/2021      Lab Results   Component Value Date    POTASSIUM 4.0 04/17/2025    POTASSIUM 3.9 06/22/2022    POTASSIUM 4.0 01/20/2021     Lab Results   Component Value Date    CHLORIDE 99 04/17/2025    CHLORIDE 105 06/22/2022    CHLORIDE 104 01/20/2021     Lab Results   Component Value Date    FIONA 9.8 04/17/2025    FIONA 9.0 01/20/2021     Lab Results   Component Value Date    CO2 25 04/17/2025    CO2 27 06/22/2022    CO2 31 01/20/2021     Lab Results   Component Value Date    BUN 14.5 04/17/2025    BUN 23 06/22/2022    BUN 17 01/20/2021     Lab Results   Component Value Date    CR 1.21 04/17/2025    CR 1.10 01/20/2021     Lab Results    Component Value Date     04/18/2025    GLC 86 06/22/2022    GLC 94 01/20/2021       ASSESSMENT/PLAN: 43yo male POD#1 s/p robotic converted to open LAR for locally advanced rectal cancer. Doing well and recovering appropriately. AROBF.    - Full liquid diet. Possibly advance to LFD later this afternoon.  - PRN pain meds  - Cont IVF until adequate po intake  - Cont Tripp until POD#2.  - OOB, ambulate  - Await pathology   - SQH for DVT ppx    Discussed with Dr. Fairbanks.    For questions/paging, please contact the CRS office at [phone].    Mabel Song PA-C  Colorectal Physician Assistant    [facility]  [address] [place] [id number]  T: [phone]  F: [phone]

## 2025-04-18 NOTE — PLAN OF CARE
Date & Time: 2030-0700  Surgery/POD#: POD 1 from Robotic converted to open low anterior resection  Behavior & Aggression: Green   Fall Risk: Yes  Orientation: A&Ox4  ABNL VS/O2:VSS on RA except tachy (100-110's). Improving during shift   Pain Management: PRN IV dilaudid x1, PRN oxy x1, PRN Toradol x1, and scheduled meds   Bowel/Bladder: Tripp in place. Denied passing gas, no BM   Drains: PIV infusing LR @ 75ml/hr. L CRISTO drain with bloody output   Wounds/incisions: x4 port sites and x1 mini lap CDI   Diet: Fulls- denied nausea during shift   Number of times OUT OF BED this shift : x1. Up SBA GB. Ambulated in room   Anticipated  DC Date: Pending

## 2025-04-18 NOTE — ANESTHESIA POSTPROCEDURE EVALUATION
Patient: Eric Mandel    Procedure: Procedure(s):  Robotic converted to open low anterior resection       Anesthesia Type:  General    Note:  Disposition: Inpatient   Postop Pain Control: Uneventful            Sign Out: Well controlled pain   PONV: No   Neuro/Psych: Uneventful            Sign Out: Acceptable/Baseline neuro status   Airway/Respiratory: Uneventful            Sign Out: Acceptable/Baseline resp. status   CV/Hemodynamics: Uneventful            Sign Out: Acceptable CV status; No obvious hypovolemia; No obvious fluid overload   Other NRE: NONE   DID A NON-ROUTINE EVENT OCCUR? No           Last vitals:  Vitals Value Taken Time   /81 04/17/25 1945   Temp 36  C (96.8  F) 04/17/25 1815   Pulse 110 04/17/25 1957   Resp 23 04/17/25 1957   SpO2 98 % 04/17/25 1958   Vitals shown include unfiled device data.    Electronically Signed By: Isaiah Adamson DO  April 17, 2025  10:59 PM

## 2025-04-19 LAB — GLUCOSE BLDC GLUCOMTR-MCNC: 106 MG/DL (ref 70–99)

## 2025-04-19 PROCEDURE — 120N000001 HC R&B MED SURG/OB

## 2025-04-19 PROCEDURE — 250N000011 HC RX IP 250 OP 636: Mod: JZ | Performed by: COLON & RECTAL SURGERY

## 2025-04-19 PROCEDURE — 250N000013 HC RX MED GY IP 250 OP 250 PS 637: Performed by: COLON & RECTAL SURGERY

## 2025-04-19 RX ADMIN — ACETAMINOPHEN 1000 MG: 500 TABLET ORAL at 11:08

## 2025-04-19 RX ADMIN — HEPARIN SODIUM 5000 UNITS: 5000 INJECTION, SOLUTION INTRAVENOUS; SUBCUTANEOUS at 21:29

## 2025-04-19 RX ADMIN — HEPARIN SODIUM 5000 UNITS: 5000 INJECTION, SOLUTION INTRAVENOUS; SUBCUTANEOUS at 05:05

## 2025-04-19 RX ADMIN — OXYCODONE HYDROCHLORIDE 10 MG: 5 TABLET ORAL at 02:37

## 2025-04-19 RX ADMIN — HEPARIN SODIUM 5000 UNITS: 5000 INJECTION, SOLUTION INTRAVENOUS; SUBCUTANEOUS at 14:18

## 2025-04-19 RX ADMIN — ACETAMINOPHEN 1000 MG: 500 TABLET ORAL at 17:05

## 2025-04-19 RX ADMIN — SIMETHICONE 80 MG: 80 TABLET, CHEWABLE ORAL at 17:56

## 2025-04-19 RX ADMIN — ACETAMINOPHEN 1000 MG: 500 TABLET ORAL at 21:28

## 2025-04-19 RX ADMIN — OXYCODONE HYDROCHLORIDE 5 MG: 5 TABLET ORAL at 15:59

## 2025-04-19 RX ADMIN — SIMETHICONE 80 MG: 80 TABLET, CHEWABLE ORAL at 02:37

## 2025-04-19 RX ADMIN — KETOROLAC TROMETHAMINE 15 MG: 15 INJECTION, SOLUTION INTRAMUSCULAR; INTRAVENOUS at 17:56

## 2025-04-19 RX ADMIN — ACETAMINOPHEN 1000 MG: 500 TABLET ORAL at 05:04

## 2025-04-19 ASSESSMENT — ACTIVITIES OF DAILY LIVING (ADL)
ADLS_ACUITY_SCORE: 40
ADLS_ACUITY_SCORE: 39
ADLS_ACUITY_SCORE: 33
ADLS_ACUITY_SCORE: 40
ADLS_ACUITY_SCORE: 39
ADLS_ACUITY_SCORE: 40
ADLS_ACUITY_SCORE: 39
ADLS_ACUITY_SCORE: 39
ADLS_ACUITY_SCORE: 36
ADLS_ACUITY_SCORE: 40
ADLS_ACUITY_SCORE: 40
ADLS_ACUITY_SCORE: 33
ADLS_ACUITY_SCORE: 39
ADLS_ACUITY_SCORE: 40
ADLS_ACUITY_SCORE: 40
ADLS_ACUITY_SCORE: 39

## 2025-04-19 NOTE — PLAN OF CARE
Goal Outcome Evaluation:      Plan of Care Reviewed With: patient    Overall Patient Progress: improvingOverall Patient Progress: improving    Shift: 7p-7a  Surgery/POD#: POD 2 from a robotic to open low anterior resection.  Behavior & Aggression: Green  Fall Risk: Yes  Orientation: A&O x4  ABNL VS/O2: VSS on RA  Tele: NA  ABNL Labs: NA  Pain Management: scheduled tylenol, PRN oxy x1, simethicone x1  Bowel/Bladder: sparks catheter, passing flatus, x1 small/loose BM overnight  Drains: LUQ CRISTO drain to bulb suction  Lines: PIV infusing LR @ 75 ml/hr  Skin: abd laps & x1 mini ESTIVEN w/ surgical glue  Diet: full liquids, denies N/V  Activity Level: SBA, ambulated in jaimes once last night  Tests/Procedures: NA  Anticipated  DC Date: pending  Significant Information:   CRS following. Plan to remove sparks catheter today. Pt refused PCDs.

## 2025-04-20 VITALS
OXYGEN SATURATION: 96 % | WEIGHT: 194 LBS | DIASTOLIC BLOOD PRESSURE: 90 MMHG | BODY MASS INDEX: 24.12 KG/M2 | TEMPERATURE: 97.5 F | HEART RATE: 78 BPM | SYSTOLIC BLOOD PRESSURE: 138 MMHG | HEIGHT: 75 IN | RESPIRATION RATE: 18 BRPM

## 2025-04-20 LAB — PLATELET # BLD AUTO: 193 10E3/UL (ref 150–450)

## 2025-04-20 PROCEDURE — 250N000013 HC RX MED GY IP 250 OP 250 PS 637: Performed by: COLON & RECTAL SURGERY

## 2025-04-20 PROCEDURE — 250N000011 HC RX IP 250 OP 636: Performed by: COLON & RECTAL SURGERY

## 2025-04-20 PROCEDURE — 36415 COLL VENOUS BLD VENIPUNCTURE: CPT | Performed by: COLON & RECTAL SURGERY

## 2025-04-20 PROCEDURE — 85049 AUTOMATED PLATELET COUNT: CPT | Performed by: COLON & RECTAL SURGERY

## 2025-04-20 PROCEDURE — 250N000011 HC RX IP 250 OP 636: Performed by: SURGERY

## 2025-04-20 RX ORDER — ENOXAPARIN SODIUM 100 MG/ML
40 INJECTION SUBCUTANEOUS EVERY 24 HOURS
Status: DISCONTINUED | OUTPATIENT
Start: 2025-04-20 | End: 2025-04-20 | Stop reason: HOSPADM

## 2025-04-20 RX ORDER — ENOXAPARIN SODIUM 100 MG/ML
40 INJECTION SUBCUTANEOUS EVERY 24 HOURS
Qty: 2 ML | Refills: 0 | Status: SHIPPED | OUTPATIENT
Start: 2025-04-20 | End: 2025-04-25

## 2025-04-20 RX ORDER — OXYCODONE HYDROCHLORIDE 5 MG/1
5 TABLET ORAL EVERY 6 HOURS PRN
Qty: 15 TABLET | Refills: 0 | Status: SHIPPED | OUTPATIENT
Start: 2025-04-20

## 2025-04-20 RX ADMIN — ENOXAPARIN SODIUM 40 MG: 40 INJECTION SUBCUTANEOUS at 13:17

## 2025-04-20 RX ADMIN — ACETAMINOPHEN 1000 MG: 500 TABLET ORAL at 06:05

## 2025-04-20 RX ADMIN — HEPARIN SODIUM 5000 UNITS: 5000 INJECTION, SOLUTION INTRAVENOUS; SUBCUTANEOUS at 06:06

## 2025-04-20 RX ADMIN — ACETAMINOPHEN 1000 MG: 500 TABLET ORAL at 13:17

## 2025-04-20 ASSESSMENT — ACTIVITIES OF DAILY LIVING (ADL)
ADLS_ACUITY_SCORE: 38
ADLS_ACUITY_SCORE: 42
ADLS_ACUITY_SCORE: 38
ADLS_ACUITY_SCORE: 42
ADLS_ACUITY_SCORE: 42
ADLS_ACUITY_SCORE: 38
ADLS_ACUITY_SCORE: 42
ADLS_ACUITY_SCORE: 38
ADLS_ACUITY_SCORE: 42

## 2025-04-20 NOTE — PLAN OF CARE
Shift: 04/19/25 6739-0256  Surgery/POD#: POD 2 from a robotic to open low anterior resection.  Behavior & Aggression: Green  Fall Risk: Yes  Orientation: A/O x4  ABNL VS/O2: VSS on RA  Tele: NA  ABNL Labs: NA  Pain Management: C/O pain in abdomen and gas pain, controlled with scheduled tylenol, PRN oxy x1, simethicone x1  Bowel/Bladder: sparks catheter removed this shift, voiding adequately and spontaneously, passing flatus, no BM this shift  Drains: LUQ CRISTO drain to bulb suction with small amount of serosanguineous output  Lines: PIV SL  Skin: abd laps & x1 mini ESTIVEN w/ surgical glue  Diet: Low fiber diet, denies N/V  Activity Level: SBA, ambulated in the halls x3 this shift  Tests/Procedures: NA  Anticipated  DC Date: pending  Significant Information:   CRS following.

## 2025-04-20 NOTE — PROGRESS NOTES
Patient discharged at 2:05 PM to home.  IV was discontinued. Pain at time of discharge was 2/10. Belongings returned to patient.  Discharge instructions and medications reviewed with patient.  Patient verbalized understanding and all questions were answered.  Prescriptions given to patient.  At time of discharge, patient condition was stable and left the unit by wheelchair with family escorted by staff.    CRISTO drain removed by MD and Hemanth LOWERY RN. Lovenox teaching completed with teach back. Pt ambulating independently in the halls. Tolerating diet, denies N/V. Pt passing gas and voiding spontaneously and adequately. Dressings sent home with patient.   [Negative] : Heme/Lymph

## 2025-04-20 NOTE — DISCHARGE SUMMARY
Discharge Summary    Patient name: Eric Mandel  YOB: 1980   Age: 44 year old  Medical Record Number: [id number]  Primary Care Physician:  Bran Bonilla       Admission Date: 4/17/2025.  Discharge Date: 04/20/25   Condition at Discharge: good       Principal Diagnosis:  Cancer of rectosigmoid (colon) (H)    HISTORY OF PRESENT ILLNESS: Eric Mandel is a 44 year old man who presented for a screening colonoscopy.  He was found to have an approximately 12 mm polyp in the rectosigmoid colon.  This was removed with a hot snare, however demonstrated underlying moderately differentiated adenocarcinoma with a positive resection margin.  He underwent a staging evaluation which demonstrated no evidence of metastatic spread of disease.  Given the positive margin and location within the rectosigmoid colon he was recommended to undergo definitive operative resection with a robotic assisted sigmoid colectomy versus an anterior resection.  The risk and benefits of this procedure have been discussed, and he has agreed to proceed.     PROCEDURES PERFORMED DURING HOSPITALIZATION: Robotic converted to open low anterior resection, colorectal anastomosis at 8 cm    FINAL PATHOLOGY: Pending    BRIEF HOSPITAL COURSE: This 44 year old male presented for an elective low anterior resection and was transferred to the surgical jimenez following the procedure.  Diet was advanced with return of bowel function.  Pain medication was transitioned from IV to oral uneventfully.  At the time of discharge, he was voiding freely, tolerating a regular diet, and pain was controlled with oral pain medications.  He was discharged home on POD #3 in stable condition.     PHYSICAL EXAM ON DATE OF DISCHARGE:   Head - Nomocephalic atraumatic  Sclera anicteric  Extremities warm and well perfused  Lungs - breathing non labored,   Abdomen - soft, wounds look good  Rectal exam - deferred  Skin - no rash  Psych - affect appropriate  Neuro - no focal  deficits    Vital Signs in last 24 hours:   Temp:  [97.8  F (36.6  C)-98  F (36.7  C)] 97.8  F (36.6  C)  Pulse:  [74-91] 91  Resp:  [17-18] 18  BP: (128-146)/(80-89) 146/89  SpO2:  [96 %-99 %] 96 %    COMPLICATIONS IN HOSPITAL: None    IMPORTANT PENDING TEST RESULTS: Final pathology    Discharge Medications/Orders:  Current Discharge Medication List        START taking these medications    Details   enoxaparin ANTICOAGULANT (LOVENOX) 40 MG/0.4ML syringe Inject 0.4 mLs (40 mg) subcutaneously every 24 hours for 5 days.  Qty: 2 mL, Refills: 0    Associated Diagnoses: Cancer of rectosigmoid (colon) (H)      oxyCODONE (ROXICODONE) 5 MG tablet Take 1 tablet (5 mg) by mouth every 6 hours as needed for moderate pain.  Qty: 15 tablet, Refills: 0    Associated Diagnoses: Cancer of rectosigmoid (colon) (H)           CONTINUE these medications which have NOT CHANGED    Details   diphenhydrAMINE (BENADRYL) 25 MG tablet Take 25 mg by mouth nightly as needed for sleep.      melatonin 5 MG tablet Take 1-2 tablets by mouth nightly as needed for sleep.      Multiple Vitamin (MULTIVITAMIN ADULT) TABS Take 1 tablet by mouth daily.               FOLLOW UP: He should see Bran Bonilla in 2-4 weeks.  Follow up with Dr. Fairbanks as scheduled.    Total time spent for discharge on date of discharge: 30 minutes, with over half spent in counseling and coordinating care    I saw the patient on the date of discharge.    Bon Darling MD  Fellow, Colon & Rectal Surgery  [facility]  04/20/2025  12:46 PM    Colorectal Surgery can be reached at [phone] at all times. Between 5pm and 7am you will be connected to the on-call physician           ADDENDUM:  Length of stay: 3 days  Indicate Y or N for the following:  UTI no  C diff no  PNA no  SSI no  DVT no  PE no  CVA no  MI no  Enterocutaneous fistula no  Peripheral nerve injury no  Abscess (not adjacent to anastomosis) no  Leak no                        Treated with:               Antibiotics no              Drain no              Reoperation no  Death within 30 days no  Reintubation no  Reoperation no              Procedure no      Pathology Results: Stage 3 rectal adenocarcinoma    Final Diagnosis   A) RECTOSIGMOID COLON, OPEN LOW ANTERIOR RESECTION:        1.  INVASIVE MODERATELY DIFFERENTIATED ADENOCARCINOMA, RECTUM (SEE SYNOPTIC REPORT)  A.  0.5 X 0.4 X 0.3 CM MUCOSAL AND SUBMUCOSAL TUMOR WITHIN POLYPECTOMY SCAR  B.  LYMPHOVASCULAR INVASION PRESENT  C.  TUMOR IS 1.0 CM FROM DISTAL RESECTION MARGIN, 1.3 CM FROM THE MESORECTAL MARGIN,        AND 15.3 CM FROM PROXIMAL MARGIN  D.  ADDITIONAL 3.7 CM SEGMENT OF UNREMARKABLE COLON  E.  STAGE: pT1 pN1b        2.  MESENTERIC LYMPH NODES:  A.  METASTATIC ADENOCARCINOMA PRESENT IN 3 OF 13 LYMPH NODES (3 OF 13)        - LARGEST METASTASES: 9 MM WITHIN A 9 X 7 MM LYMPH NODE  B.  MILD FOLLICULAR LYMPHOID HYPERPLASIA  C.  ANTHRACOTIC PIGMENT DEPOSITION        3.  BENIGN SUBMUCOSAL LIPOMA (1.2 CM)        4.  SESSILE SERRATED LESION, WITHOUT DYSPLASIA (5 MM)        5.  VIABLE-APPEARING PROXIMAL AND DISTAL SURGICAL MARGINS     B) COLON, ANASTOMOTIC RINGS:        - NO EVIDENCE OF IN SITU OR INVASIVE MALIGNANCY        - 0.5 TO 1.3 CM IN LENGTH

## 2025-04-20 NOTE — PLAN OF CARE
Goal Outcome Evaluation:      Plan of Care Reviewed With: patient    Overall Patient Progress: improvingOverall Patient Progress: improving     Shift: 04/19/25 [id number]  Surgery/POD#: POD 3 from a robotic to open low anterior resection.  Behavior & Aggression: Green  Fall Risk: Yes  Orientation: A/O x4  ABNL VS/O2: VSS on RA  Tele: NA  ABNL Labs: NA  Pain Management: Pain controlled with scheduled tylenol, PRN oxy  Bowel/Bladder: voiding in BR, passing flatus, large loose BM x1  Drains: LUQ CRISTO drain to bulb suction with small amount of serosanguineous output  Lines: PIV SL  Skin: abd laps & x1 mini ESTIVEN w/ surgical glue  Diet: Low fiber diet, denies N/V  Activity Level: Up ind  Tests/Procedures: NA  Anticipated  DC Date: pending  Significant Information: No acute concerns this shift. Awaiting pathology results. CRS following.

## 2025-04-21 ENCOUNTER — PATIENT OUTREACH (OUTPATIENT)
Dept: CARE COORDINATION | Facility: CLINIC | Age: 45
End: 2025-04-21
Payer: COMMERCIAL

## 2025-04-21 NOTE — PROGRESS NOTES
Clinic Care Coordination Contact  Transitions of Care Outreach    Chief Complaint   Patient presents with    Clinic Care Coordination - Post Hospital       Most Recent Admission Date: 4/17/2025   Most Recent Admission Diagnosis: Adenocarcinoma (H) - C80.1  Colon cancer (H) - C18.9  Cancer of rectosigmoid (colon) (H) - C19     Most Recent Discharge Date: 4/20/2025   Most Recent Discharge Diagnosis: Cancer of rectosigmoid (colon) (H) - C19     Transitions of Care Assessment    Discharge Assessment  How are you doing now that you are home?: Pt stated that he is doing fine.  How are your symptoms? (Red Flag symptoms escalate to triage hotline per guidelines): Improved  Do you know how to contact your clinic care team if you have future questions or changes to your health status? : Yes  Does the patient have their discharge instructions? : Yes  Does the patient have questions regarding their discharge instructions? : No  Were you started on any new medications or were there changes to any of your previous medications? : Yes  Does the patient have all of their medications?: Yes  Do you have questions regarding any of your medications? : No  Do you have all of your needed medical supplies or equipment (DME)?  (i.e. oxygen tank, CPAP, cane, etc.): Yes    Post-op ([facility] CTA Only)  If the patient had a surgery or procedure, do they have any questions for a nurse?: No         [facility] Explained and offered Care Coordination support to eligible patients: Yes    Patient accepted? No    Follow up Plan     No future appointments.  Outpatient Plan as outlined on AVS reviewed with patient.      For any urgent concerns, please contact our 24 hour nurse triage line: [phone]       Rubén, CHW  [phone]  [facility]    
pt is confused, minimally conversive, does answer questions when asked, is quiet or repeats answer to prior question repeatedly/50% of the time

## 2025-04-28 ENCOUNTER — TRANSCRIBE ORDERS (OUTPATIENT)
Dept: ONCOLOGY | Facility: CLINIC | Age: 45
End: 2025-04-28
Payer: COMMERCIAL

## 2025-04-28 DIAGNOSIS — C18.9 COLON CANCER (H): Primary | ICD-10-CM

## 2025-04-29 ENCOUNTER — PATIENT OUTREACH (OUTPATIENT)
Dept: ONCOLOGY | Facility: CLINIC | Age: 45
End: 2025-04-29
Payer: COMMERCIAL

## 2025-04-29 NOTE — TELEPHONE ENCOUNTER
RECORDS STATUS - ALL OTHER DIAGNOSIS      RECORDS RECEIVED FROM: [facility]   NOTES STATUS DETAILS   OFFICE NOTE from referring provider Ext: Colon Rectal  3/27/2025 - Dr. Senia Fairbanks   DISCHARGE SUMMARY from hospital [facility] 4/17/2025 - [facility]   3/11/2025 - [facility]    OPERATIVE REPORT [facility] 4/17/2025 - Robotic converted to (Abdomen)   open low anterior resection (Abdomen)   Rigid proctoscopy; Flexible sigmoidoscopy (Rectum)   3/11/2025 - Colonoscopy    MEDICATION LIST [facility]    LABS     PATHOLOGY REPORTS [facility] 4/17/2025 - [id number]   3/11/2025 - [id number]    ANYTHING RELATED TO DIAGNOSIS Epic 4/20/2025   IMAGING (NEED IMAGES & REPORT)     CT SCANS PACS CT CAP: 3/20/2025   MRI PACS MR Liver: 4/2/2025   ULTRASOUND PACS US Bx Thyroid: 3/27/2025  US Abdomen: 3/24/2025  US Thyroid: 3/24/2025

## 2025-04-29 NOTE — PROGRESS NOTES
Oncology referral received from Dr Fairbanks, [facility], for patient with colon cancer.    (See my bookmarks for pertinent records in Epic)      Hx cscope + colon adenocarcinoma 3/11/25 at [place]. Pt met w/ Dr Fairbanks at [facility] to plan colectomy, which was completed on 4/17 at [facility], path pT1 pN1b. Pt now needs to establish with Onc for adjuvant care. Need outside CT CAP and MR liver done prior to surgery which did not confirm any distant mets. We will call pt to offer first available Onc per pt preference.    Nick Blanco RN  Oncology Nurse Navigator  [facility]  [phone]

## 2025-05-13 ENCOUNTER — ONCOLOGY VISIT (OUTPATIENT)
Dept: ONCOLOGY | Facility: CLINIC | Age: 45
End: 2025-05-13
Attending: INTERNAL MEDICINE
Payer: COMMERCIAL

## 2025-05-13 ENCOUNTER — PRE VISIT (OUTPATIENT)
Dept: ONCOLOGY | Facility: CLINIC | Age: 45
End: 2025-05-13
Payer: COMMERCIAL

## 2025-05-13 ENCOUNTER — PATIENT OUTREACH (OUTPATIENT)
Dept: ONCOLOGY | Facility: CLINIC | Age: 45
End: 2025-05-13
Payer: COMMERCIAL

## 2025-05-13 VITALS
DIASTOLIC BLOOD PRESSURE: 76 MMHG | OXYGEN SATURATION: 99 % | RESPIRATION RATE: 16 BRPM | WEIGHT: 190 LBS | BODY MASS INDEX: 23.62 KG/M2 | SYSTOLIC BLOOD PRESSURE: 119 MMHG | HEIGHT: 75 IN | HEART RATE: 84 BPM

## 2025-05-13 DIAGNOSIS — C18.7 MALIGNANT NEOPLASM OF SIGMOID COLON (H): Primary | ICD-10-CM

## 2025-05-13 DIAGNOSIS — C19 CANCER OF RECTOSIGMOID (COLON) (H): ICD-10-CM

## 2025-05-13 PROCEDURE — 99245 OFF/OP CONSLTJ NEW/EST HI 55: CPT | Performed by: INTERNAL MEDICINE

## 2025-05-13 PROCEDURE — 99213 OFFICE O/P EST LOW 20 MIN: CPT | Performed by: INTERNAL MEDICINE

## 2025-05-13 RX ORDER — LORAZEPAM 2 MG/ML
0.5 INJECTION INTRAMUSCULAR EVERY 4 HOURS PRN
OUTPATIENT
Start: 2025-06-03

## 2025-05-13 RX ORDER — MEPERIDINE HYDROCHLORIDE 25 MG/ML
25 INJECTION INTRAMUSCULAR; INTRAVENOUS; SUBCUTANEOUS
OUTPATIENT
Start: 2025-06-03

## 2025-05-13 RX ORDER — METHYLPREDNISOLONE SODIUM SUCCINATE 40 MG/ML
40 INJECTION INTRAMUSCULAR; INTRAVENOUS
Start: 2025-06-03

## 2025-05-13 RX ORDER — EPINEPHRINE 1 MG/ML
0.3 INJECTION, SOLUTION INTRAMUSCULAR; SUBCUTANEOUS EVERY 5 MIN PRN
OUTPATIENT
Start: 2025-06-03

## 2025-05-13 RX ORDER — HEPARIN SODIUM (PORCINE) LOCK FLUSH IV SOLN 100 UNIT/ML 100 UNIT/ML
5 SOLUTION INTRAVENOUS
OUTPATIENT
Start: 2025-06-03

## 2025-05-13 RX ORDER — ALBUTEROL SULFATE 0.83 MG/ML
2.5 SOLUTION RESPIRATORY (INHALATION)
OUTPATIENT
Start: 2025-06-03

## 2025-05-13 RX ORDER — ONDANSETRON 2 MG/ML
8 INJECTION INTRAMUSCULAR; INTRAVENOUS ONCE
OUTPATIENT
Start: 2025-06-03

## 2025-05-13 RX ORDER — HEPARIN SODIUM,PORCINE 10 UNIT/ML
5-20 VIAL (ML) INTRAVENOUS DAILY PRN
OUTPATIENT
Start: 2025-06-03

## 2025-05-13 RX ORDER — DIPHENHYDRAMINE HYDROCHLORIDE 50 MG/ML
25 INJECTION, SOLUTION INTRAMUSCULAR; INTRAVENOUS
Start: 2025-06-03

## 2025-05-13 RX ORDER — ALBUTEROL SULFATE 90 UG/1
1-2 INHALANT RESPIRATORY (INHALATION)
Start: 2025-06-03

## 2025-05-13 RX ORDER — DIPHENHYDRAMINE HYDROCHLORIDE 50 MG/ML
50 INJECTION, SOLUTION INTRAMUSCULAR; INTRAVENOUS
Start: 2025-06-03

## 2025-05-13 ASSESSMENT — PAIN SCALES - GENERAL: PAINLEVEL_OUTOF10: MILD PAIN (2)

## 2025-05-13 NOTE — PROGRESS NOTES
05/13/25 1605   OTHER   Assessment completed with: Patient;Family   Plan of Care Education    Yearly learning assessment completed? Yes (see Education tab)   Diagnosis: colon cancer   Does patient understand diagnosis? Yes   Tx plan/regimen: Oxaliplatin/Xeloda   Does patient understand treatment plan/regimen? Yes   Preparing for treatment: Reviewed treatment preparation information with patient (vascular access, day of chemo, visitor policy, what to bring, etc.)   Vascular access education provided for: Peripheral IV   Side effect education: Diarrhea/Constipation;Mouth sores;Mylosuppression;Nausea/Vomiting;Neuropathy;Immune-mediated effects;Infection;Lab value monitoring (anemia, neutropenia, thrombocytopenia);Fatigue   Safety/self care at home reviewed with patient: Yes   Coping - concerns/fears reviewed with patient: Yes   Plan of Care: Lab appointment;Imaging;MD follow-up appointment;Treatment schedule   When to call provider: Bleeding;Increased shortness of breath;New/worsening pain;Shaking chills;Temperature >100.4F;Uncontrolled diarrhea/constipation;Uncontrolled nausea/vomiting   Reasons for deferring treatment reviewed with patient: Yes   Additional education provided for:  Neutropenic precautions   Evaluation of Learning   Patient Education Provided Yes   Readiness: Acceptance   Method: Literature;Explanation   Response: Verbalizes understanding

## 2025-05-13 NOTE — PROGRESS NOTES
[facility]: Cancer Care Plan of Care Education Note                                    Discussion with Patient:                                                      Writer reviewed with patient and his family side effects of  Oxaliplatin.      Antiemetics: Zofran and Decadron        Assessment:                                                      Assessment completed with:: Patient, Family    Plan of Care Education   Yearly learning assessment completed?: Yes (see Education tab)  Diagnosis:: colon cancer  Does patient understand diagnosis?: Yes  Tx plan/regimen:: (P) Oxaliplatin/Xeloda  Does patient understand treatment plan/regimen?: (P) Yes  Preparing for treatment:: (P) Reviewed treatment preparation information with patient (vascular access, day of chemo, visitor policy, what to bring, etc.)  Vascular access education provided for:: (P) Peripheral IV  Side effect education:: (P) Diarrhea/Constipation, Mouth sores, Mylosuppression, Nausea/Vomiting, Neuropathy, Immune-mediated effects, Infection, Lab value monitoring (anemia, neutropenia, thrombocytopenia), Fatigue  Safety/self care at home reviewed with patient:: (P) Yes  Coping - concerns/fears reviewed with patient:: (P) Yes  Plan of Care:: (P) Lab appointment, Imaging, MD follow-up appointment, Treatment schedule  When to call provider:: (P) Bleeding, Increased shortness of breath, New/worsening pain, Shaking chills, Temperature >100.4F, Uncontrolled diarrhea/constipation, Uncontrolled nausea/vomiting  Reasons for deferring treatment reviewed with patient:: (P) Yes  Additional education provided for: : (P) Neutropenic precautions    Evaluation of Learning  Patient Education Provided: (P) Yes  Readiness:: (P) Acceptance  Method:: (P) Literature, Explanation  Response:: (P) Verbalizes understanding    Plan of Care Education Review:   Assessment completed with:: Patient, Family    Plan of Care Education   Yearly learning assessment completed?: Yes (see  Education tab)  Diagnosis:: colon cancer  Does patient understand diagnosis?: Yes  Tx plan/regimen:: Oxaliplatin/Xeloda  Does patient understand treatment plan/regimen?: Yes  Preparing for treatment:: Reviewed treatment preparation information with patient (vascular access, day of chemo, visitor policy, what to bring, etc.)  Vascular access education provided for:: Peripheral IV  Side effect education:: Diarrhea/Constipation, Mouth sores, Mylosuppression, Nausea/Vomiting, Neuropathy, Immune-mediated effects, Infection, Lab value monitoring (anemia, neutropenia, thrombocytopenia), Fatigue  Safety/self care at home reviewed with patient:: Yes  Coping - concerns/fears reviewed with patient:: Yes  Plan of Care:: Lab appointment, Imaging, MD follow-up appointment, Treatment schedule  When to call provider:: Bleeding, Increased shortness of breath, New/worsening pain, Shaking chills, Temperature >100.4F, Uncontrolled diarrhea/constipation, Uncontrolled nausea/vomiting  Reasons for deferring treatment reviewed with patient:: Yes  Additional education provided for: : Neutropenic precautions    Evaluation of Learning  Patient Education Provided: Yes  Readiness:: Acceptance  Method:: Literature, Explanation  Response:: Verbalizes understanding           Intervention/Education provided during outreach:                                                         Patient to call/[employer] message with updates  Confirmed patient has clinic and triage numbers    Signature:  Eileen Sommers RN

## 2025-05-13 NOTE — LETTER
5/13/2025         RE: Eric Mandel  [address] [facility] [address]      This consult has been requested by Dr. Seina Fairbanks for colorectal cancer.    Wife and mother were present.    Mr. Mandel is a 44-year-old gentleman with rectosigmoid adenocarcinoma.  Investigations were reviewed and summarized below.  1.  Screening colonoscopy on 03/11/2025 revealed polyp in distal transverse colon and rectosigmoid colon.  - Transverse colon polyp is tubular adenoma, no evidence of high-grade dysplasia or malignancy.  - Rectosigmoid polyp revealed moderately differentiated adenocarcinoma, arising in tubular villous adenoma with high-grade dysplasia, invasive into submucosa with positive deep margin.  MMR intact.  2.  CT chest, abdomen and pelvis on 03/20/2025 did not reveal any evidence of metastatic disease.  There is liver segment 7 lesion most likely cavernous hemangioma.  There is enlarged multinodular thyroid nodule.  - CEA on 03/20/2025 is normal at 0.9.  - MRI of the liver in 04/02/25 revealed benign cavernous hemangiomas.  3. Thyroid ultrasound 03/20/2025 revealed thyroid nodules.  - Ultrasound-guided right thyroid FNA on 03/27/2025 is benign, consistent with follicular nodular disease.  3.  On 04/17/2025, patient had low anterior resection.  No intra-abdominal metastasis.  - Pathology reveals 0.5 cm moderately differentiated adenocarcinoma within polypectomy scar.  There is lymphovascular invasion.  3 of 13 mesenteric lymph nodes are positive for malignancy.  pT1 pN1b.    Patient denies any personal history of cancer.  Denies any family history of colorectal cancer.    Patient overall doing well.  He is recovering well from surgery.  No headache.  No dizziness.  No excessive fatigue.  No chest pain.  No shortness of breath.  Occasional mild pain at incision site.  No urinary complaints.  No bowel problem.  No bleeding.  No neuropathy.    He plays guitar semiprofessionally.    Allergies:  Triage:  Pt to ED due to concerns over nausea, dizziness, and HA. Pt states symptoms began last night and have progressed in severity since onset. Pt called PCP and was referred to ED for evaluation and treatment. Pt into triage via wheel chair, Pt states the dizziness causes her to feel unsteady on her feet. No visible cues of distress present in triage. Reviewed    Medications: Reviewed    Past medical history: Not significant.    Past surgical history: Not significant.    Social history:  - No smoking.  - Occasional alcohol use.  - He plays guitar.    Family history:  - No family history of colorectal cancer.  - Father had pulmonary fibrosis.    Exam:  Patient is alert and oriented x 3.  Not in any distress.  Vitals: Reviewed.  Rest of system not examined.    Labs: Reviewed.    Assessment:  1.  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  MMR intact.  2.  Thyroid nodules.  3.  Liver hemangioma.    Recommendation:  -PET scan.  -Start chemotherapy at [facility] with xeloda and oxaliplatin.  -See CARRIE with each treatment.  -Virtual visit after PET scan.  -Genetic counselor appointment.    Discussion:  1.  I had a long discussion with the patient, his wife and his mother.  Investigations including labs, colonoscopy, CT scan, MRI and surgical pathology was reviewed.    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  He had low anterior resection done.    Explained to the patient that we will treat him as colon cancer.    2.  Discussed regarding adjuvant treatment.  Explained to him that adjuvant treatment will help to reduce the risk of recurrence.    Discussed regarding chemotherapy.  Discussed regarding FOLFOX and CAPOX.  Side effect of oxaliplatin and Xeloda/5-FU discussed.    After discussion, patient would like Xeloda and oxaliplatin.  Regimen and side effects reviewed.  I discussed regarding peripheral neuropathy and cold sensitivity. Patient plays guitar semiprofessionally.  Explained to the patient that it can interfere with his guitar playing.  He understands the risk.  He would like to proceed with chemotherapy.  Advised him to let us know if neuropathy starts interfering with activities.    Discussed regarding sperm banking.  He does not want any children.  No sperm banking.    Discussed  regarding duration of treatment.  He has low risk disease.  I would recommend 3 months of treatment with Xeloda and oxaliplatin.    Discussed regarding radiation.  I would not recommend radiation.  I will review the case with radiation oncologist.    He and his family had multiple questions regarding chemotherapy which were all discussed.    3.  Will set him up to see a genetic counselor.    4.  Patient would like a PET scan.  Will get PET-CT in next few weeks.    5.  He has thyroid nodules.  This will be monitored once a year with thyroid ultrasound.    6.  I will see him after the PET scan.  He will see our CARRIE with each treatment.    Thanks for the consult.    Total visit time of 80 minutes.  Time spent in today's visit, review of chart/investigations today, discussing regarding high risk drugs and documentation today.                Dudley Galvin MD

## 2025-05-13 NOTE — PROGRESS NOTES
This consult has been requested by Dr. Senia Fairbanks for colorectal cancer.    Wife and mother were present.    Mr. Mandel is a 44-year-old gentleman with rectosigmoid adenocarcinoma.  Investigations were reviewed and summarized below.  1.  Screening colonoscopy on 03/11/2025 revealed polyp in distal transverse colon and rectosigmoid colon.  - Transverse colon polyp is tubular adenoma, no evidence of high-grade dysplasia or malignancy.  - Rectosigmoid polyp revealed moderately differentiated adenocarcinoma, arising in tubular villous adenoma with high-grade dysplasia, invasive into submucosa with positive deep margin.  MMR intact.  2.  CT chest, abdomen and pelvis on 03/20/2025 did not reveal any evidence of metastatic disease.  There is liver segment 7 lesion most likely cavernous hemangioma.  There is enlarged multinodular thyroid nodule.  - CEA on 03/20/2025 is normal at 0.9.  - MRI of the liver in 04/02/25 revealed benign cavernous hemangiomas.  3. Thyroid ultrasound 03/20/2025 revealed thyroid nodules.  - Ultrasound-guided right thyroid FNA on 03/27/2025 is benign, consistent with follicular nodular disease.  3.  On 04/17/2025, patient had low anterior resection.  No intra-abdominal metastasis.  - Pathology reveals 0.5 cm moderately differentiated adenocarcinoma within polypectomy scar.  There is lymphovascular invasion.  3 of 13 mesenteric lymph nodes are positive for malignancy.  pT1 pN1b.    Patient denies any personal history of cancer.  Denies any family history of colorectal cancer.    Patient overall doing well.  He is recovering well from surgery.  No headache.  No dizziness.  No excessive fatigue.  No chest pain.  No shortness of breath.  Occasional mild pain at incision site.  No urinary complaints.  No bowel problem.  No bleeding.  No neuropathy.    He plays guitar semiprofessionally.    Allergies: Reviewed    Medications: Reviewed    Past medical history: Not significant.    Past surgical history: Not  significant.    Social history:  - No smoking.  - Occasional alcohol use.  - He plays guitar.    Family history:  - No family history of colorectal cancer.  - Father had pulmonary fibrosis.    Exam:  Patient is alert and oriented x 3.  Not in any distress.  Vitals: Reviewed.  Rest of system not examined.    Labs: Reviewed.    Assessment:  1.  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  MMR intact.  2.  Thyroid nodules.  3.  Liver hemangioma.    Recommendation:  -PET scan.  -Start chemotherapy at [facility] with xeloda and oxaliplatin.  -See CARRIE with each treatment.  -Virtual visit after PET scan.  -Genetic counselor appointment.    Discussion:  1.  I had a long discussion with the patient, his wife and his mother.  Investigations including labs, colonoscopy, CT scan, MRI and surgical pathology was reviewed.    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  He had low anterior resection done.    Explained to the patient that we will treat him as colon cancer.    2.  Discussed regarding adjuvant treatment.  Explained to him that adjuvant treatment will help to reduce the risk of recurrence.    Discussed regarding chemotherapy.  Discussed regarding FOLFOX and CAPOX.  Side effect of oxaliplatin and Xeloda/5-FU discussed.    After discussion, patient would like Xeloda and oxaliplatin.  Regimen and side effects reviewed.  I discussed regarding peripheral neuropathy and cold sensitivity. Patient plays guitar semiprofessionally.  Explained to the patient that it can interfere with his guitar playing.  He understands the risk.  He would like to proceed with chemotherapy.  Advised him to let us know if neuropathy starts interfering with activities.    Discussed regarding sperm banking.  He does not want any children.  No sperm banking.    Discussed regarding duration of treatment.  He has low risk disease.  I would recommend 3 months of treatment with Xeloda  and oxaliplatin.    Discussed regarding radiation.  I would not recommend radiation.  I will review the case with radiation oncologist.    He and his family had multiple questions regarding chemotherapy which were all discussed.    3.  Will set him up to see a genetic counselor.    4.  Patient would like a PET scan.  Will get PET-CT in next few weeks.    5.  He has thyroid nodules.  This will be monitored once a year with thyroid ultrasound.    6.  I will see him after the PET scan.  He will see our CARRIE with each treatment.    Thanks for the consult.    Total visit time of 80 minutes.  Time spent in today's visit, review of chart/investigations today, discussing regarding high risk drugs and documentation today.

## 2025-05-13 NOTE — LETTER
5/13/2025      Eric Mandel  [id number] [place] [address]      Dear Colleague,    Thank you for referring your patient, Eric Mandel, to the [facility]. Please see a copy of my visit note below.    This consult has been requested by Dr. Senia Fairbanks for colorectal cancer.    Wife and mother were present.    Mr. Mandel is a 44-year-old gentleman with rectosigmoid adenocarcinoma.  Investigations were reviewed and summarized below.  1.  Screening colonoscopy on 03/11/2025 revealed polyp in distal transverse colon and rectosigmoid colon.  - Transverse colon polyp is tubular adenoma, no evidence of high-grade dysplasia or malignancy.  - Rectosigmoid polyp revealed moderately differentiated adenocarcinoma, arising in tubular villous adenoma with high-grade dysplasia, invasive into submucosa with positive deep margin.  MMR intact.  2.  CT chest, abdomen and pelvis on 03/20/2025 did not reveal any evidence of metastatic disease.  There is liver segment 7 lesion most likely cavernous hemangioma.  There is enlarged multinodular thyroid nodule.  - CEA on 03/20/2025 is normal at 0.9.  - MRI of the liver in 04/02/25 revealed benign cavernous hemangiomas.  3. Thyroid ultrasound 03/20/2025 revealed thyroid nodules.  - Ultrasound-guided right thyroid FNA on 03/27/2025 is benign, consistent with follicular nodular disease.  3.  On 04/17/2025, patient had low anterior resection.  No intra-abdominal metastasis.  - Pathology reveals 0.5 cm moderately differentiated adenocarcinoma within polypectomy scar.  There is lymphovascular invasion.  3 of 13 mesenteric lymph nodes are positive for malignancy.  pT1 pN1b.    Patient denies any personal history of cancer.  Denies any family history of colorectal cancer.    Patient overall doing well.  He is recovering well from surgery.  No headache.  No dizziness.  No excessive fatigue.  No chest pain.  No shortness of breath.  Occasional mild pain at incision  site.  No urinary complaints.  No bowel problem.  No bleeding.  No neuropathy.    He plays guitar semiprofessionally.    Allergies: Reviewed    Medications: Reviewed    Past medical history: Not significant.    Past surgical history: Not significant.    Social history:  - No smoking.  - Occasional alcohol use.  - He plays guitar.    Family history:  - No family history of colorectal cancer.  - Father had pulmonary fibrosis.    Exam:  Patient is alert and oriented x 3.  Not in any distress.  Vitals: Reviewed.  Rest of system not examined.    Labs: Reviewed.    Assessment:  1.  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  MMR intact.  2.  Thyroid nodules.  3.  Liver hemangioma.    Recommendation:  -PET scan.  -Start chemotherapy at [facility] with xeloda and oxaliplatin.  -See CARRIE with each treatment.  -Virtual visit after PET scan.  -Genetic counselor appointment.    Discussion:  1.  I had a long discussion with the patient, his wife and his mother.  Investigations including labs, colonoscopy, CT scan, MRI and surgical pathology was reviewed.    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  He had low anterior resection done.    Explained to the patient that we will treat him as colon cancer.    2.  Discussed regarding adjuvant treatment.  Explained to him that adjuvant treatment will help to reduce the risk of recurrence.    Discussed regarding chemotherapy.  Discussed regarding FOLFOX and CAPOX.  Side effect of oxaliplatin and Xeloda/5-FU discussed.    After discussion, patient would like Xeloda and oxaliplatin.  Regimen and side effects reviewed.  I discussed regarding peripheral neuropathy and cold sensitivity. Patient plays guitar semiprofessionally.  Explained to the patient that it can interfere with his guitar playing.  He understands the risk.  He would like to proceed with chemotherapy.  Advised him to let us know if neuropathy starts  interfering with activities.    Discussed regarding sperm banking.  He does not want any children.  No sperm banking.    Discussed regarding duration of treatment.  He has low risk disease.  I would recommend 3 months of treatment with Xeloda and oxaliplatin.    Discussed regarding radiation.  I would not recommend radiation.  I will review the case with radiation oncologist.    He and his family had multiple questions regarding chemotherapy which were all discussed.    3.  Will set him up to see a genetic counselor.    4.  Patient would like a PET scan.  Will get PET-CT in next few weeks.    5.  He has thyroid nodules.  This will be monitored once a year with thyroid ultrasound.    6.  I will see him after the PET scan.  He will see our CARRIE with each treatment.    Thanks for the consult.    Total visit time of 80 minutes.  Time spent in today's visit, review of chart/investigations today, discussing regarding high risk drugs and documentation today.            Again, thank you for allowing me to participate in the care of your patient.        Sincerely,        Dudley Galvin MD    Electronically signed

## 2025-05-13 NOTE — PATIENT INSTRUCTIONS
-PET scan.  -Start chemotherapy at [facility] with xeloda and oxaliplatin.  -See CARRIE with each treatment.  -Virtual visit after PET scan.  -Genetic counselor appointment.

## 2025-05-14 ENCOUNTER — TELEPHONE (OUTPATIENT)
Dept: PHARMACY | Facility: CLINIC | Age: 45
End: 2025-05-14
Payer: COMMERCIAL

## 2025-05-14 ENCOUNTER — PATIENT OUTREACH (OUTPATIENT)
Dept: ONCOLOGY | Facility: CLINIC | Age: 45
End: 2025-05-14
Payer: COMMERCIAL

## 2025-05-14 NOTE — TELEPHONE ENCOUNTER
I called [employer] as the test claim is still showing PA required. They said their system messed up the PA approval dates, they were able to get a paid claim with tomorrows 5/15 date. They are unable to fix the dating so they suggested running a test claim tomorrow. They said [facility] can fill this.

## 2025-05-14 NOTE — TELEPHONE ENCOUNTER
PA Initiation    Medication: CAPECITABINE 500 MG PO TABS  Insurance Company: [employer] - Phone [phone] Fax [phone]  Pharmacy Filling the Rx:    Filling Pharmacy Phone:    Filling Pharmacy Fax:    Start Date: 5/14/2025       Self

## 2025-05-14 NOTE — PROGRESS NOTES
New Patient Oncology Nurse Navigator Note     Referring provider: Dudley Galvin MD      Referring Clinic/Organization:     [facility]        Referred to (specialty:) Genetic Counseling and Cancer Risk Management     Requested provider (if applicable): NA     Date Referral Received: May 14, 2025     Evaluation for:  C18.7 (ICD-10-CM) - Malignant neoplasm of sigmoid colon (H) ( Colon Cancer at 44)    Payor: BCBS / Plan: [place] / Product Type: Indemnity /     May 14, 2025  Referral received and reviewed.   Sent to NPS to schedule.     Lori ZUÑIGAN, RN, OCN  Oncology Nurse Navigator   [facility]  Cancer Care Service Line   New Patient Hem/Onc Scheduling / Referrals: [phone] (fax: [phone] )

## 2025-05-15 NOTE — TELEPHONE ENCOUNTER
Prior Authorization Approval    Medication: CAPECITABINE 500 MG PO TABS  Authorization Effective Date: 4/14/2025  Authorization Expiration Date: 5/14/2026  Approved Dose/Quantity: 112/21   Reference #:     Insurance Company: [employer] - Phone [phone] Fax [phone]  Expected CoPay: $ 50  CoPay Card Available:      Financial Assistance Needed: no  Which Pharmacy is filling the prescription: [facility] - [place], [place] - [address]  Pharmacy Notified: yes  Patient Notified: yes

## 2025-05-21 ENCOUNTER — TELEPHONE (OUTPATIENT)
Dept: ONCOLOGY | Facility: CLINIC | Age: 45
End: 2025-05-21
Payer: COMMERCIAL

## 2025-05-21 NOTE — TELEPHONE ENCOUNTER
[facility] is calling.   States that they received a message from pt's insurance company for a STAT fill of Rx for capecitabine.     Writer talked to Ladonna, pharmacist.   Ladonna also checked with Lamar in the PA dept.  Per Lamar, this Rx will be filled at [facility]. It will not be filled at [facility].    [facility] team was notified and will update their notes.    Jessica Sultana RN on 5/21/2025 at 9:36 AM

## 2025-05-28 ENCOUNTER — RESULTS FOLLOW-UP (OUTPATIENT)
Dept: ONCOLOGY | Facility: CLINIC | Age: 45
End: 2025-05-28

## 2025-05-28 ENCOUNTER — HOSPITAL ENCOUNTER (OUTPATIENT)
Dept: PET IMAGING | Facility: HOSPITAL | Age: 45
Discharge: HOME | End: 2025-05-28
Attending: INTERNAL MEDICINE
Payer: COMMERCIAL

## 2025-05-28 DIAGNOSIS — C18.7 MALIGNANT NEOPLASM OF SIGMOID COLON (H): ICD-10-CM

## 2025-05-28 LAB — GLUCOSE BLDC GLUCOMTR-MCNC: 97 MG/DL (ref 70–99)

## 2025-05-28 PROCEDURE — 250N000011 HC RX IP 250 OP 636: Performed by: INTERNAL MEDICINE

## 2025-05-28 PROCEDURE — 343N000001 HC RX 343 MED OP 636: Performed by: INTERNAL MEDICINE

## 2025-05-28 PROCEDURE — A9552 F18 FDG: HCPCS | Performed by: INTERNAL MEDICINE

## 2025-05-28 PROCEDURE — 78815 PET IMAGE W/CT SKULL-THIGH: CPT | Mod: PI

## 2025-05-28 PROCEDURE — 82962 GLUCOSE BLOOD TEST: CPT

## 2025-05-28 PROCEDURE — 71260 CT THORAX DX C+: CPT

## 2025-05-28 RX ORDER — IOPAMIDOL 755 MG/ML
93 INJECTION, SOLUTION INTRAVASCULAR ONCE
Status: COMPLETED | OUTPATIENT
Start: 2025-05-28 | End: 2025-05-28

## 2025-05-28 RX ORDER — FLUDEOXYGLUCOSE F 18 200 MCI/ML
7-18 INJECTION, SOLUTION INTRAVENOUS ONCE
Status: COMPLETED | OUTPATIENT
Start: 2025-05-28 | End: 2025-05-28

## 2025-05-28 RX ADMIN — IOPAMIDOL 93 ML: 755 INJECTION, SOLUTION INTRAVENOUS at 07:59

## 2025-05-28 RX ADMIN — FLUDEOXYGLUCOSE F 18 11.55 MILLICURIE: 200 INJECTION, SOLUTION INTRAVENOUS at 07:02

## 2025-05-29 NOTE — RESULT ENCOUNTER NOTE
Dear Mr. Mandel,    This is the result of PET scan. No evidence of spread of colon cancer.    There is activity in left neck gland. We will discuss about it during appointment.    Please, call me with any questions.    Dudley Galvin MD

## 2025-06-02 ENCOUNTER — DOCUMENTATION ONLY (OUTPATIENT)
Dept: ONCOLOGY | Facility: CLINIC | Age: 45
End: 2025-06-02
Payer: COMMERCIAL

## 2025-06-03 ENCOUNTER — LAB (OUTPATIENT)
Dept: INFUSION THERAPY | Facility: CLINIC | Age: 45
End: 2025-06-03
Attending: INTERNAL MEDICINE
Payer: COMMERCIAL

## 2025-06-03 ENCOUNTER — DOCUMENTATION ONLY (OUTPATIENT)
Dept: ONCOLOGY | Facility: CLINIC | Age: 45
End: 2025-06-03

## 2025-06-03 ENCOUNTER — ALLIED HEALTH/NURSE VISIT (OUTPATIENT)
Dept: ONCOLOGY | Facility: CLINIC | Age: 45
End: 2025-06-03

## 2025-06-03 ENCOUNTER — ONCOLOGY VISIT (OUTPATIENT)
Dept: ONCOLOGY | Facility: CLINIC | Age: 45
End: 2025-06-03
Attending: INTERNAL MEDICINE
Payer: COMMERCIAL

## 2025-06-03 VITALS
TEMPERATURE: 97.6 F | OXYGEN SATURATION: 98 % | SYSTOLIC BLOOD PRESSURE: 128 MMHG | DIASTOLIC BLOOD PRESSURE: 87 MMHG | HEART RATE: 77 BPM | RESPIRATION RATE: 16 BRPM | BODY MASS INDEX: 24.74 KG/M2 | HEIGHT: 75 IN | WEIGHT: 199 LBS

## 2025-06-03 VITALS
SYSTOLIC BLOOD PRESSURE: 130 MMHG | OXYGEN SATURATION: 99 % | TEMPERATURE: 97.5 F | RESPIRATION RATE: 20 BRPM | HEART RATE: 83 BPM | DIASTOLIC BLOOD PRESSURE: 83 MMHG

## 2025-06-03 DIAGNOSIS — C19 CANCER OF RECTOSIGMOID (COLON) (H): Primary | ICD-10-CM

## 2025-06-03 DIAGNOSIS — C18.7 MALIGNANT NEOPLASM OF SIGMOID COLON (H): Primary | ICD-10-CM

## 2025-06-03 DIAGNOSIS — R22.1 LUMP IN NECK: ICD-10-CM

## 2025-06-03 DIAGNOSIS — Z00.6 RESEARCH STUDY PATIENT: Primary | ICD-10-CM

## 2025-06-03 LAB
ALBUMIN SERPL BCG-MCNC: 4.4 G/DL (ref 3.5–5.2)
ALP SERPL-CCNC: 86 U/L (ref 40–150)
ALT SERPL W P-5'-P-CCNC: 19 U/L (ref 0–70)
ANION GAP SERPL CALCULATED.3IONS-SCNC: 8 MMOL/L (ref 7–15)
AST SERPL W P-5'-P-CCNC: 22 U/L (ref 0–45)
BASOPHILS # BLD AUTO: 0 10E3/UL (ref 0–0.2)
BASOPHILS NFR BLD AUTO: 1 %
BILIRUB SERPL-MCNC: 0.5 MG/DL
BUN SERPL-MCNC: 16.7 MG/DL (ref 6–20)
CALCIUM SERPL-MCNC: 9.4 MG/DL (ref 8.8–10.4)
CEA SERPL-MCNC: 1.1 NG/ML
CHLORIDE SERPL-SCNC: 103 MMOL/L (ref 98–107)
CREAT SERPL-MCNC: 1.14 MG/DL (ref 0.67–1.17)
EGFRCR SERPLBLD CKD-EPI 2021: 81 ML/MIN/1.73M2
EOSINOPHIL # BLD AUTO: 0.3 10E3/UL (ref 0–0.7)
EOSINOPHIL NFR BLD AUTO: 6 %
ERYTHROCYTE [DISTWIDTH] IN BLOOD BY AUTOMATED COUNT: 13.2 % (ref 10–15)
GLUCOSE SERPL-MCNC: 83 MG/DL (ref 70–99)
HCO3 SERPL-SCNC: 29 MMOL/L (ref 22–29)
HCT VFR BLD AUTO: 49.2 % (ref 40–53)
HGB BLD-MCNC: 16.8 G/DL (ref 13.3–17.7)
IMM GRANULOCYTES # BLD: 0 10E3/UL
IMM GRANULOCYTES NFR BLD: 0 %
LYMPHOCYTES # BLD AUTO: 1.4 10E3/UL (ref 0.8–5.3)
LYMPHOCYTES NFR BLD AUTO: 28 %
MCH RBC QN AUTO: 31.6 PG (ref 26.5–33)
MCHC RBC AUTO-ENTMCNC: 34.1 G/DL (ref 31.5–36.5)
MCV RBC AUTO: 93 FL (ref 78–100)
MONOCYTES # BLD AUTO: 0.7 10E3/UL (ref 0–1.3)
MONOCYTES NFR BLD AUTO: 13 %
NEUTROPHILS # BLD AUTO: 2.7 10E3/UL (ref 1.6–8.3)
NEUTROPHILS NFR BLD AUTO: 52 %
NRBC # BLD AUTO: 0 10E3/UL
NRBC BLD AUTO-RTO: 0 /100
PLATELET # BLD AUTO: 254 10E3/UL (ref 150–450)
POTASSIUM SERPL-SCNC: 4.6 MMOL/L (ref 3.4–5.3)
PROT SERPL-MCNC: 6.9 G/DL (ref 6.4–8.3)
RBC # BLD AUTO: 5.32 10E6/UL (ref 4.4–5.9)
SODIUM SERPL-SCNC: 140 MMOL/L (ref 135–145)
WBC # BLD AUTO: 5.1 10E3/UL (ref 4–11)

## 2025-06-03 PROCEDURE — 99215 OFFICE O/P EST HI 40 MIN: CPT | Performed by: INTERNAL MEDICINE

## 2025-06-03 PROCEDURE — 96367 TX/PROPH/DG ADDL SEQ IV INF: CPT

## 2025-06-03 PROCEDURE — 96413 CHEMO IV INFUSION 1 HR: CPT

## 2025-06-03 PROCEDURE — 84132 ASSAY OF SERUM POTASSIUM: CPT | Performed by: INTERNAL MEDICINE

## 2025-06-03 PROCEDURE — 85014 HEMATOCRIT: CPT | Performed by: INTERNAL MEDICINE

## 2025-06-03 PROCEDURE — G2211 COMPLEX E/M VISIT ADD ON: HCPCS | Performed by: INTERNAL MEDICINE

## 2025-06-03 PROCEDURE — 36415 COLL VENOUS BLD VENIPUNCTURE: CPT | Performed by: INTERNAL MEDICINE

## 2025-06-03 PROCEDURE — 250N000011 HC RX IP 250 OP 636: Performed by: INTERNAL MEDICINE

## 2025-06-03 PROCEDURE — 82310 ASSAY OF CALCIUM: CPT | Performed by: INTERNAL MEDICINE

## 2025-06-03 PROCEDURE — 96375 TX/PRO/DX INJ NEW DRUG ADDON: CPT

## 2025-06-03 PROCEDURE — 85025 COMPLETE CBC W/AUTO DIFF WBC: CPT | Performed by: INTERNAL MEDICINE

## 2025-06-03 PROCEDURE — 96415 CHEMO IV INFUSION ADDL HR: CPT

## 2025-06-03 PROCEDURE — 99213 OFFICE O/P EST LOW 20 MIN: CPT | Performed by: INTERNAL MEDICINE

## 2025-06-03 PROCEDURE — 82378 CARCINOEMBRYONIC ANTIGEN: CPT | Performed by: INTERNAL MEDICINE

## 2025-06-03 PROCEDURE — 258N000003 HC RX IP 258 OP 636: Performed by: INTERNAL MEDICINE

## 2025-06-03 RX ORDER — HEPARIN SODIUM (PORCINE) LOCK FLUSH IV SOLN 100 UNIT/ML 100 UNIT/ML
5 SOLUTION INTRAVENOUS
OUTPATIENT
Start: 2025-06-24

## 2025-06-03 RX ORDER — DIPHENHYDRAMINE HYDROCHLORIDE 50 MG/ML
25 INJECTION, SOLUTION INTRAMUSCULAR; INTRAVENOUS
Start: 2025-06-24

## 2025-06-03 RX ORDER — ONDANSETRON 2 MG/ML
8 INJECTION INTRAMUSCULAR; INTRAVENOUS ONCE
Status: COMPLETED | OUTPATIENT
Start: 2025-06-03 | End: 2025-06-03

## 2025-06-03 RX ORDER — ALBUTEROL SULFATE 0.83 MG/ML
2.5 SOLUTION RESPIRATORY (INHALATION)
OUTPATIENT
Start: 2025-06-24

## 2025-06-03 RX ORDER — HEPARIN SODIUM,PORCINE 10 UNIT/ML
5-20 VIAL (ML) INTRAVENOUS DAILY PRN
OUTPATIENT
Start: 2025-06-24

## 2025-06-03 RX ORDER — METHYLPREDNISOLONE SODIUM SUCCINATE 40 MG/ML
40 INJECTION INTRAMUSCULAR; INTRAVENOUS
Start: 2025-06-24

## 2025-06-03 RX ORDER — LORAZEPAM 2 MG/ML
0.5 INJECTION INTRAMUSCULAR EVERY 4 HOURS PRN
OUTPATIENT
Start: 2025-06-24

## 2025-06-03 RX ORDER — EPINEPHRINE 1 MG/ML
0.3 INJECTION, SOLUTION INTRAMUSCULAR; SUBCUTANEOUS EVERY 5 MIN PRN
OUTPATIENT
Start: 2025-06-24

## 2025-06-03 RX ORDER — ONDANSETRON 2 MG/ML
8 INJECTION INTRAMUSCULAR; INTRAVENOUS ONCE
OUTPATIENT
Start: 2025-06-24

## 2025-06-03 RX ORDER — MEPERIDINE HYDROCHLORIDE 25 MG/ML
25 INJECTION INTRAMUSCULAR; INTRAVENOUS; SUBCUTANEOUS
OUTPATIENT
Start: 2025-06-24

## 2025-06-03 RX ORDER — ALBUTEROL SULFATE 90 UG/1
1-2 INHALANT RESPIRATORY (INHALATION)
Start: 2025-06-24

## 2025-06-03 RX ORDER — DIPHENHYDRAMINE HYDROCHLORIDE 50 MG/ML
50 INJECTION, SOLUTION INTRAMUSCULAR; INTRAVENOUS
Start: 2025-06-24

## 2025-06-03 RX ADMIN — DEXTROSE 250 ML: 5 SOLUTION INTRAVENOUS at 09:11

## 2025-06-03 RX ADMIN — FOSAPREPITANT: 150 INJECTION, POWDER, LYOPHILIZED, FOR SOLUTION INTRAVENOUS at 09:16

## 2025-06-03 RX ADMIN — OXALIPLATIN 300 MG: 5 INJECTION, SOLUTION INTRAVENOUS at 09:48

## 2025-06-03 RX ADMIN — ONDANSETRON 8 MG: 2 INJECTION INTRAMUSCULAR; INTRAVENOUS at 09:12

## 2025-06-03 ASSESSMENT — PAIN SCALES - GENERAL: PAINLEVEL_OUTOF10: NO PAIN (0)

## 2025-06-03 NOTE — PROGRESS NOTES
Take Home Medication Teaching    Patient was educated on the following oral medications: dexamethasone, ondansetron, prochlorperazine, and loperamide on Anca 3, 2025. Teaching provided to patient included indication, dose, administration, adverse effects and side effect management. Written materials were provided and patient was given the opportunity to ask questions. Patient verbalized understanding of the information presented.     Moshe Melendez, PharmD, [facility]  Hematology/Oncology Clinical Pharmacist  [facility]  [phone]

## 2025-06-03 NOTE — PROGRESS NOTES
[facility] Clinical Trial [id number]: Met with patient to review consent for clinical trial [id number]/COSMIC: Complementary Options for Symptom Management In Cancer (COSMIC): Assessing Benefits and Harms of Cannabis and Cannabinoid Use Among a Cohort of Cancer Patients Treated in Community [facility]. Consent for [id number] was reviewed in its entirety, patients' questions answered to the best of writer's ability. Patient signed consent. Copy of consent was provided to the patient.

## 2025-06-03 NOTE — PROGRESS NOTES
Infusion Nursing Note:  Eric Mandel presents today for C1D1 Oxaliplatin.    Patient seen by provider today: Yes: Dr. Galvin   present during visit today: Not Applicable.    Note: Went over side effects and oriented to infusion center at [facility]. Patient complained throughout of pins and needles in fingers/arm and muscle cramping, gave hot packs with PIV oxaliplatin. Patient also complained of lump in throat after drinking cool water, reiterated to patient to avoid cool/cold water during and after infusions. Writer instructed to call the triage nurse at your clinic or seek medical attention if you have chills and/or temperature greater than or equal to 100.5, uncontrolled nausea/vomiting, diarrhea, constipation, dizziness, shortness of breath, chest pain, heart palpitations, weakness or any other new or concerning symptoms, questions or concerns.    Intravenous Access:  Peripheral IV placed.    Treatment Conditions:  Lab Results   Component Value Date    HGB 16.8 06/03/2025    WBC 5.1 06/03/2025    ANEU 2.7 06/03/2025     06/03/2025        Lab Results   Component Value Date     06/03/2025    POTASSIUM 4.6 06/03/2025    MAG 1.9 04/18/2025    CR 1.14 06/03/2025    FIONA 9.4 06/03/2025    BILITOTAL 0.5 06/03/2025    ALBUMIN 4.4 06/03/2025    ALT 19 06/03/2025    AST 22 06/03/2025       Results reviewed, labs MET treatment parameters, ok to proceed with treatment.      Post Infusion Assessment:  Patient tolerated infusion without incident.  Blood return noted pre and post infusion.  Site patent and intact, free from redness, edema or discomfort.  No evidence of extravasations.  Access discontinued per protocol.       Discharge Plan:   Patient declined prescription refills.  Discharge instructions reviewed with: Patient.  Patient and/or family verbalized understanding of discharge instructions and all questions answered.  AVS to patient via [employer].    Patient discharged in stable condition accompanied  by: self and wife.  Departure Mode: Ambulatory.      Jc Dominguez RN

## 2025-06-03 NOTE — PROGRESS NOTES
ONCOLOGY HISTORY: Mr. 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  MMR intact.    1.  Screening colonoscopy on 03/11/2025 revealed polyp in distal transverse colon and rectosigmoid colon.  - Transverse colon polyp is tubular adenoma, no evidence of high-grade dysplasia or malignancy.  - Rectosigmoid polyp revealed moderately differentiated adenocarcinoma, arising in tubular villous adenoma with high-grade dysplasia, invasive into submucosa with positive deep margin.  MMR intact.  2.  CT chest, abdomen and pelvis on 03/20/2025 did not reveal any evidence of metastatic disease.  There is liver segment 7 lesion most likely cavernous hemangioma.  There is enlarged multinodular thyroid nodule.  - CEA on 03/20/2025 is normal at 0.9.  - MRI of the liver in 04/02/25 revealed benign cavernous hemangiomas.  3. Thyroid ultrasound 03/20/2025 revealed thyroid nodules.  - Ultrasound-guided right thyroid FNA on 03/27/2025 is benign, consistent with follicular nodular disease.  3.  On 04/17/2025, patient had low anterior resection.  No intra-abdominal metastasis.  - Pathology reveals 0.5 cm moderately differentiated adenocarcinoma within polypectomy scar.  There is lymphovascular invasion.  3 of 13 mesenteric lymph nodes are positive for malignancy.  pT1 pN1b.     Subjective:  Mr. 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  Patient is status post low anterior resection.    PET scan was done on 05/28/2025:   1. No metabolic evidence of active rectosigmoid neoplasm.   2. FDG avid low-density lesion in the posterior left submandibular gland measuring up to 1.7 cm. While this could represent a primary salivary gland neoplasm without metastasis, the exact etiology remains indeterminate. This is amenable to ultrasound-guided histologic sampling if desired.    Patient says that he has a lump in the left neck area for 25 years.  It  has not gotten bigger.  Does not bother him.    Patient is doing well. No headache.  No dizziness.  No excessive fatigue.  No chest pain.  No shortness of breath.  No abdominal pain.  No nausea or vomiting.  No urinary complaints.  No bowel problem.  No bleeding.  No neuropathy.     He plays guitar semiprofessionally.      Exam:  Patient is alert and oriented x 3.  Not in any distress.  Vitals: Reviewed.  Rest of system not examined.     Labs: Reviewed.     Assessment:  1.  A 44-year-old gentleman with stage IIIA rectosigmoid adenocarcinoma.  2.  Low-density lesion in left submandibular gland.       Plan:  -Start chemotherapy.  -US of neck in next few days.  -See me with next chemotherapy.  -See genetic counselor as scheduled.     Discussion:  1.  Patient is doing well.  PET scan was reviewed with the patient and his wife.  Images shown to them.  No evidence of malignancy.    There is low-density lesion in posterior left submandibular gland.  Patient says that he has this lump for 25 years. It has not gotten bigger. It does not bother him.    For further evaluation, will get ultrasound of the neck.  If needed, will get an FNA.    2.  Discussed regarding his stage III rectosigmoid adenocarcinoma.  He will be starting adjuvant chemotherapy with Xeloda and oxaliplatin.  We discussed regarding duration of treatment.  Will give him 4 cycles of Xeloda and oxaliplatin.  After that we will consider additional 3 months of single agent Xeloda.  He is agreeable with this plan.    Side effect of Xeloda and oxaliplatin reviewed.  Advised him to let us if he gets any significant side effects.    Discussed regarding neuropathy.  He will let us know if it is starts bothering him.  He does play guitar and it might interfere with it.    3.  Labs were reviewed.  They are essentially normal.    4.  He had few questions which were all answered.  He will be seen with next cycle.      Total visit time of 40 minutes.  Time spent in today's  visit, review of chart/investigations today, discussing regarding high risk drugs and documentation today.

## 2025-06-03 NOTE — PROGRESS NOTES
Oral Chemotherapy Monitoring Program  Lab Follow Up    Reviewed lab results from today 6/3.        5/23/2025    11:00 AM 6/3/2025     9:00 AM   ORAL CHEMOTHERAPY   Assessment Type Initial Work up;New Teach Lab Monitoring;Chart Review   Diagnosis Code Colon Cancer Colon Cancer   Providers Kamar Galvin   Clinic Name/[facility]   Drug Name Xeloda (capecitabine) Xeloda (capecitabine)   Dose 2,000 mg  2,000 mg   Current Schedule BID BID   Cycle Details 2 weeks on, 1 week off 2 weeks on, 1 week off   Start Date of Last Cycle  6/3/2025   Planned next cycle start date 6/3/2025    Any new drug interactions?  No   Is the dose as ordered appropriate for the patient?  Yes       Data saved with a previous flowsheet row definition       Labs:  _  Result Component Current Result Ref Range   Sodium 140 (6/3/2025) 135 - 145 mmol/L     _  Result Component Current Result Ref Range   Potassium 4.6 (6/3/2025) 3.4 - 5.3 mmol/L     _  Result Component Current Result Ref Range   Calcium 9.4 (6/3/2025) 8.8 - 10.4 mg/dL     No results found for Mag within last 30 days.     No results found for Phos within last 30 days.     _  Result Component Current Result Ref Range   Albumin 4.4 (6/3/2025) 3.5 - 5.2 g/dL     _  Result Component Current Result Ref Range   Urea Nitrogen 16.7 (6/3/2025) 6.0 - 20.0 mg/dL     _  Result Component Current Result Ref Range   Creatinine 1.14 (6/3/2025) 0.67 - 1.17 mg/dL     _  Result Component Current Result Ref Range   AST 22 (6/3/2025) 0 - 45 U/L     _  Result Component Current Result Ref Range   ALT 19 (6/3/2025) 0 - 70 U/L     _  Result Component Current Result Ref Range   Bilirubin Total 0.5 (6/3/2025) <=1.2 mg/dL     _  Result Component Current Result Ref Range   WBC Count 5.1 (6/3/2025) 4.0 - 11.0 10e3/uL     _  Result Component Current Result Ref Range   Hemoglobin 16.8 (6/3/2025) 13.3 - 17.7 g/dL     _  Result Component Current Result Ref Range   Platelet Count 254 (6/3/2025) 150 - 450  10e3/uL     _  Result Component Current Result Ref Range   Absolute Neutrophils 2.7 (6/3/2025) 1.6 - 8.3 10e3/uL       Assessment & Plan:  Baseline labs reviewed, patient ok to start Capecitabine 2000 mg twice daily and take for 14 days on and then take 7 days off. Continue lab monitoring CMP and CBC every 3 weeks likely will align with infusion appts for now.     Follow-Up:  6/10: 1 week follow up assessment    Alek Benavides PharmD  [facility]  [phone]  Anca 3, 2025

## 2025-06-03 NOTE — LETTER
"6/3/2025      Eric Mandel  [address]  [place] [address]      Dear Colleague,    Thank you for referring your patient, Eric Mandel, to the [facility]. Please see a copy of my visit note below.    Oncology Rooming Note    Anca 3, 2025 8:12 AM   Eric Mandel is a 44 year old male who presents for:    Chief Complaint   Patient presents with     Oncology Clinic Visit     Initial Vitals: /87   Pulse 77   Temp 97.6  F (36.4  C) (Oral)   Resp 16   Ht 1.905 m (6' 3\")   Wt 90.3 kg (199 lb)   SpO2 98%   BMI 24.87 kg/m   Estimated body mass index is 24.87 kg/m  as calculated from the following:    Height as of this encounter: 1.905 m (6' 3\").    Weight as of this encounter: 90.3 kg (199 lb). Body surface area is 2.19 meters squared.  No Pain (0) Comment: Data Unavailable   No LMP for male patient.  Allergies reviewed: Yes  Medications reviewed: Yes    Medications: Medication refills not needed today.  Pharmacy name entered into [employer]:    [id number] - [place], [place] - [address] [facility] [address] & [facility] - [place], [place] - [address] AT [place] [facility] - [place], [place] - [address]     Frailty Screening:   Is the patient here for a new oncology consult visit in cancer care? 2. No    PHQ9:  Did this patient require a PHQ9?: No        Candi Cm MA              ONCOLOGY HISTORY: Mr. 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  MMR intact.    1.  Screening colonoscopy on 03/11/2025 revealed polyp in distal transverse colon and rectosigmoid colon.  - Transverse colon polyp is tubular adenoma, no evidence of high-grade dysplasia or malignancy.  - Rectosigmoid polyp revealed moderately differentiated adenocarcinoma, arising in tubular villous adenoma with high-grade dysplasia, invasive into submucosa with positive deep margin.  MMR intact.  2.  CT " chest, abdomen and pelvis on 03/20/2025 did not reveal any evidence of metastatic disease.  There is liver segment 7 lesion most likely cavernous hemangioma.  There is enlarged multinodular thyroid nodule.  - CEA on 03/20/2025 is normal at 0.9.  - MRI of the liver in 04/02/25 revealed benign cavernous hemangiomas.  3. Thyroid ultrasound 03/20/2025 revealed thyroid nodules.  - Ultrasound-guided right thyroid FNA on 03/27/2025 is benign, consistent with follicular nodular disease.  3.  On 04/17/2025, patient had low anterior resection.  No intra-abdominal metastasis.  - Pathology reveals 0.5 cm moderately differentiated adenocarcinoma within polypectomy scar.  There is lymphovascular invasion.  3 of 13 mesenteric lymph nodes are positive for malignancy.  pT1 pN1b.     Subjective:  Mr. 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  Patient is status post low anterior resection.    PET scan was done on 05/28/2025:   1. No metabolic evidence of active rectosigmoid neoplasm.   2. FDG avid low-density lesion in the posterior left submandibular gland measuring up to 1.7 cm. While this could represent a primary salivary gland neoplasm without metastasis, the exact etiology remains indeterminate. This is amenable to ultrasound-guided histologic sampling if desired.    Patient says that he has a lump in the left neck area for 25 years.  It has not gotten bigger.  Does not bother him.    Patient is doing well. No headache.  No dizziness.  No excessive fatigue.  No chest pain.  No shortness of breath.  No abdominal pain.  No nausea or vomiting.  No urinary complaints.  No bowel problem.  No bleeding.  No neuropathy.     He plays guitar semiprofessionally.      Exam:  Patient is alert and oriented x 3.  Not in any distress.  Vitals: Reviewed.  Rest of system not examined.     Labs: Reviewed.     Assessment:  1.  A 44-year-old gentleman with stage IIIA rectosigmoid  adenocarcinoma.  2.  Low-density lesion in left submandibular gland.       Plan:  -Start chemotherapy.  -US of neck in next few days.  -See me with next chemotherapy.  -See genetic counselor as scheduled.     Discussion:  1.  Patient is doing well.  PET scan was reviewed with the patient and his wife.  Images shown to them.  No evidence of malignancy.    There is low-density lesion in posterior left submandibular gland.  Patient says that he has this lump for 25 years. It has not gotten bigger. It does not bother him.    For further evaluation, will get ultrasound of the neck.  If needed, will get an FNA.    2.  Discussed regarding his stage III rectosigmoid adenocarcinoma.  He will be starting adjuvant chemotherapy with Xeloda and oxaliplatin.  We discussed regarding duration of treatment.  Will give him 4 cycles of Xeloda and oxaliplatin.  After that we will consider additional 3 months of single agent Xeloda.  He is agreeable with this plan.    Side effect of Xeloda and oxaliplatin reviewed.  Advised him to let us if he gets any significant side effects.    Discussed regarding neuropathy.  He will let us know if it is starts bothering him.  He does play guitar and it might interfere with it.    3.  Labs were reviewed.  They are essentially normal.    4.  He had few questions which were all answered.  He will be seen with next cycle.      Total visit time of 40 minutes.  Time spent in today's visit, review of chart/investigations today, discussing regarding high risk drugs and documentation today.          Again, thank you for allowing me to participate in the care of your patient.        Sincerely,        Dudley Galvin MD    Electronically signed

## 2025-06-03 NOTE — PROGRESS NOTES
"Oncology Rooming Note    Anca 3, 2025 8:12 AM   Eric Mandel is a 44 year old male who presents for:    Chief Complaint   Patient presents with    Oncology Clinic Visit     Initial Vitals: /87   Pulse 77   Temp 97.6  F (36.4  C) (Oral)   Resp 16   Ht 1.905 m (6' 3\")   Wt 90.3 kg (199 lb)   SpO2 98%   BMI 24.87 kg/m   Estimated body mass index is 24.87 kg/m  as calculated from the following:    Height as of this encounter: 1.905 m (6' 3\").    Weight as of this encounter: 90.3 kg (199 lb). Body surface area is 2.19 meters squared.  No Pain (0) Comment: Data Unavailable   No LMP for male patient.  Allergies reviewed: Yes  Medications reviewed: Yes    Medications: Medication refills not needed today.  Pharmacy name entered into [facility]:    [id number] - [facility] [address] AT [facility] [address] & [facility] - [place], [place] - [address] AT [facility] [facility] - [place], [place] - [address]     Frailty Screening:   Is the patient here for a new oncology consult visit in cancer care? 2. No    PHQ9:  Did this patient require a PHQ9?: No        Candi Cm MA            "

## 2025-06-03 NOTE — PROGRESS NOTES
Medical Assistant Note:  Eric Mandel presents today for blood draw.    Patient seen by provider today: Yes: claritza.   present during visit today: Not Applicable.    Concerns: No Concerns.    Procedure:  Lab draw site: lac, Needle type: bf, Gauge: 23.    Post Assessment:  Labs drawn without difficulty: Yes.    Discharge Plan:  Departure Mode: Ambulatory.    Face to Face Time: 5min  Harsha cbc, cmp, cea.    Marion Mcdonough CMA

## 2025-06-04 ENCOUNTER — PATIENT OUTREACH (OUTPATIENT)
Dept: ONCOLOGY | Facility: CLINIC | Age: 45
End: 2025-06-04
Payer: COMMERCIAL

## 2025-06-04 NOTE — PROGRESS NOTES
"[facility]: Cancer Care Follow-Up Note                                    Discussion with Patient:                                                      Writer called and spoke with patient to follow up with patient after receiving C1D1 of treatment .      No additional concerns at this time.       Dates of Treatment:                                                      Infusion given in last 28 days       Administered MAR Action Medication Dose Rate Visit    06/03/2025 09:48 New Bag oxaliplatin (ELOXATIN) 300 mg in D5W 610 mL infusion 300 mg 305 mL/hr Infusion Therapy Visit on 06/03/2025 in [facility] [place]          Treatment Plan Medications       OP ONC Colorectal Cancer - Oxaliplatin / Capecitabine (XELOX, CAPOX) - EVERY 3 WEEKS       Take Home Medications       Medication Sig Start/End Day/Cycle Status    capecitabine (XELODA) 500 MG tablet Take 4 tablets (2,000 mg) by mouth 2 times daily for 14 days. <span hidden class=\"[email or web address]\"></span>Take on Days 1 through 14, then off for 7 days. S to S+14 Day 1, Cycle 2 - 6/24/2025 Signed                            Assessment:                                                      Patient has cold sensitivity needing warmer than room temperature water. He notes that the first bite when he eats that his jaw is painful but dissipates rapidly.       Intervention/Education provided during outreach:                                                         Patient to call/Dondet message with updates  Confirmed patient has clinic and triage numbers    Signature:  Eileen Sommers RN  "

## 2025-06-10 ENCOUNTER — MYC MEDICAL ADVICE (OUTPATIENT)
Dept: ONCOLOGY | Facility: CLINIC | Age: 45
End: 2025-06-10
Payer: COMMERCIAL

## 2025-06-10 ENCOUNTER — DOCUMENTATION ONLY (OUTPATIENT)
Dept: PHARMACY | Facility: CLINIC | Age: 45
End: 2025-06-10
Payer: COMMERCIAL

## 2025-06-10 ENCOUNTER — ANCILLARY PROCEDURE (OUTPATIENT)
Dept: ULTRASOUND IMAGING | Facility: CLINIC | Age: 45
End: 2025-06-10
Attending: INTERNAL MEDICINE
Payer: COMMERCIAL

## 2025-06-10 DIAGNOSIS — C19 CANCER OF RECTOSIGMOID (COLON) (H): Primary | ICD-10-CM

## 2025-06-10 DIAGNOSIS — C18.7 MALIGNANT NEOPLASM OF SIGMOID COLON (H): ICD-10-CM

## 2025-06-10 DIAGNOSIS — R22.1 LUMP IN NECK: ICD-10-CM

## 2025-06-10 PROCEDURE — 76536 US EXAM OF HEAD AND NECK: CPT

## 2025-06-10 NOTE — PROGRESS NOTES
Oral Chemotherapy Monitoring Program    Subjective/Objective:  Eric Mandel is a 44 year old male contacted by phone for a follow-up visit for oral chemotherapy. Eric states that capecitabine therapy is going well so far. He endorses a start date of 6/3, is adherent to the drug regimen, has not missed any doses in the past week, and has not started any new medications in the past week. Eric reports some neuropathy in his right arm and hands, overall fatigue, and some nausea. Eric reports that fatigue is manageable and does not interfere with daily activities. Nausea goes away with use of compazine and ondansetron. Eric is aware to call with any questions or further concerns.         5/23/2025    11:00 AM 6/3/2025     9:00 AM 6/10/2025    12:00 PM   ORAL CHEMOTHERAPY   Assessment Type Initial Work up;New Teach Lab Monitoring;Chart Review Initial Follow up   Diagnosis Code Colon Cancer Colon Cancer Colon Cancer   Providers Kamar Galvin   Clinic Name/Location [facility]   Drug Name Xeloda (capecitabine) Xeloda (capecitabine) Xeloda (capecitabine)   Dose 2,000 mg  2,000 mg 2,000 mg   Current Schedule BID BID BID   Cycle Details 2 weeks on, 1 week off 2 weeks on, 1 week off 2 weeks on, 1 week off   Start Date of Last Cycle  6/3/2025 6/3/2025   Planned next cycle start date 6/3/2025  6/24/2025   Doses missed in last 2 weeks   0   Adherence Assessment   Adherent   Adverse Effects   Fatigue;Nausea   Nausea   Grade 1   Pharmacist Intervention(nausea)   No   Fatigue   Grade 1   Pharmacist Intervention(fatigue)   No   Any new drug interactions?  No No   Is the dose as ordered appropriate for the patient?  Yes Yes       Data saved with a previous flowsheet row definition       Last PHQ-2 Score on record:       11/16/2022     8:25 AM 11/14/2022     3:39 PM   PHQ-2 ( 1999 [employer])   Q1: Little interest or pleasure in doing things 1 1   Q2: Feeling down, depressed or hopeless 1 1   PHQ-2 Score 2 2   Q1:  "Little interest or pleasure in doing things  Several days   Q2: Feeling down, depressed or hopeless  Several days   PHQ-2 Score  2       Vitals:  BP:   BP Readings from Last 1 Encounters:   06/03/25 130/83     Wt Readings from Last 1 Encounters:   06/03/25 90.3 kg (199 lb)     Estimated body surface area is 2.19 meters squared as calculated from the following:    Height as of 6/3/25: 1.905 m (6' 3\").    Weight as of 6/3/25: 90.3 kg (199 lb).    Labs:  _  Result Component Current Result Ref Range   Sodium 140 (6/3/2025) 135 - 145 mmol/L     _  Result Component Current Result Ref Range   Potassium 4.6 (6/3/2025) 3.4 - 5.3 mmol/L     _  Result Component Current Result Ref Range   Calcium 9.4 (6/3/2025) 8.8 - 10.4 mg/dL     No results found for Mag within last 30 days.     No results found for Phos within last 30 days.     _  Result Component Current Result Ref Range   Albumin 4.4 (6/3/2025) 3.5 - 5.2 g/dL     _  Result Component Current Result Ref Range   Urea Nitrogen 16.7 (6/3/2025) 6.0 - 20.0 mg/dL     _  Result Component Current Result Ref Range   Creatinine 1.14 (6/3/2025) 0.67 - 1.17 mg/dL     _  Result Component Current Result Ref Range   AST 22 (6/3/2025) 0 - 45 U/L     _  Result Component Current Result Ref Range   ALT 19 (6/3/2025) 0 - 70 U/L     _  Result Component Current Result Ref Range   Bilirubin Total 0.5 (6/3/2025) <=1.2 mg/dL     _  Result Component Current Result Ref Range   WBC Count 5.1 (6/3/2025) 4.0 - 11.0 10e3/uL     _  Result Component Current Result Ref Range   Hemoglobin 16.8 (6/3/2025) 13.3 - 17.7 g/dL     _  Result Component Current Result Ref Range   Platelet Count 254 (6/3/2025) 150 - 450 10e3/uL     _  Result Component Current Result Ref Range   Absolute Neutrophils 2.7 (6/3/2025) 1.6 - 8.3 10e3/uL     No results found for ANC within last 30 days.          Assessment/Plan:  Patient is OK to continue capecitabine therapy. 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 Will " continue to monitor with labs with infusion.     Follow-Up:  6/24: Galvin and labs    Refill Due:  6/24    Jeremiah Illiopolis  Pharmacy Intern   [facility]   [phone]

## 2025-06-11 ENCOUNTER — RESULTS FOLLOW-UP (OUTPATIENT)
Dept: ONCOLOGY | Facility: CLINIC | Age: 45
End: 2025-06-11

## 2025-06-11 DIAGNOSIS — R22.1 LUMP IN NECK: Primary | ICD-10-CM

## 2025-06-12 ENCOUNTER — MYC MEDICAL ADVICE (OUTPATIENT)
Dept: INTERVENTIONAL RADIOLOGY/VASCULAR | Facility: CLINIC | Age: 45
End: 2025-06-12
Payer: COMMERCIAL

## 2025-06-12 NOTE — CONSULTS
Outpatient Neuro Rad Referral  06/12/25    Referring Provider: Dr. Dudley Galvin  NR Referral Request: Left submandibular gland lesion biopsy  Recommendations/Plan:  Neuro radiology has approved the patient for an US guided FNA.  Surg path entered under referrer.    Case and imaging was reviewed with Carlos Triplett MD and Dr. Fernández.   NR recommendations also relayed Epic messaging (Dr. Dudley Galvin).    NR referral converted to procedure order.      Brief History:    Eric Mandel is a 44 year old male with history of rectosigmoid Ca s/p low anterior resection in 4/2025, hx of lymphadeopathy who NR is being asked to pursue a left submandibular gland lesion biopsy.    Patient had a PET CT on 5/28/25 for treatment planning and staging of malignant neoplasm of rectosigmoid junction. Patient was found to have a FDG avid low-density lesion in the posterior left submandibular gland measuring up to 1.7 cm. Dr. Galvin of oncology is requesting biopsy.     Pertinent Medications:    Current Outpatient Medications   Medication Sig Dispense Refill    capecitabine (XELODA) 500 MG tablet Take 4 tablets (2,000 mg) by mouth 2 times daily for 14 days. Take on Days 1 through 14, then off for 7 days. 112 tablet 0    dexAMETHasone (DECADRON) 4 MG tablet Take 2 tablets (8 mg) by mouth daily. Take for 2 days, starting the day after chemo. Take with food. 4 tablet 2    diphenhydrAMINE (BENADRYL) 25 MG tablet Take 25 mg by mouth nightly as needed for sleep.      loperamide (IMODIUM) 2 MG capsule Start with 2 caps (4 mg), then take one cap (2 mg) after each diarrheal stool as needed. Do not use more than 8 caps (16 mg) per day. 30 capsule 0    melatonin 5 MG tablet Take 1-2 tablets by mouth nightly as needed for sleep.      Multiple Vitamin (MULTIVITAMIN ADULT) TABS Take 1 tablet by mouth daily.      ondansetron (ZOFRAN) 8 MG tablet Take 1 tablet (8 mg) by mouth every 8 hours as needed for nausea (vomiting). 30 tablet 2    oxyCODONE  "(ROXICODONE) 5 MG tablet Take 1 tablet (5 mg) by mouth every 6 hours as needed for moderate pain. 15 tablet 0    prochlorperazine (COMPAZINE) 10 MG tablet Take 1 tablet (10 mg) by mouth every 6 hours as needed for nausea or vomiting. 30 tablet 2     No current facility-administered medications for this visit.       Pertinent Imaging Reviewed:    PET oncology 5/28/25  US head neck soft tissue 6/10/25  Most Recent Labs:  Lab Results   Component Value Date    WBC 5.1 06/03/2025    WBC 5.7 02/14/2017     Lab Results   Component Value Date    RBC 5.32 06/03/2025    RBC 5.17 02/14/2017     Lab Results   Component Value Date    HGB 16.8 06/03/2025    HGB 16.3 02/14/2017     Lab Results   Component Value Date    HCT 49.2 06/03/2025    HCT 46.5 02/14/2017     Lab Results   Component Value Date     06/03/2025     02/14/2017    Last Comprehensive Metabolic Panel:  Lab Results   Component Value Date     06/03/2025    POTASSIUM 4.6 06/03/2025    CHLORIDE 103 06/03/2025    CO2 29 06/03/2025    ANIONGAP 8 06/03/2025    GLC 83 06/03/2025    BUN 16.7 06/03/2025    CR 1.14 06/03/2025    GFRESTIMATED 81 06/03/2025    FIONA 9.4 06/03/2025      No results found for: \"INR\"       If requesting team would like sample sent for anything else please use the IR order set \"IR RAD Biopsy or Fluid Aspirate Specimens\" to select your necessary diagnostic labs and pend for admission.     If there are labs you desire that are not found in this order set or you have questions regarding specific diagnostic labs please call the associated lab personnel.     What is the reason for the IR order request? Biopsy   What type of biopsy is needed? Lymph Node   Location of the Lymph Node? left submandibular gland lesion   Patients clinical information/history? lump   Is this biopsy procedure for research? No   Specimen orders should be placed per site protocol Acknowledge   Is there a specific location the exam needs to be scheduled at? No " specific location required   Call Back # [phone]   Is the patient on aspirin, Plavix or blood thinners? No          Yamini Osman PA-C  Interventional Radiology   [phone] (IR RN triage)

## 2025-06-12 NOTE — PROGRESS NOTES
US reveals 2.1 x 2 x 1.7 cm heterogeneous lesion within the left submandibular gland . Will get biopsy.

## 2025-06-13 ENCOUNTER — HOSPITAL ENCOUNTER (OUTPATIENT)
Facility: CLINIC | Age: 45
Discharge: HOME | End: 2025-06-13
Admitting: STUDENT IN AN ORGANIZED HEALTH CARE EDUCATION/TRAINING PROGRAM
Payer: COMMERCIAL

## 2025-06-13 ENCOUNTER — APPOINTMENT (OUTPATIENT)
Dept: INTERVENTIONAL RADIOLOGY/VASCULAR | Facility: CLINIC | Age: 45
End: 2025-06-13
Attending: INTERNAL MEDICINE
Payer: COMMERCIAL

## 2025-06-13 VITALS
HEIGHT: 75 IN | RESPIRATION RATE: 16 BRPM | BODY MASS INDEX: 23.5 KG/M2 | DIASTOLIC BLOOD PRESSURE: 73 MMHG | HEART RATE: 71 BPM | WEIGHT: 189 LBS | TEMPERATURE: 97.8 F | SYSTOLIC BLOOD PRESSURE: 117 MMHG | OXYGEN SATURATION: 97 %

## 2025-06-13 DIAGNOSIS — C19 CANCER OF RECTOSIGMOID (COLON) (H): ICD-10-CM

## 2025-06-13 LAB
ERYTHROCYTE [DISTWIDTH] IN BLOOD BY AUTOMATED COUNT: 12.3 % (ref 10–15)
HCT VFR BLD AUTO: 42.8 % (ref 40–53)
HGB BLD-MCNC: 15.5 G/DL (ref 13.3–17.7)
MCH RBC QN AUTO: 31.7 PG (ref 26.5–33)
MCHC RBC AUTO-ENTMCNC: 36.2 G/DL (ref 31.5–36.5)
MCV RBC AUTO: 88 FL (ref 78–100)
PLATELET # BLD AUTO: 259 10E3/UL (ref 150–450)
RBC # BLD AUTO: 4.89 10E6/UL (ref 4.4–5.9)
WBC # BLD AUTO: 5.3 10E3/UL (ref 4–11)

## 2025-06-13 PROCEDURE — 272N000196 HC ACCESSORY CR5

## 2025-06-13 PROCEDURE — 250N000011 HC RX IP 250 OP 636: Performed by: STUDENT IN AN ORGANIZED HEALTH CARE EDUCATION/TRAINING PROGRAM

## 2025-06-13 PROCEDURE — 999N000163 HC STATISTIC SIMPLE TUBE INSERTION/CHARGE, PORT, CATH, FISTULOGRAM

## 2025-06-13 PROCEDURE — 250N000011 HC RX IP 250 OP 636: Performed by: NURSE PRACTITIONER

## 2025-06-13 PROCEDURE — 99152 MOD SED SAME PHYS/QHP 5/>YRS: CPT

## 2025-06-13 PROCEDURE — 36415 COLL VENOUS BLD VENIPUNCTURE: CPT | Performed by: NURSE PRACTITIONER

## 2025-06-13 PROCEDURE — 85018 HEMOGLOBIN: CPT | Performed by: NURSE PRACTITIONER

## 2025-06-13 PROCEDURE — C1788 PORT, INDWELLING, IMP: HCPCS

## 2025-06-13 RX ORDER — NALOXONE HYDROCHLORIDE 0.4 MG/ML
0.4 INJECTION, SOLUTION INTRAMUSCULAR; INTRAVENOUS; SUBCUTANEOUS
Status: DISCONTINUED | OUTPATIENT
Start: 2025-06-13 | End: 2025-06-13 | Stop reason: HOSPADM

## 2025-06-13 RX ORDER — ACETAMINOPHEN 325 MG/1
650 TABLET ORAL
Status: DISCONTINUED | OUTPATIENT
Start: 2025-06-13 | End: 2025-06-13 | Stop reason: HOSPADM

## 2025-06-13 RX ORDER — HEPARIN SODIUM (PORCINE) LOCK FLUSH IV SOLN 100 UNIT/ML 100 UNIT/ML
500 SOLUTION INTRAVENOUS ONCE
Status: COMPLETED | OUTPATIENT
Start: 2025-06-13 | End: 2025-06-13

## 2025-06-13 RX ORDER — NALOXONE HYDROCHLORIDE 0.4 MG/ML
0.2 INJECTION, SOLUTION INTRAMUSCULAR; INTRAVENOUS; SUBCUTANEOUS
Status: DISCONTINUED | OUTPATIENT
Start: 2025-06-13 | End: 2025-06-13 | Stop reason: HOSPADM

## 2025-06-13 RX ORDER — FENTANYL CITRATE 50 UG/ML
25-50 INJECTION, SOLUTION INTRAMUSCULAR; INTRAVENOUS EVERY 5 MIN PRN
Status: DISCONTINUED | OUTPATIENT
Start: 2025-06-13 | End: 2025-06-13 | Stop reason: HOSPADM

## 2025-06-13 RX ORDER — FLUMAZENIL 0.1 MG/ML
0.2 INJECTION, SOLUTION INTRAVENOUS
Status: DISCONTINUED | OUTPATIENT
Start: 2025-06-13 | End: 2025-06-13 | Stop reason: HOSPADM

## 2025-06-13 RX ORDER — CEFAZOLIN SODIUM 2 G/50ML
2 SOLUTION INTRAVENOUS
Status: COMPLETED | OUTPATIENT
Start: 2025-06-13 | End: 2025-06-13

## 2025-06-13 RX ADMIN — LIDOCAINE HYDROCHLORIDE 20 ML: 10; .005 INJECTION, SOLUTION EPIDURAL; INFILTRATION; INTRACAUDAL; PERINEURAL at 08:17

## 2025-06-13 RX ADMIN — FENTANYL CITRATE 50 MCG: 50 INJECTION, SOLUTION INTRAMUSCULAR; INTRAVENOUS at 08:15

## 2025-06-13 RX ADMIN — HEPARIN SODIUM (PORCINE) LOCK FLUSH IV SOLN 100 UNIT/ML 500 UNITS: 100 SOLUTION at 08:23

## 2025-06-13 RX ADMIN — FENTANYL CITRATE 50 MCG: 50 INJECTION, SOLUTION INTRAMUSCULAR; INTRAVENOUS at 08:12

## 2025-06-13 RX ADMIN — MIDAZOLAM 2 MG: 1 INJECTION INTRAMUSCULAR; INTRAVENOUS at 08:07

## 2025-06-13 RX ADMIN — CEFAZOLIN SODIUM 2 G: 2 SOLUTION INTRAVENOUS at 07:42

## 2025-06-13 ASSESSMENT — ACTIVITIES OF DAILY LIVING (ADL)
ADLS_ACUITY_SCORE: 59

## 2025-06-13 NOTE — PROGRESS NOTES
Care Suites Post Procedure Note    Patient Information  Name: Eric Mandel  Age: 44 year old    Post Procedure  Time patient returned to Care Suites: 0842  Concerns/abnormal assessment: NA  If abnormal assessment, provider notified: N/A  Plan/Other: BR x 1 hour, port sites CDI/soft, denies pain.    Flaca Correa RN

## 2025-06-13 NOTE — IR NOTE
Procedure Note for IR Procedure Dictation  Procedure port  Conscious sedation: 2 mg IVP Versed, 100 mcg IVP fentanyl  Sedation time: 19 minutes  Fluoro time: 0.4 minutes  Total Fluoro Dose: 1.12 mGy (Air Kerma)  Contrast: 0 ml 0  Local anesthetic: 20 ml 1% lidocaine  w/ epi

## 2025-06-13 NOTE — IR NOTE
Interventional Radiology Intra-procedural Nursing Note    Patient Name: Eric Mandel  Medical Record Number: [id number]  Today's Date: June 13, 2025    Procedure consented for : port placement with moderate sedation  Start time: 812  End time: 831  Report provided to: Flaca MONTANO  Patient depart time and location: 845 to CS 21    Note: Patient entered Interventional Radiology Suite number 2 via cart. Patient awake, alert and oriented. Assisted onto procedural table in supine position. Prepped and draped.  Dr. Gomez in room. Time out and procedure started. Vital signs stable. Telemetry reading SR.    Procedure well tolerated by patient without complications. Procedure end with debrief by Dr. Gomez.  Pressure held until hemostasis achieved. Dermabond dressing applied to chest wall .        Administered medication totals:  Lidocaine with Epinephrine 20 mL Intradermal  Heparin 500 Units port  Versed 2 mg IVP  Fentanyl 100 mcg IVP    Last dose of sedation administered at 815.

## 2025-06-13 NOTE — DISCHARGE INSTRUCTIONS
Port Insertion Discharge Instructions     After you go home:    Have an adult stay with you for the first 6 hours  You may resume your normal diet       For 24 hours - due to the sedation you received:  Relax and take it easy  Do NOT make any important or legal decisions  Do NOT drive or operate machines at home or at work  Do NOT drink alcohol    Care of Incision sites:    You have a liquid adhesive covering on your incisions. Do not remove the adhesive residue. It will come off on it's own.  You may take a shower 24-48 hours after your procedure. Do not scrub site.  No submerging site for 2 weeks or until the incision is completely healed.  You may cover the wound with a bandaid if needed for comfort.      Activity     Avoid heavy lifting (greater than 10 pounds) or the overuse of your shoulder for 48-72 hrs.    Bleeding:    If you start bleeding from the incision sites in your chest or neck - or have swelling in your neck, sit down and press on the site for 5-10 minutes.   If bleeding has not stopped after 10 minutes, call your provider.        Call 911 right away if you have heavy bleeding or bleeding that does not stop.      Medicines:    You may resume all medications  Resume your Warfarin/Coumadin at your regular dose today. Follow up with your provider to have your INR rechecked  Resume your Platelet Inhibitors and Aspirin tomorrow at your regular dose  For minor pain, you may take Acetaminophen (Tylenol) or Ibuprofen (Advil)    Call the provider who ordered this procedure if:    You have swelling in your neck or over your port site  The incision area is red, swollen, hot or tender  You have chills or a fever greater than 101 F (38 C)  Any questions or concerns    Call  911 or go to the Emergency Room if you have:    Severe chest pain or trouble breathing  Bleeding that you cannot control    Additional Information:    Your port may be accessed right away.   You will need to have your port flushed every 30  days or after each use.      If you have questions call:        Interventional Radiology Intake Center @ [phone]    Mon - Fri  7:30 am - 4 pm          Calls will be returned on the next business day  If you need immediate assistance - please be seen   in an Urgent Care or Emergency Department    You may also contact your provider via My Chart

## 2025-06-13 NOTE — PRE-PROCEDURE
GENERAL PRE-PROCEDURE:   Procedure:  Port a catheter placement with moderate sedation  Date/Time:  6/13/2025 7:30 AM    Written consent obtained?: Yes    Risks and benefits: Risks, benefits and alternatives were discussed    Consent given by:  Patient  Patient states understanding of procedure being performed: Yes    Patient's understanding of procedure matches consent: Yes    Procedure consent matches procedure scheduled: Yes    Expected level of sedation:  Moderate  Appropriately NPO:  Yes  ASA Class:  3  Mallampati  :  Grade 2- soft palate, base of uvula, tonsillar pillars, and portion of posterior pharyngeal wall visible  Lungs:  Lungs clear with good breath sounds bilaterally  Heart:  Normal heart sounds and rate  History & Physical reviewed:  History and physical reviewed and no updates needed  Statement of review:  I have reviewed the lab findings, diagnostic data, medications, and the plan for sedation    Total time: 20 minutes    Thanks [facility] Interventional Radiology CNP ([phone]) (phone [phone])

## 2025-06-13 NOTE — PROGRESS NOTES
Care Suites Admission Nursing Note    Patient Information  Name: Eric Mandel  Age: 44 year old  Reason for admission: Port Placement  Care Suites arrival time: 0630    Visitor Information  Name: Astrid     Patient Admission/Assessment   Pre-procedure assessment complete: Yes  If abnormal assessment/labs, provider notified: N/A  NPO: Yes  Medications held per instructions/orders: N/A  Consent: deferred  If applicable, pregnancy test status: deferred  Patient oriented to room: Yes  Education/questions answered: Yes  Plan/other: proceed as scheduled    Discharge Planning  Discharge name/phone number: Astrid  Overnight post sedation caregiver: Astrid  Discharge location: home    Flaca Correa RN

## 2025-06-13 NOTE — PROCEDURES
[facility]    Procedure: IR Procedure Note    Date/Time: 6/13/2025 8:38 AM    Performed by: Jemal Gomez MD  Authorized by: Jemal Gomez MD  IR Fellow Physician:    Pre Procedure Diagnosis: Rectosigmoid cancer  Post Procedure Diagnosis: same    UNIVERSAL PROTOCOL   Site Marked: NA  Prior Images Obtained and Reviewed:  Yes  Required items: Required blood products, implants, devices and special equipment available    Patient identity confirmed:  Verbally with patient and arm band  Patient was reevaluated immediately before administering moderate or deep sedation or anesthesia  Confirmation Checklist:  Patient's identity using two indicators and relevant allergies  Time out: Immediately prior to the procedure a time out was called    Universal Protocol: the Joint Commission Universal Protocol was followed    Preparation: Patient was prepped and draped in usual sterile fashion      SEDATION  Patient Sedated: Yes    Vital signs: Vital signs monitored during sedation    Findings: Successful right chest port placement. Tip of the port is at the cavoatrial junction. Port is ready for immediate use.         PROCEDURE    Length of time physician/provider present for 1:1 monitoring during sedation:  0-22 min

## 2025-06-13 NOTE — PROGRESS NOTES
Care Suites Discharge Nursing Note    Patient Information  Name: Eric Mandel  Age: 44 year old    Discharge Education:  Discharge instructions reviewed: Yes  Additional education/resources provided: NA  Patient/patient representative verbalizes understanding: Yes  Patient discharging on new medications: No  Medication education completed: N/A    Discharge Plans:   Discharge location: home  Discharge ride contacted: Yes  Approximate discharge time: 1000    Discharge Criteria:  Discharge criteria met and vital signs stable: Yes. Port sites CDI/soft, denies pain, ambulated/voided/ate/PIV pulled, port book given to patient    Patient Belongs:  Patient belongings returned to patient: Yes    Flaca Correa RN

## 2025-06-17 ENCOUNTER — TELEPHONE (OUTPATIENT)
Dept: MEDSURG UNIT | Facility: CLINIC | Age: 45
End: 2025-06-17
Payer: COMMERCIAL

## 2025-06-18 DIAGNOSIS — C19 CANCER OF RECTOSIGMOID (COLON) (H): Primary | ICD-10-CM

## 2025-06-24 ENCOUNTER — INFUSION THERAPY VISIT (OUTPATIENT)
Dept: INFUSION THERAPY | Facility: CLINIC | Age: 45
End: 2025-06-24
Attending: INTERNAL MEDICINE
Payer: COMMERCIAL

## 2025-06-24 ENCOUNTER — LAB (OUTPATIENT)
Dept: INFUSION THERAPY | Facility: CLINIC | Age: 45
End: 2025-06-24
Attending: NURSE PRACTITIONER
Payer: COMMERCIAL

## 2025-06-24 ENCOUNTER — ONCOLOGY VISIT (OUTPATIENT)
Dept: ONCOLOGY | Facility: CLINIC | Age: 45
End: 2025-06-24
Attending: INTERNAL MEDICINE
Payer: COMMERCIAL

## 2025-06-24 VITALS
WEIGHT: 197.8 LBS | TEMPERATURE: 97.8 F | DIASTOLIC BLOOD PRESSURE: 69 MMHG | BODY MASS INDEX: 24.72 KG/M2 | OXYGEN SATURATION: 99 % | RESPIRATION RATE: 16 BRPM | HEART RATE: 73 BPM | SYSTOLIC BLOOD PRESSURE: 109 MMHG

## 2025-06-24 DIAGNOSIS — C19 CANCER OF RECTOSIGMOID (COLON) (H): Primary | ICD-10-CM

## 2025-06-24 DIAGNOSIS — C19 CANCER OF RECTOSIGMOID (COLON) (H): ICD-10-CM

## 2025-06-24 LAB
ALBUMIN SERPL BCG-MCNC: 4.4 G/DL (ref 3.5–5.2)
ALP SERPL-CCNC: 83 U/L (ref 40–150)
ALT SERPL W P-5'-P-CCNC: 45 U/L (ref 0–70)
ANION GAP SERPL CALCULATED.3IONS-SCNC: 11 MMOL/L (ref 7–15)
AST SERPL W P-5'-P-CCNC: 26 U/L (ref 0–45)
BASOPHILS # BLD AUTO: 0 10E3/UL (ref 0–0.2)
BASOPHILS NFR BLD AUTO: 1 %
BILIRUB SERPL-MCNC: 0.6 MG/DL
BUN SERPL-MCNC: 14 MG/DL (ref 6–20)
CALCIUM SERPL-MCNC: 9.8 MG/DL (ref 8.8–10.4)
CEA SERPL-MCNC: 1.6 NG/ML
CHLORIDE SERPL-SCNC: 102 MMOL/L (ref 98–107)
CREAT SERPL-MCNC: 1.22 MG/DL (ref 0.67–1.17)
EGFRCR SERPLBLD CKD-EPI 2021: 75 ML/MIN/1.73M2
EOSINOPHIL # BLD AUTO: 0.2 10E3/UL (ref 0–0.7)
EOSINOPHIL NFR BLD AUTO: 4 %
ERYTHROCYTE [DISTWIDTH] IN BLOOD BY AUTOMATED COUNT: 14.3 % (ref 10–15)
GLUCOSE SERPL-MCNC: 110 MG/DL (ref 70–99)
HCO3 SERPL-SCNC: 27 MMOL/L (ref 22–29)
HCT VFR BLD AUTO: 47.4 % (ref 40–53)
HGB BLD-MCNC: 16.3 G/DL (ref 13.3–17.7)
IMM GRANULOCYTES # BLD: 0 10E3/UL
IMM GRANULOCYTES NFR BLD: 0 %
LYMPHOCYTES # BLD AUTO: 1.2 10E3/UL (ref 0.8–5.3)
LYMPHOCYTES NFR BLD AUTO: 26 %
MCH RBC QN AUTO: 31.5 PG (ref 26.5–33)
MCHC RBC AUTO-ENTMCNC: 34.4 G/DL (ref 31.5–36.5)
MCV RBC AUTO: 92 FL (ref 78–100)
MONOCYTES # BLD AUTO: 0.8 10E3/UL (ref 0–1.3)
MONOCYTES NFR BLD AUTO: 18 %
NEUTROPHILS # BLD AUTO: 2.5 10E3/UL (ref 1.6–8.3)
NEUTROPHILS NFR BLD AUTO: 52 %
NRBC # BLD AUTO: 0 10E3/UL
NRBC BLD AUTO-RTO: 0 /100
PLATELET # BLD AUTO: 259 10E3/UL (ref 150–450)
POTASSIUM SERPL-SCNC: 4.8 MMOL/L (ref 3.4–5.3)
PROT SERPL-MCNC: 7 G/DL (ref 6.4–8.3)
RBC # BLD AUTO: 5.17 10E6/UL (ref 4.4–5.9)
SODIUM SERPL-SCNC: 140 MMOL/L (ref 135–145)
WBC # BLD AUTO: 4.8 10E3/UL (ref 4–11)

## 2025-06-24 PROCEDURE — 96375 TX/PRO/DX INJ NEW DRUG ADDON: CPT

## 2025-06-24 PROCEDURE — 250N000011 HC RX IP 250 OP 636: Performed by: INTERNAL MEDICINE

## 2025-06-24 PROCEDURE — 99214 OFFICE O/P EST MOD 30 MIN: CPT | Performed by: INTERNAL MEDICINE

## 2025-06-24 PROCEDURE — 96413 CHEMO IV INFUSION 1 HR: CPT

## 2025-06-24 PROCEDURE — 258N000003 HC RX IP 258 OP 636: Performed by: INTERNAL MEDICINE

## 2025-06-24 PROCEDURE — 96415 CHEMO IV INFUSION ADDL HR: CPT

## 2025-06-24 PROCEDURE — 82378 CARCINOEMBRYONIC ANTIGEN: CPT | Performed by: INTERNAL MEDICINE

## 2025-06-24 PROCEDURE — 85025 COMPLETE CBC W/AUTO DIFF WBC: CPT | Performed by: INTERNAL MEDICINE

## 2025-06-24 PROCEDURE — 99213 OFFICE O/P EST LOW 20 MIN: CPT | Performed by: INTERNAL MEDICINE

## 2025-06-24 PROCEDURE — 36415 COLL VENOUS BLD VENIPUNCTURE: CPT | Performed by: INTERNAL MEDICINE

## 2025-06-24 PROCEDURE — 96367 TX/PROPH/DG ADDL SEQ IV INF: CPT

## 2025-06-24 PROCEDURE — 80053 COMPREHEN METABOLIC PANEL: CPT | Performed by: INTERNAL MEDICINE

## 2025-06-24 RX ORDER — ONDANSETRON 2 MG/ML
8 INJECTION INTRAMUSCULAR; INTRAVENOUS ONCE
OUTPATIENT
Start: 2025-07-15

## 2025-06-24 RX ORDER — LORAZEPAM 2 MG/ML
0.5 INJECTION INTRAMUSCULAR EVERY 4 HOURS PRN
OUTPATIENT
Start: 2025-07-15

## 2025-06-24 RX ORDER — METHYLPREDNISOLONE SODIUM SUCCINATE 40 MG/ML
40 INJECTION INTRAMUSCULAR; INTRAVENOUS
Start: 2025-07-15

## 2025-06-24 RX ORDER — DIPHENHYDRAMINE HYDROCHLORIDE 50 MG/ML
25 INJECTION, SOLUTION INTRAMUSCULAR; INTRAVENOUS
Start: 2025-07-15

## 2025-06-24 RX ORDER — DIPHENHYDRAMINE HYDROCHLORIDE 50 MG/ML
50 INJECTION, SOLUTION INTRAMUSCULAR; INTRAVENOUS
Start: 2025-07-15

## 2025-06-24 RX ORDER — HEPARIN SODIUM (PORCINE) LOCK FLUSH IV SOLN 100 UNIT/ML 100 UNIT/ML
5 SOLUTION INTRAVENOUS
OUTPATIENT
Start: 2025-07-15

## 2025-06-24 RX ORDER — HEPARIN SODIUM (PORCINE) LOCK FLUSH IV SOLN 100 UNIT/ML 100 UNIT/ML
5 SOLUTION INTRAVENOUS
Status: DISCONTINUED | OUTPATIENT
Start: 2025-06-24 | End: 2025-06-24 | Stop reason: HOSPADM

## 2025-06-24 RX ORDER — EPINEPHRINE 1 MG/ML
0.3 INJECTION, SOLUTION INTRAMUSCULAR; SUBCUTANEOUS EVERY 5 MIN PRN
OUTPATIENT
Start: 2025-07-15

## 2025-06-24 RX ORDER — ALBUTEROL SULFATE 90 UG/1
1-2 INHALANT RESPIRATORY (INHALATION)
Start: 2025-07-15

## 2025-06-24 RX ORDER — ALBUTEROL SULFATE 0.83 MG/ML
2.5 SOLUTION RESPIRATORY (INHALATION)
OUTPATIENT
Start: 2025-07-15

## 2025-06-24 RX ORDER — DEXAMETHASONE 4 MG/1
8 TABLET ORAL DAILY
Qty: 12 TABLET | Refills: 0 | Status: SHIPPED | OUTPATIENT
Start: 2025-06-24

## 2025-06-24 RX ORDER — HEPARIN SODIUM,PORCINE 10 UNIT/ML
5-20 VIAL (ML) INTRAVENOUS DAILY PRN
OUTPATIENT
Start: 2025-07-15

## 2025-06-24 RX ORDER — ONDANSETRON 2 MG/ML
8 INJECTION INTRAMUSCULAR; INTRAVENOUS ONCE
Status: COMPLETED | OUTPATIENT
Start: 2025-06-24 | End: 2025-06-24

## 2025-06-24 RX ORDER — MEPERIDINE HYDROCHLORIDE 25 MG/ML
25 INJECTION INTRAMUSCULAR; INTRAVENOUS; SUBCUTANEOUS
OUTPATIENT
Start: 2025-07-15

## 2025-06-24 RX ADMIN — OXALIPLATIN 300 MG: 5 INJECTION, SOLUTION INTRAVENOUS at 10:08

## 2025-06-24 RX ADMIN — DEXTROSE 250 ML: 5 SOLUTION INTRAVENOUS at 09:41

## 2025-06-24 RX ADMIN — FOSAPREPITANT: 150 INJECTION, POWDER, LYOPHILIZED, FOR SOLUTION INTRAVENOUS at 09:41

## 2025-06-24 RX ADMIN — Medication 5 ML: at 12:14

## 2025-06-24 RX ADMIN — ONDANSETRON 8 MG: 2 INJECTION INTRAMUSCULAR; INTRAVENOUS at 09:41

## 2025-06-24 ASSESSMENT — PAIN SCALES - GENERAL: PAINLEVEL_OUTOF10: NO PAIN (0)

## 2025-06-24 NOTE — PROGRESS NOTES
Infusion Nursing Note:  Eric Mandel presents today for C2D1 oxaliplatin.    Patient seen by provider today: Yes: Dr. Galvin   present during visit today: Not Applicable.    Note: Patient had N/T hands and feet and cold sensitivities after last oxaliplatin and a lot of pain in the vein that oxaliplatin was infused in. A port was placed since.      Intravenous Access:  Implanted Port.    Treatment Conditions:  Lab Results   Component Value Date    HGB 16.3 06/24/2025    WBC 4.8 06/24/2025    ANEU 2.5 06/24/2025     06/24/2025        Lab Results   Component Value Date     06/24/2025    POTASSIUM 4.8 06/24/2025    MAG 1.9 04/18/2025    CR 1.22 (H) 06/24/2025    FIONA 9.8 06/24/2025    BILITOTAL 0.6 06/24/2025    ALBUMIN 4.4 06/24/2025    ALT 45 06/24/2025    AST 26 06/24/2025       Results reviewed, labs MET treatment parameters, ok to proceed with treatment.      Post Infusion Assessment:  Patient tolerated infusion without incident.  Blood return noted pre and post infusion.  Site patent and intact, free from redness, edema or discomfort.  No evidence of extravasations.  Access discontinued per protocol.       Discharge Plan:   Copy of AVS reviewed with patient and/or family.  Patient will return as prev rivka for next appointment.  Patient discharged in stable condition accompanied by: wife.  Departure Mode: Ambulatory.      Jose E Elkins RN

## 2025-06-24 NOTE — PROGRESS NOTES
Medical Assistant Note:  Eric Mandel presents today for blood draw.    Patient seen by provider today: Yes: claritza.   present during visit today: Not Applicable.    Concerns: No Concerns.    Procedure:  Lab draw site: lac, Needle type: bf, Gauge: 23.    Post Assessment:  Labs drawn without difficulty: Yes.    Discharge Plan:  Departure Mode: Ambulatory.    Face to Face Time: 5 min.    Marion Mcdonough, CMA

## 2025-06-24 NOTE — PROGRESS NOTES
"Oncology Rooming Note    June 24, 2025 8:05 AM   Eric Mandel is a 44 year old male who presents for:    Chief Complaint   Patient presents with    Oncology Clinic Visit     Initial Vitals: /69   Pulse 73   Temp 97.8  F (36.6  C) (Oral)   Resp 16   Wt 89.7 kg (197 lb 12.8 oz)   SpO2 99%   BMI 24.72 kg/m   Estimated body mass index is 24.72 kg/m  as calculated from the following:    Height as of 6/13/25: 1.905 m (6' 3\").    Weight as of this encounter: 89.7 kg (197 lb 12.8 oz). Body surface area is 2.18 meters squared.  No Pain (0) Comment: Data Unavailable   No LMP for male patient.  Allergies reviewed: Yes  Medications reviewed: Yes    Medications: Medication refills not needed today.  Pharmacy name entered into [facility]:    [id number] - [place], [place] - [address] AT [place] [address] & [facility] - [place], [place] - [address] AT [place] & [facility] - [place], [place] - [address]     Frailty Screening:   Is the patient here for a new oncology consult visit in cancer care? 2. No    PHQ9:  Did this patient require a PHQ9?: No      Clinical concerns:  doctor was notified.      Marion Mcdonough [facility]            "

## 2025-06-24 NOTE — LETTER
"6/24/2025      Eric Mandel  [address]  [place] [address]      Dear Colleague,    Thank you for referring your patient, Eric Mandel, to the [facility]. Please see a copy of my visit note below.    Oncology Rooming Note    June 24, 2025 8:05 AM   Eric Mandel is a 44 year old male who presents for:    Chief Complaint   Patient presents with     Oncology Clinic Visit     Initial Vitals: /69   Pulse 73   Temp 97.8  F (36.6  C) (Oral)   Resp 16   Wt 89.7 kg (197 lb 12.8 oz)   SpO2 99%   BMI 24.72 kg/m   Estimated body mass index is 24.72 kg/m  as calculated from the following:    Height as of 6/13/25: 1.905 m (6' 3\").    Weight as of this encounter: 89.7 kg (197 lb 12.8 oz). Body surface area is 2.18 meters squared.  No Pain (0) Comment: Data Unavailable   No LMP for male patient.  Allergies reviewed: Yes  Medications reviewed: Yes    Medications: Medication refills not needed today.  Pharmacy name entered into [employer]:    [id number] - [place], [place] - [address] AT [address] & [facility] - [place], [place] - [address] AT [place] & [facility] - [place], [place] - [address]     Frailty Screening:   Is the patient here for a new oncology consult visit in cancer care? 2. No    PHQ9:  Did this patient require a PHQ9?: No      Clinical concerns:  doctor was notified.      Marion Mcdonough, [facility]              ONCOLOGY HISTORY: Mr. 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  MMR intact.     1.  Screening colonoscopy on 03/11/2025 revealed polyp in distal transverse colon and rectosigmoid colon.  - Transverse colon polyp is tubular adenoma, no evidence of high-grade dysplasia or malignancy.  - Rectosigmoid polyp revealed moderately differentiated adenocarcinoma, arising in tubular villous adenoma with high-grade dysplasia, invasive into submucosa with positive deep " margin.  MMR intact.  2.  CT chest, abdomen and pelvis on 03/20/2025 did not reveal any evidence of metastatic disease.  There is liver segment 7 lesion most likely cavernous hemangioma.  There is enlarged multinodular thyroid nodule.  - CEA on 03/20/2025 is normal at 0.9.  - MRI of the liver in 04/02/25 revealed benign cavernous hemangiomas.  3. Thyroid ultrasound 03/20/2025 revealed thyroid nodules.  - Ultrasound-guided right thyroid FNA on 03/27/2025 is benign, consistent with follicular nodular disease.  3.  On 04/17/2025, patient had low anterior resection.  No intra-abdominal metastasis.  - Pathology reveals 0.5 cm moderately differentiated adenocarcinoma within polypectomy scar.  There is lymphovascular invasion.  3 of 13 mesenteric lymph nodes are positive for malignancy.  pT1 pN1b.  4. PET scan on 05/28/2025 revealed FDG avid low-density lesion in the posterior left submandibular gland measuring up to 1.7 cm. Otherwise normal PET.  5.  Xeloda and oxaliplatin started on 06/03/2025.     Subjective:  Mr. Liz is a 44-year-old gentleman with stage IIIA rectosigmoid adenocarcinoma arising in a polyp.  Patient is status post low anterior resection.  Patient is currently on adjuvant treatment with Xeloda and oxaliplatin which was started on 06/03/2025.    Patient had few side effects with the chemotherapy.  He had fatigue which is improving.  He had cold sensitivity lasting for about 10 days.  That has resolved. He had numbness and tingling in the fingers.  That has resolved.  No neuropathy in the feet.  With oxaliplatin, he had some pain and burning sensation in the right upper extremity.  Now he has a port.    Patient is feeling better now.  Fatigue is much less.  No headache.  No dizziness.  No chest pain.  No shortness of breath.  No nausea or vomiting.  No urinary or bowel complaints.  No bleeding.  No neuropathy now.      Exam:  Patient is alert and oriented x 3.  Not in any distress.  Vitals:  Reviewed.  Rest of system not examined.     Labs: Reviewed.     Assessment:  1.  A 44-year-old gentleman with stage IIIA rectosigmoid adenocarcinoma on adjuvant Xeloda and oxaliplatin.  2.  Low-density lesion in left submandibular gland.  3.  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     Plan:  - Continue chemotherapy.  - Biopsy of left submandibular gland lesion as scheduled.  - See me with next chemotherapy.  - See genetic counselor as scheduled.     Discussion:  1.  Discussed with the patient regarding chemotherapy.  He had few expected side effects of chemotherapy including fatigue, cold sensitivity, neuropathy etc.  Those have all improved.  Explained to the patient that he will again get these side effects with chemotherapy.     2.  Patient will continue on Xeloda and oxaliplatin.  Plan is to give him 4 cycles followed by single agent Xeloda for another 3 months.  At this time, no dose reduction needed.    3.  Patient has lesion in left submandibular gland.  He is scheduled for biopsy.    4.  He and his wife had few questions which were all answered.  He will be seen with next cycle of chemotherapy.      Total visit time of 30 minutes.  Time spent in today's visit, review of chart/investigations today, monitoring for toxicity of high risk drugs and documentation today.       Again, thank you for allowing me to participate in the care of your patient.        Sincerely,        Dudley Galvin MD    Electronically signed

## 2025-06-24 NOTE — PROGRESS NOTES
ONCOLOGY HISTORY: Mr. 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  MMR intact.     1.  Screening colonoscopy on 03/11/2025 revealed polyp in distal transverse colon and rectosigmoid colon.  - Transverse colon polyp is tubular adenoma, no evidence of high-grade dysplasia or malignancy.  - Rectosigmoid polyp revealed moderately differentiated adenocarcinoma, arising in tubular villous adenoma with high-grade dysplasia, invasive into submucosa with positive deep margin.  MMR intact.  2.  CT chest, abdomen and pelvis on 03/20/2025 did not reveal any evidence of metastatic disease.  There is liver segment 7 lesion most likely cavernous hemangioma.  There is enlarged multinodular thyroid nodule.  - CEA on 03/20/2025 is normal at 0.9.  - MRI of the liver in 04/02/25 revealed benign cavernous hemangiomas.  3. Thyroid ultrasound 03/20/2025 revealed thyroid nodules.  - Ultrasound-guided right thyroid FNA on 03/27/2025 is benign, consistent with follicular nodular disease.  3.  On 04/17/2025, patient had low anterior resection.  No intra-abdominal metastasis.  - Pathology reveals 0.5 cm moderately differentiated adenocarcinoma within polypectomy scar.  There is lymphovascular invasion.  3 of 13 mesenteric lymph nodes are positive for malignancy.  pT1 pN1b.  4. PET scan on 05/28/2025 revealed FDG avid low-density lesion in the posterior left submandibular gland measuring up to 1.7 cm. Otherwise normal PET.  5.  Xeloda and oxaliplatin started on 06/03/2025.     Subjective:  Mr. Liz is a 44-year-old gentleman with stage IIIA rectosigmoid adenocarcinoma arising in a polyp.  Patient is status post low anterior resection.  Patient is currently on adjuvant treatment with Xeloda and oxaliplatin which was started on 06/03/2025.    Patient had few side effects with the chemotherapy.  He had fatigue which is improving.  He had cold sensitivity lasting for about 10 days.  That has  resolved. He had numbness and tingling in the fingers.  That has resolved.  No neuropathy in the feet.  With oxaliplatin, he had some pain and burning sensation in the right upper extremity.  Now he has a port.    Patient is feeling better now.  Fatigue is much less.  No headache.  No dizziness.  No chest pain.  No shortness of breath.  No nausea or vomiting.  No urinary or bowel complaints.  No bleeding.  No neuropathy now.      Exam:  Patient is alert and oriented x 3.  Not in any distress.  Vitals: Reviewed.  Rest of system not examined.     Labs: Reviewed.     Assessment:  1.  A 44-year-old gentleman with stage IIIA rectosigmoid adenocarcinoma on adjuvant Xeloda and oxaliplatin.  2.  Low-density lesion in left submandibular gland.  3.  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     Plan:  - Continue chemotherapy.  - Biopsy of left submandibular gland lesion as scheduled.  - See me with next chemotherapy.  - See genetic counselor as scheduled.     Discussion:  1.  Discussed with the patient regarding chemotherapy.  He had few expected side effects of chemotherapy including fatigue, cold sensitivity, neuropathy etc.  Those have all improved.  Explained to the patient that he will again get these side effects with chemotherapy.     2.  Patient will continue on Xeloda and oxaliplatin.  Plan is to give him 4 cycles followed by single agent Xeloda for another 3 months.  At this time, no dose reduction needed.    3.  Patient has lesion in left submandibular gland.  He is scheduled for biopsy.    4.  He and his wife had few questions which were all answered.  He will be seen with next cycle of chemotherapy.      Total visit time of 30 minutes.  Time spent in today's visit, review of chart/investigations today, monitoring for toxicity of high risk drugs and documentation today.

## 2025-06-25 ENCOUNTER — MYC MEDICAL ADVICE (OUTPATIENT)
Dept: ONCOLOGY | Facility: CLINIC | Age: 45
End: 2025-06-25
Payer: COMMERCIAL

## 2025-06-25 DIAGNOSIS — R06.6 HICCUPS: Primary | ICD-10-CM

## 2025-06-25 RX ORDER — BACLOFEN 5 MG/1
5 TABLET ORAL 3 TIMES DAILY PRN
Qty: 30 TABLET | Refills: 1 | Status: SHIPPED | OUTPATIENT
Start: 2025-06-25

## 2025-06-25 NOTE — TELEPHONE ENCOUNTER
Dudley Galivn MD to Me  Eileen Sommers RN  (Selected Message)      6/25/25  4:02 PM  Prescription sent for baclofen to be taken as needed.     Dudley Galvin MD    Pt was notified with recommendations per Dr Galvin.  Patient verbalized understanding and agreement with plan.  Patient was instructed to call the clinic with any questions, concerns, or worsening symptoms.  Jessica Sultana RN on 6/25/2025 at 4:14 PM

## 2025-06-25 NOTE — TELEPHONE ENCOUNTER
Oncology Nurse Triage    Situation:   Eric reporting the following symptoms: hiccups     Background:   Treating Provider:   Dr Galvin     Date of last office visit: 6/24/2025 with Dr Galvin     Recent Treatments:6/24/2025: C2D1 oxaliplatin.     Assessment:     Pt sent the following [place] message:  Eric Fernandez Oncology Rn (supporting Dudley Galvin MD)2 hours ago (12:15 PM)     BP  Hello! One new side effect I've noticed since my infusion yesterday - last night I had some diaphram / intra-rib cage cramps, enough to wake me up. Today I have unstoppable hiccups!  I've read that hiccups can be a side effect of the dethalmexadrone combined with chemotherapy drugs - is there a chance to try a different medication, or since the steroid is only for two days should I just power through and hope it fades by the end of the week?  Thank you!    Writer also talked to pt for further assessment.  States that he had cramping in his diaphragm and rib cage a few times last night.  Happened around 8 pm, midnight, and 3 am. Only lasted for 60-90 seconds and then resolved. Has not happened today.  Pt states that he woke up and had hiccups today. Has been  having hiccups on and off all day long. Was able to get a short break of about 15 minutes earlier today by holding his breath. Also had a short break in hiccups after a nap today, but they always return.  Denies any chest pain, shortness of breath, dizziness, fever/chills, nausea/vomiting, or any other urgent symptoms.   States that he has been very tired today.   Also has noticed some clear nasal drainage today and a slightly hoarse voice, but denies any URI symptoms.      Recommendations:     Will route to Dr Galvin for advice.  Reviewed holding breath, taking sips of water, biting on a lemon, and other possible options for managing hiccups.  Patient verbalized understanding and agreement with plan.  Patient was instructed to call the clinic with any questions, concerns, or  worsening symptoms.  Jessica Sultana RN on 6/25/2025 at 3:02 PM

## 2025-07-03 ENCOUNTER — TELEPHONE (OUTPATIENT)
Dept: ONCOLOGY | Facility: CLINIC | Age: 45
End: 2025-07-03
Payer: COMMERCIAL

## 2025-07-03 NOTE — ORAL ONC MGMT
Oral Chemotherapy Monitoring Program    Subjective/Objective:  Eric Mandel is a 44 year old male contacted by phone for a follow-up visit for oral chemotherapy. Patient reports intermittent waves of nausea and compazine and zofran haven't been helping. Reports feeling nauseous about 3-4 times a day for about 40 minutes to an hour. Reports only throwing up once. Has been alternating prochlorperazine and ondansetron taking about 2-3 times a day. Suggested taking a dose of ondansetron about 30-60 minutes before each Xeloda dose to get ahead of it and continuing to alternate use throughout the day taking each medication every 6-8 hours as instructed. Eric asked about over the counter medications, could consider scopolamine as that can sometimes help with breakthrough N/V. Otherwise, if no improvement may need to consider Olanzapine in the future. Encouraged patient to reach out if new questions or concerns arise.    Assessment/Plan:  Patient is appropriate to continue taking Xeloda as prescribed. We will continue to follow.    Follow-Up:  7/15 labs    Moni Nix, PharmD  Hematology/Oncology Clinical Pharmacist  [facility]  [phone]         5/23/2025    11:00 AM 6/3/2025     9:00 AM 6/10/2025    12:00 PM 6/13/2025     9:00 AM 7/3/2025     2:00 PM   ORAL CHEMOTHERAPY   Assessment Type Initial Work up;New Teach Lab Monitoring;Chart Review Initial Follow up Refill Monthly Follow up   Diagnosis Code Colon Cancer Colon Cancer Colon Cancer Colon Cancer Colon Cancer   Providers Kamar Galvin   Clinic Name/Location [facility]   Drug Name Xeloda (capecitabine)  Xeloda (capecitabine)  Xeloda (capecitabine)  Xeloda (capecitabine) Xeloda (capecitabine)   Dose 2,000 mg  2,000 mg  2,000 mg  2,000 mg 2,000 mg   Current Schedule BID  BID  BID  BID BID   Cycle Details 2 weeks on, 1 week off 2 weeks on, 1 week off 2 weeks on, 1 week off 2 weeks  "on, 1 week off 2 weeks on, 1 week off   Start Date of Last Cycle  6/3/2025 6/3/2025     Planned next cycle start date 6/3/2025  6/24/2025 6/24/2025    Doses missed in last 2 weeks   0     Adherence Assessment   Adherent     Adverse Effects   Fatigue;Nausea     Nausea   Grade 1     Pharmacist Intervention(nausea)   No     Fatigue   Grade 1     Pharmacist Intervention(fatigue)   No     Any new drug interactions?  No No     Is the dose as ordered appropriate for the patient?  Yes Yes         Data saved with a previous flowsheet row definition       Last PHQ-2 Score on record:       11/16/2022     8:25 AM 11/14/2022     3:39 PM   PHQ-2 ( 1999 [employer])   Q1: Little interest or pleasure in doing things 1 1   Q2: Feeling down, depressed or hopeless 1 1   PHQ-2 Score 2 2   Q1: Little interest or pleasure in doing things  Several days   Q2: Feeling down, depressed or hopeless  Several days   PHQ-2 Score  2       Vitals:  BP:   BP Readings from Last 1 Encounters:   06/24/25 109/69     Wt Readings from Last 1 Encounters:   06/24/25 89.7 kg (197 lb 12.8 oz)     Estimated body surface area is 2.18 meters squared as calculated from the following:    Height as of 6/13/25: 1.905 m (6' 3\").    Weight as of 6/24/25: 89.7 kg (197 lb 12.8 oz).    Labs:  _  Result Component Current Result Ref Range   Sodium 140 (6/24/2025) 135 - 145 mmol/L     _  Result Component Current Result Ref Range   Potassium 4.8 (6/24/2025) 3.4 - 5.3 mmol/L     _  Result Component Current Result Ref Range   Calcium 9.8 (6/24/2025) 8.8 - 10.4 mg/dL     No results found for Mag within last 30 days.     No results found for Phos within last 30 days.     _  Result Component Current Result Ref Range   Albumin 4.4 (6/24/2025) 3.5 - 5.2 g/dL     _  Result Component Current Result Ref Range   Urea Nitrogen 14.0 (6/24/2025) 6.0 - 20.0 mg/dL     _  Result Component Current Result Ref Range   Creatinine 1.22 (H) (6/24/2025) 0.67 - 1.17 mg/dL     _  Result Component Current " Result Ref Range   AST 26 (6/24/2025) 0 - 45 U/L     _  Result Component Current Result Ref Range   ALT 45 (6/24/2025) 0 - 70 U/L     _  Result Component Current Result Ref Range   Bilirubin Total 0.6 (6/24/2025) <=1.2 mg/dL     _  Result Component Current Result Ref Range   WBC Count 4.8 (6/24/2025) 4.0 - 11.0 10e3/uL     _  Result Component Current Result Ref Range   Hemoglobin 16.3 (6/24/2025) 13.3 - 17.7 g/dL     _  Result Component Current Result Ref Range   Platelet Count 259 (6/24/2025) 150 - 450 10e3/uL     _  Result Component Current Result Ref Range   Absolute Neutrophils 2.5 (6/24/2025) 1.6 - 8.3 10e3/uL     No results found for ANC within last 30 days.

## 2025-07-09 ENCOUNTER — PATIENT OUTREACH (OUTPATIENT)
Dept: ONCOLOGY | Facility: CLINIC | Age: 45
End: 2025-07-09
Payer: COMMERCIAL

## 2025-07-09 NOTE — PROGRESS NOTES
"Writer spoke with patient he states that this past weekend he was having loose stools and some localized abdominal cramping that is triggered with eating and laying flat he notices as well at times. He has not had a bowel movement since 6am . He is drinking Pedialyte and feeling \"much better\" today. WE discussed BRAT diet for today and keeping up with the fluids and that I would check in with tomorrow morning and determine if labs/IVf's and CARRIE visit is necessary.     Patient verbalized understanding.     Eileen Sommers RN    "

## 2025-07-09 NOTE — PROGRESS NOTES
Writer received a message from patient's wife who is in clinic today for her follow up. She reports that patient is experiencing diarrhea and cramping.      Writer called patient to assess further the symptoms and LVM requesting a return call.     Writer all received a Disability and Leave of Absence Medical Certification that needs to be completed.     Eileen Sommers RN

## 2025-07-11 ENCOUNTER — LAB (OUTPATIENT)
Dept: LAB | Facility: CLINIC | Age: 45
End: 2025-07-11
Payer: COMMERCIAL

## 2025-07-11 ENCOUNTER — HOSPITAL ENCOUNTER (OUTPATIENT)
Facility: CLINIC | Age: 45
Discharge: HOME | End: 2025-07-11
Admitting: INTERNAL MEDICINE
Payer: COMMERCIAL

## 2025-07-11 ENCOUNTER — HOSPITAL ENCOUNTER (OUTPATIENT)
Dept: ULTRASOUND IMAGING | Facility: CLINIC | Age: 45
Discharge: HOME | End: 2025-07-11
Attending: INTERNAL MEDICINE
Payer: COMMERCIAL

## 2025-07-11 DIAGNOSIS — C19 CANCER OF RECTOSIGMOID (COLON) (H): ICD-10-CM

## 2025-07-11 DIAGNOSIS — R22.1 LUMP IN NECK: ICD-10-CM

## 2025-07-11 LAB
ALBUMIN SERPL BCG-MCNC: 3.8 G/DL (ref 3.5–5.2)
ALP SERPL-CCNC: 74 U/L (ref 40–150)
ALT SERPL W P-5'-P-CCNC: 159 U/L (ref 0–70)
ANION GAP SERPL CALCULATED.3IONS-SCNC: 9 MMOL/L (ref 7–15)
AST SERPL W P-5'-P-CCNC: 64 U/L (ref 0–45)
BASOPHILS # BLD AUTO: 0 10E3/UL (ref 0–0.2)
BASOPHILS NFR BLD AUTO: 1 %
BILIRUB SERPL-MCNC: 0.2 MG/DL
BUN SERPL-MCNC: 14.4 MG/DL (ref 6–20)
CALCIUM SERPL-MCNC: 9.3 MG/DL (ref 8.8–10.4)
CHLORIDE SERPL-SCNC: 103 MMOL/L (ref 98–107)
CREAT SERPL-MCNC: 1.16 MG/DL (ref 0.67–1.17)
EGFRCR SERPLBLD CKD-EPI 2021: 79 ML/MIN/1.73M2
EOSINOPHIL # BLD AUTO: 0.4 10E3/UL (ref 0–0.7)
EOSINOPHIL NFR BLD AUTO: 8 %
ERYTHROCYTE [DISTWIDTH] IN BLOOD BY AUTOMATED COUNT: 15.1 % (ref 10–15)
GLUCOSE SERPL-MCNC: 97 MG/DL (ref 70–99)
HCO3 SERPL-SCNC: 29 MMOL/L (ref 22–29)
HCT VFR BLD AUTO: 41 % (ref 40–53)
HGB BLD-MCNC: 14.9 G/DL (ref 13.3–17.7)
IMM GRANULOCYTES # BLD: 0 10E3/UL
IMM GRANULOCYTES NFR BLD: 0 %
LYMPHOCYTES # BLD AUTO: 1.2 10E3/UL (ref 0.8–5.3)
LYMPHOCYTES NFR BLD AUTO: 23 %
MCH RBC QN AUTO: 32.4 PG (ref 26.5–33)
MCHC RBC AUTO-ENTMCNC: 36.3 G/DL (ref 31.5–36.5)
MCV RBC AUTO: 89 FL (ref 78–100)
MONOCYTES # BLD AUTO: 1.1 10E3/UL (ref 0–1.3)
MONOCYTES NFR BLD AUTO: 22 %
NEUTROPHILS # BLD AUTO: 2.4 10E3/UL (ref 1.6–8.3)
NEUTROPHILS NFR BLD AUTO: 46 %
NRBC # BLD AUTO: 0 10E3/UL
NRBC BLD AUTO-RTO: 0 /100
PLATELET # BLD AUTO: 227 10E3/UL (ref 150–450)
POTASSIUM SERPL-SCNC: 3.9 MMOL/L (ref 3.4–5.3)
PROT SERPL-MCNC: 6 G/DL (ref 6.4–8.3)
RBC # BLD AUTO: 4.6 10E6/UL (ref 4.4–5.9)
SODIUM SERPL-SCNC: 141 MMOL/L (ref 135–145)
WBC # BLD AUTO: 5.3 10E3/UL (ref 4–11)

## 2025-07-11 PROCEDURE — 272N000431 US BIOPSY HEAD NECK THORAX SOFT TISSUE

## 2025-07-11 PROCEDURE — 85025 COMPLETE CBC W/AUTO DIFF WBC: CPT

## 2025-07-11 PROCEDURE — 36415 COLL VENOUS BLD VENIPUNCTURE: CPT

## 2025-07-11 PROCEDURE — 96372 THER/PROPH/DIAG INJ SC/IM: CPT | Performed by: STUDENT IN AN ORGANIZED HEALTH CARE EDUCATION/TRAINING PROGRAM

## 2025-07-11 PROCEDURE — 84155 ASSAY OF PROTEIN SERUM: CPT

## 2025-07-11 PROCEDURE — 88173 CYTOPATH EVAL FNA REPORT: CPT | Mod: TC | Performed by: INTERNAL MEDICINE

## 2025-07-11 PROCEDURE — 250N000009 HC RX 250: Performed by: STUDENT IN AN ORGANIZED HEALTH CARE EDUCATION/TRAINING PROGRAM

## 2025-07-11 RX ORDER — LIDOCAINE HYDROCHLORIDE 10 MG/ML
5 INJECTION, SOLUTION EPIDURAL; INFILTRATION; INTRACAUDAL; PERINEURAL ONCE
Status: COMPLETED | OUTPATIENT
Start: 2025-07-11 | End: 2025-07-11

## 2025-07-11 RX ADMIN — LIDOCAINE HYDROCHLORIDE 5 ML: 10 INJECTION, SOLUTION EPIDURAL; INFILTRATION; INTRACAUDAL; PERINEURAL at 10:15

## 2025-07-11 ASSESSMENT — ACTIVITIES OF DAILY LIVING (ADL)
ADLS_ACUITY_SCORE: 59

## 2025-07-15 ENCOUNTER — DOCUMENTATION ONLY (OUTPATIENT)
Dept: PHARMACY | Facility: CLINIC | Age: 45
End: 2025-07-15

## 2025-07-15 ENCOUNTER — INFUSION THERAPY VISIT (OUTPATIENT)
Dept: INFUSION THERAPY | Facility: CLINIC | Age: 45
End: 2025-07-15
Attending: INTERNAL MEDICINE
Payer: COMMERCIAL

## 2025-07-15 VITALS
RESPIRATION RATE: 20 BRPM | TEMPERATURE: 97.8 F | HEART RATE: 75 BPM | BODY MASS INDEX: 24.7 KG/M2 | SYSTOLIC BLOOD PRESSURE: 135 MMHG | DIASTOLIC BLOOD PRESSURE: 83 MMHG | OXYGEN SATURATION: 99 % | WEIGHT: 197.6 LBS

## 2025-07-15 DIAGNOSIS — C19 CANCER OF RECTOSIGMOID (COLON) (H): Primary | ICD-10-CM

## 2025-07-15 LAB
ALBUMIN SERPL BCG-MCNC: 4 G/DL (ref 3.5–5.2)
ALP SERPL-CCNC: 89 U/L (ref 40–150)
ALT SERPL W P-5'-P-CCNC: 97 U/L (ref 0–70)
ANION GAP SERPL CALCULATED.3IONS-SCNC: 10 MMOL/L (ref 7–15)
AST SERPL W P-5'-P-CCNC: ABNORMAL U/L
BASOPHILS # BLD AUTO: 0 10E3/UL (ref 0–0.2)
BASOPHILS NFR BLD AUTO: 1 %
BILIRUB SERPL-MCNC: 0.3 MG/DL
BUN SERPL-MCNC: 18.3 MG/DL (ref 6–20)
CALCIUM SERPL-MCNC: 9.1 MG/DL (ref 8.8–10.4)
CEA SERPL-MCNC: 1.7 NG/ML
CHLORIDE SERPL-SCNC: 104 MMOL/L (ref 98–107)
CREAT SERPL-MCNC: 0.94 MG/DL (ref 0.67–1.17)
EGFRCR SERPLBLD CKD-EPI 2021: >90 ML/MIN/1.73M2
EOSINOPHIL # BLD AUTO: 0.3 10E3/UL (ref 0–0.7)
EOSINOPHIL NFR BLD AUTO: 5 %
ERYTHROCYTE [DISTWIDTH] IN BLOOD BY AUTOMATED COUNT: 16.4 % (ref 10–15)
GLUCOSE SERPL-MCNC: 101 MG/DL (ref 70–99)
HCO3 SERPL-SCNC: 25 MMOL/L (ref 22–29)
HCT VFR BLD AUTO: 38.6 % (ref 40–53)
HGB BLD-MCNC: 14.3 G/DL (ref 13.3–17.7)
IMM GRANULOCYTES # BLD: 0 10E3/UL
IMM GRANULOCYTES NFR BLD: 1 %
LYMPHOCYTES # BLD AUTO: 1.5 10E3/UL (ref 0.8–5.3)
LYMPHOCYTES NFR BLD AUTO: 24 %
MCH RBC QN AUTO: 33.1 PG (ref 26.5–33)
MCHC RBC AUTO-ENTMCNC: 37 G/DL (ref 31.5–36.5)
MCV RBC AUTO: 89 FL (ref 78–100)
MONOCYTES # BLD AUTO: 0.9 10E3/UL (ref 0–1.3)
MONOCYTES NFR BLD AUTO: 14 %
NEUTROPHILS # BLD AUTO: 3.5 10E3/UL (ref 1.6–8.3)
NEUTROPHILS NFR BLD AUTO: 55 %
NRBC # BLD AUTO: 0 10E3/UL
NRBC BLD AUTO-RTO: 0 /100
PLATELET # BLD AUTO: 268 10E3/UL (ref 150–450)
POTASSIUM SERPL-SCNC: 4.5 MMOL/L (ref 3.4–5.3)
PROT SERPL-MCNC: 6.2 G/DL (ref 6.4–8.3)
RBC # BLD AUTO: 4.32 10E6/UL (ref 4.4–5.9)
SODIUM SERPL-SCNC: 139 MMOL/L (ref 135–145)
WBC # BLD AUTO: 6.2 10E3/UL (ref 4–11)

## 2025-07-15 PROCEDURE — 82378 CARCINOEMBRYONIC ANTIGEN: CPT | Performed by: INTERNAL MEDICINE

## 2025-07-15 PROCEDURE — 250N000011 HC RX IP 250 OP 636: Performed by: INTERNAL MEDICINE

## 2025-07-15 PROCEDURE — 96375 TX/PRO/DX INJ NEW DRUG ADDON: CPT

## 2025-07-15 PROCEDURE — 84155 ASSAY OF PROTEIN SERUM: CPT | Performed by: INTERNAL MEDICINE

## 2025-07-15 PROCEDURE — 96367 TX/PROPH/DG ADDL SEQ IV INF: CPT

## 2025-07-15 PROCEDURE — 96415 CHEMO IV INFUSION ADDL HR: CPT

## 2025-07-15 PROCEDURE — 258N000003 HC RX IP 258 OP 636: Performed by: INTERNAL MEDICINE

## 2025-07-15 PROCEDURE — 96413 CHEMO IV INFUSION 1 HR: CPT

## 2025-07-15 PROCEDURE — 85004 AUTOMATED DIFF WBC COUNT: CPT | Performed by: INTERNAL MEDICINE

## 2025-07-15 PROCEDURE — 36591 DRAW BLOOD OFF VENOUS DEVICE: CPT | Performed by: INTERNAL MEDICINE

## 2025-07-15 RX ORDER — HEPARIN SODIUM (PORCINE) LOCK FLUSH IV SOLN 100 UNIT/ML 100 UNIT/ML
5 SOLUTION INTRAVENOUS
Status: DISCONTINUED | OUTPATIENT
Start: 2025-07-15 | End: 2025-07-15 | Stop reason: HOSPADM

## 2025-07-15 RX ORDER — ONDANSETRON 2 MG/ML
8 INJECTION INTRAMUSCULAR; INTRAVENOUS ONCE
Status: COMPLETED | OUTPATIENT
Start: 2025-07-15 | End: 2025-07-15

## 2025-07-15 RX ADMIN — OXALIPLATIN 300 MG: 5 INJECTION, SOLUTION INTRAVENOUS at 11:37

## 2025-07-15 RX ADMIN — DEXTROSE 250 ML: 5 SOLUTION INTRAVENOUS at 11:10

## 2025-07-15 RX ADMIN — ONDANSETRON 8 MG: 2 INJECTION INTRAMUSCULAR; INTRAVENOUS at 11:10

## 2025-07-15 RX ADMIN — FOSAPREPITANT: 150 INJECTION, POWDER, LYOPHILIZED, FOR SOLUTION INTRAVENOUS at 11:13

## 2025-07-15 RX ADMIN — Medication 5 ML: at 13:35

## 2025-07-15 ASSESSMENT — PAIN SCALES - GENERAL: PAINLEVEL_OUTOF10: NO PAIN (0)

## 2025-07-15 NOTE — PROGRESS NOTES
Infusion Nursing Note:  Eric Mandel presents today for Cycle 3 Day 1 Oxaliplatin.    Patient seen by provider today: No   present during visit today: Not Applicable.    Note: Per Zane Martinez, Pharmacist: Ok to proceed with Oxaliplatin today without AST result (specimen had hemolyzed per Truman in lab).       Intravenous Access:  Implanted Port.    Treatment Conditions:  Lab Results   Component Value Date    HGB 14.3 07/15/2025    WBC 6.2 07/15/2025    ANEU 3.5 07/15/2025     07/15/2025        Lab Results   Component Value Date     07/15/2025    POTASSIUM 4.5 07/15/2025    MAG 1.9 04/18/2025    CR 0.94 07/15/2025    FIONA 9.1 07/15/2025    BILITOTAL 0.3 07/15/2025    ALBUMIN 4.0 07/15/2025    ALT 97 (H) 07/15/2025    AST  07/15/2025      Comment:      Unsatisfactory specimen - hemolyzed       Results reviewed, labs MET treatment parameters, ok to proceed with treatment.      Post Infusion Assessment:  Patient tolerated infusion without incident.  Blood return noted pre and post infusion.  Site patent and intact, free from redness, edema or discomfort.  No evidence of extravasations.  Access discontinued per protocol.       Discharge Plan:   Patient declined prescription refills.  Discharge instructions reviewed with: Patient and Family.  Patient and family verbalized understanding of discharge instructions and all questions answered.  AVS to patient via [employer].  Patient will return 8/5/25 for next appointment.   Patient discharged in stable condition accompanied by: self and mother.  Departure Mode: Ambulatory.      Sada Farrell RN

## 2025-07-15 NOTE — PROGRESS NOTES
Oral Chemotherapy Monitoring Program  Lab Follow Up    Reviewed lab results from 7/15/25        5/23/2025    11:00 AM 6/3/2025     9:00 AM 6/10/2025    12:00 PM 6/13/2025     9:00 AM 7/3/2025     2:00 PM 7/15/2025    11:00 AM   ORAL CHEMOTHERAPY   Assessment Type Initial Work up;New Teach Lab Monitoring;Chart Review Initial Follow up Refill Monthly Follow up Lab Monitoring   Diagnosis Code Colon Cancer Colon Cancer Colon Cancer Colon Cancer Colon Cancer Colon Cancer   Providers Kamar Galvin   Clinic Name/Location [facility]   Drug Name Xeloda (capecitabine)  Xeloda (capecitabine)  Xeloda (capecitabine)  Xeloda (capecitabine) Xeloda (capecitabine) Xeloda (capecitabine)   Dose 2,000 mg  2,000 mg  2,000 mg  2,000 mg 2,000 mg 2,000 mg   Current Schedule BID  BID  BID  BID BID BID   Cycle Details 2 weeks on, 1 week off 2 weeks on, 1 week off 2 weeks on, 1 week off 2 weeks on, 1 week off 2 weeks on, 1 week off 2 weeks on, 1 week off   Start Date of Last Cycle  6/3/2025 6/3/2025      Planned next cycle start date 6/3/2025  6/24/2025 6/24/2025     Doses missed in last 2 weeks   0      Adherence Assessment   Adherent      Adverse Effects   Fatigue;Nausea   Increased AST/ALT/T BILI   Nausea   Grade 1      Pharmacist Intervention(nausea)   No      Fatigue   Grade 1      Pharmacist Intervention(fatigue)   No      Increased AST/ALT/T BILI      Grade 1   Pharmacist Intervention(increased ast/alt/t bili)      No   Any new drug interactions?  No No      Is the dose as ordered appropriate for the patient?  Yes Yes          Data saved with a previous flowsheet row definition       Labs:  _  Result Component Current Result Ref Range   Sodium 139 (7/15/2025) 135 - 145 mmol/L     _  Result Component Current Result Ref Range   Potassium 4.5 (7/15/2025) 3.4 - 5.3 mmol/L     _  Result Component Current Result Ref Range   Calcium 9.1 (7/15/2025) 8.8 - 10.4 mg/dL     No  results found for Mag within last 30 days.     No results found for Phos within last 30 days.     _  Result Component Current Result Ref Range   Albumin 4.0 (7/15/2025) 3.5 - 5.2 g/dL     _  Result Component Current Result Ref Range   Urea Nitrogen 18.3 (7/15/2025) 6.0 - 20.0 mg/dL     _  Result Component Current Result Ref Range   Creatinine 0.94 (7/15/2025) 0.67 - 1.17 mg/dL     _  Result Component Current Result Ref Range   AST 64 (H) (7/11/2025) 0 - 45 U/L     _  Result Component Current Result Ref Range   ALT 97 (H) (7/15/2025) 0 - 70 U/L     _  Result Component Current Result Ref Range   Bilirubin Total 0.3 (7/15/2025) <=1.2 mg/dL     _  Result Component Current Result Ref Range   WBC Count 6.2 (7/15/2025) 4.0 - 11.0 10e3/uL     _  Result Component Current Result Ref Range   Hemoglobin 14.3 (7/15/2025) 13.3 - 17.7 g/dL     _  Result Component Current Result Ref Range   Platelet Count 268 (7/15/2025) 150 - 450 10e3/uL     _  Result Component Current Result Ref Range   Absolute Neutrophils 3.5 (7/15/2025) 1.6 - 8.3 10e3/uL     No results found for ANC within last 30 days.        Assessment & Plan:  No concerning abnormalities. Patient has grade 1 increased ALT, but trending down. AST hemolyzed. No dose adjustments needed. OK to continue capecitabine therapy. Will continue to monitor labs with infusion    Follow-Up:  8/4: Labs   8/5: Kamar Farrell  Pharmacy Intern   [facility]   [phone]

## 2025-07-15 NOTE — PROGRESS NOTES
Nursing Note:  Eric Mandel presents today for port labs.    Patient seen by provider today: No   present during visit today: Not Applicable.    Note: N/A.    Intravenous Access:  Labs drawn without difficulty.  Implanted Port.    Discharge Plan:   Patient was sent to [facility] for infusion appointment.    Fidelia Christensen RN

## 2025-07-16 LAB
PATH REPORT.COMMENTS IMP SPEC: ABNORMAL
PATH REPORT.COMMENTS IMP SPEC: ABNORMAL
PATH REPORT.COMMENTS IMP SPEC: YES
PATH REPORT.FINAL DX SPEC: ABNORMAL
PATH REPORT.GROSS SPEC: ABNORMAL
PATH REPORT.MICROSCOPIC SPEC OTHER STN: ABNORMAL

## 2025-07-17 DIAGNOSIS — C08.9 SALIVARY GLAND CANCER (H): Primary | ICD-10-CM

## 2025-07-17 NOTE — PROGRESS NOTES
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    Patient informed. Will schedule him to see ENT.

## 2025-07-28 ENCOUNTER — LAB REQUISITION (OUTPATIENT)
Dept: LAB | Facility: CLINIC | Age: 45
End: 2025-07-28
Payer: COMMERCIAL

## 2025-07-28 LAB
PATH REPORT.COMMENTS IMP SPEC: ABNORMAL
PATH REPORT.COMMENTS IMP SPEC: YES
PATH REPORT.FINAL DX SPEC: ABNORMAL
PATH REPORT.GROSS SPEC: ABNORMAL
PATH REPORT.MICROSCOPIC SPEC OTHER STN: ABNORMAL
PATH REPORT.RELEVANT HX SPEC: ABNORMAL
PATH REPORT.RELEVANT HX SPEC: ABNORMAL
PATH REPORT.SITE OF ORIGIN SPEC: ABNORMAL

## 2025-07-28 PROCEDURE — 88321 CONSLTJ&REPRT SLD PREP ELSWR: CPT | Performed by: PATHOLOGY

## 2025-07-28 RX ORDER — ALBUTEROL SULFATE 0.83 MG/ML
2.5 SOLUTION RESPIRATORY (INHALATION)
OUTPATIENT
Start: 2025-08-05

## 2025-07-28 RX ORDER — HEPARIN SODIUM (PORCINE) LOCK FLUSH IV SOLN 100 UNIT/ML 100 UNIT/ML
5 SOLUTION INTRAVENOUS
OUTPATIENT
Start: 2025-08-05

## 2025-07-28 RX ORDER — HEPARIN SODIUM,PORCINE 10 UNIT/ML
5-20 VIAL (ML) INTRAVENOUS DAILY PRN
OUTPATIENT
Start: 2025-08-05

## 2025-07-28 RX ORDER — DIPHENHYDRAMINE HYDROCHLORIDE 50 MG/ML
50 INJECTION, SOLUTION INTRAMUSCULAR; INTRAVENOUS
Start: 2025-08-05

## 2025-07-28 RX ORDER — EPINEPHRINE 1 MG/ML
0.3 INJECTION, SOLUTION, CONCENTRATE INTRAVENOUS EVERY 5 MIN PRN
OUTPATIENT
Start: 2025-08-05

## 2025-07-28 RX ORDER — ALBUTEROL SULFATE 90 UG/1
1-2 INHALANT RESPIRATORY (INHALATION)
Start: 2025-08-05

## 2025-07-28 RX ORDER — LORAZEPAM 2 MG/ML
0.5 INJECTION INTRAMUSCULAR EVERY 4 HOURS PRN
OUTPATIENT
Start: 2025-08-05

## 2025-07-28 RX ORDER — DIPHENHYDRAMINE HYDROCHLORIDE 50 MG/ML
25 INJECTION, SOLUTION INTRAMUSCULAR; INTRAVENOUS
Start: 2025-08-05

## 2025-07-28 RX ORDER — ONDANSETRON 2 MG/ML
8 INJECTION INTRAMUSCULAR; INTRAVENOUS ONCE
OUTPATIENT
Start: 2025-08-05

## 2025-07-28 RX ORDER — MEPERIDINE HYDROCHLORIDE 25 MG/ML
25 INJECTION INTRAMUSCULAR; INTRAVENOUS; SUBCUTANEOUS
OUTPATIENT
Start: 2025-08-05

## 2025-07-29 DIAGNOSIS — C19 CANCER OF RECTOSIGMOID (COLON) (H): Primary | ICD-10-CM

## 2025-07-29 RX ORDER — CAPECITABINE 500 MG/1
1000 TABLET, FILM COATED ORAL 2 TIMES DAILY
Qty: 112 TABLET | Refills: 0 | Status: SHIPPED | OUTPATIENT
Start: 2025-08-05 | End: 2025-08-19

## 2025-08-04 ENCOUNTER — INFUSION THERAPY VISIT (OUTPATIENT)
Dept: INFUSION THERAPY | Facility: CLINIC | Age: 45
End: 2025-08-04
Attending: INTERNAL MEDICINE
Payer: COMMERCIAL

## 2025-08-04 DIAGNOSIS — C19 CANCER OF RECTOSIGMOID (COLON) (H): Primary | ICD-10-CM

## 2025-08-04 LAB
ALBUMIN SERPL BCG-MCNC: 4.4 G/DL (ref 3.5–5.2)
ALP SERPL-CCNC: 88 U/L (ref 40–150)
ALT SERPL W P-5'-P-CCNC: 151 U/L (ref 0–70)
ANION GAP SERPL CALCULATED.3IONS-SCNC: 12 MMOL/L (ref 7–15)
AST SERPL W P-5'-P-CCNC: ABNORMAL U/L
BASOPHILS # BLD AUTO: 0 10E3/UL (ref 0–0.2)
BASOPHILS NFR BLD AUTO: 0 %
BILIRUB SERPL-MCNC: 0.3 MG/DL
BUN SERPL-MCNC: 18.1 MG/DL (ref 6–20)
CALCIUM SERPL-MCNC: 8.9 MG/DL (ref 8.8–10.4)
CEA SERPL-MCNC: 2.1 NG/ML
CHLORIDE SERPL-SCNC: 105 MMOL/L (ref 98–107)
CREAT SERPL-MCNC: 1.11 MG/DL (ref 0.67–1.17)
EGFRCR SERPLBLD CKD-EPI 2021: 83 ML/MIN/1.73M2
EOSINOPHIL # BLD AUTO: 0.3 10E3/UL (ref 0–0.7)
EOSINOPHIL NFR BLD AUTO: 7 %
ERYTHROCYTE [DISTWIDTH] IN BLOOD BY AUTOMATED COUNT: 18.8 % (ref 10–15)
GLUCOSE SERPL-MCNC: 105 MG/DL (ref 70–99)
HCO3 SERPL-SCNC: 26 MMOL/L (ref 22–29)
HCT VFR BLD AUTO: 38.3 % (ref 40–53)
HGB BLD-MCNC: 13.6 G/DL (ref 13.3–17.7)
IMM GRANULOCYTES # BLD: 0 10E3/UL
IMM GRANULOCYTES NFR BLD: 0 %
LYMPHOCYTES # BLD AUTO: 1.4 10E3/UL (ref 0.8–5.3)
LYMPHOCYTES NFR BLD AUTO: 28 %
MCH RBC QN AUTO: 34.1 PG (ref 26.5–33)
MCHC RBC AUTO-ENTMCNC: 35.5 G/DL (ref 31.5–36.5)
MCV RBC AUTO: 96 FL (ref 78–100)
MONOCYTES # BLD AUTO: 0.9 10E3/UL (ref 0–1.3)
MONOCYTES NFR BLD AUTO: 18 %
NEUTROPHILS # BLD AUTO: 2.3 10E3/UL (ref 1.6–8.3)
NEUTROPHILS NFR BLD AUTO: 47 %
NRBC # BLD AUTO: 0 10E3/UL
NRBC BLD AUTO-RTO: 0 /100
PLATELET # BLD AUTO: 241 10E3/UL (ref 150–450)
POTASSIUM SERPL-SCNC: 3.9 MMOL/L (ref 3.4–5.3)
PROT SERPL-MCNC: 6.4 G/DL (ref 6.4–8.3)
RBC # BLD AUTO: 3.99 10E6/UL (ref 4.4–5.9)
SODIUM SERPL-SCNC: 143 MMOL/L (ref 135–145)
WBC # BLD AUTO: 4.9 10E3/UL (ref 4–11)

## 2025-08-04 PROCEDURE — 36591 DRAW BLOOD OFF VENOUS DEVICE: CPT | Performed by: INTERNAL MEDICINE

## 2025-08-04 PROCEDURE — 85004 AUTOMATED DIFF WBC COUNT: CPT | Performed by: INTERNAL MEDICINE

## 2025-08-04 PROCEDURE — 82378 CARCINOEMBRYONIC ANTIGEN: CPT | Performed by: INTERNAL MEDICINE

## 2025-08-04 PROCEDURE — 250N000011 HC RX IP 250 OP 636: Performed by: INTERNAL MEDICINE

## 2025-08-04 PROCEDURE — 84155 ASSAY OF PROTEIN SERUM: CPT | Performed by: INTERNAL MEDICINE

## 2025-08-04 RX ORDER — HEPARIN SODIUM (PORCINE) LOCK FLUSH IV SOLN 100 UNIT/ML 100 UNIT/ML
5 SOLUTION INTRAVENOUS
OUTPATIENT
Start: 2025-08-04

## 2025-08-04 RX ORDER — HEPARIN SODIUM,PORCINE 10 UNIT/ML
5-20 VIAL (ML) INTRAVENOUS DAILY PRN
OUTPATIENT
Start: 2025-08-04

## 2025-08-04 RX ORDER — HEPARIN SODIUM,PORCINE 10 UNIT/ML
5-20 VIAL (ML) INTRAVENOUS DAILY PRN
Status: CANCELLED | OUTPATIENT
Start: 2025-08-04

## 2025-08-04 RX ORDER — HEPARIN SODIUM (PORCINE) LOCK FLUSH IV SOLN 100 UNIT/ML 100 UNIT/ML
5 SOLUTION INTRAVENOUS
Status: DISCONTINUED | OUTPATIENT
Start: 2025-08-04 | End: 2025-08-04 | Stop reason: HOSPADM

## 2025-08-04 RX ADMIN — Medication 5 ML: at 14:24

## 2025-08-05 ENCOUNTER — INFUSION THERAPY VISIT (OUTPATIENT)
Dept: INFUSION THERAPY | Facility: CLINIC | Age: 45
End: 2025-08-05
Attending: INTERNAL MEDICINE
Payer: COMMERCIAL

## 2025-08-05 ENCOUNTER — ONCOLOGY VISIT (OUTPATIENT)
Dept: ONCOLOGY | Facility: CLINIC | Age: 45
End: 2025-08-05
Attending: INTERNAL MEDICINE
Payer: COMMERCIAL

## 2025-08-05 VITALS
HEART RATE: 87 BPM | OXYGEN SATURATION: 97 % | RESPIRATION RATE: 16 BRPM | WEIGHT: 197.2 LBS | DIASTOLIC BLOOD PRESSURE: 78 MMHG | TEMPERATURE: 97.7 F | BODY MASS INDEX: 24.65 KG/M2 | SYSTOLIC BLOOD PRESSURE: 115 MMHG

## 2025-08-05 DIAGNOSIS — L27.1 HAND FOOT SYNDROME: Primary | ICD-10-CM

## 2025-08-05 DIAGNOSIS — C19 CANCER OF RECTOSIGMOID (COLON) (H): Primary | ICD-10-CM

## 2025-08-05 PROCEDURE — 99215 OFFICE O/P EST HI 40 MIN: CPT | Performed by: INTERNAL MEDICINE

## 2025-08-05 PROCEDURE — 96413 CHEMO IV INFUSION 1 HR: CPT

## 2025-08-05 PROCEDURE — 96367 TX/PROPH/DG ADDL SEQ IV INF: CPT

## 2025-08-05 PROCEDURE — 258N000003 HC RX IP 258 OP 636: Performed by: INTERNAL MEDICINE

## 2025-08-05 PROCEDURE — 250N000011 HC RX IP 250 OP 636: Performed by: INTERNAL MEDICINE

## 2025-08-05 PROCEDURE — G2211 COMPLEX E/M VISIT ADD ON: HCPCS | Performed by: INTERNAL MEDICINE

## 2025-08-05 PROCEDURE — 96415 CHEMO IV INFUSION ADDL HR: CPT

## 2025-08-05 PROCEDURE — 96375 TX/PRO/DX INJ NEW DRUG ADDON: CPT

## 2025-08-05 PROCEDURE — 99214 OFFICE O/P EST MOD 30 MIN: CPT | Mod: 25 | Performed by: INTERNAL MEDICINE

## 2025-08-05 RX ORDER — ONDANSETRON 2 MG/ML
8 INJECTION INTRAMUSCULAR; INTRAVENOUS ONCE
Status: COMPLETED | OUTPATIENT
Start: 2025-08-05 | End: 2025-08-05

## 2025-08-05 RX ORDER — BETAMETHASONE DIPROPIONATE 0.5 MG/G
LOTION TOPICAL 2 TIMES DAILY
Qty: 60 ML | Refills: 0 | Status: SHIPPED | OUTPATIENT
Start: 2025-08-05

## 2025-08-05 RX ORDER — HEPARIN SODIUM (PORCINE) LOCK FLUSH IV SOLN 100 UNIT/ML 100 UNIT/ML
5 SOLUTION INTRAVENOUS
Status: DISCONTINUED | OUTPATIENT
Start: 2025-08-05 | End: 2025-08-05 | Stop reason: HOSPADM

## 2025-08-05 RX ADMIN — DEXTROSE 250 ML: 5 SOLUTION INTRAVENOUS at 09:23

## 2025-08-05 RX ADMIN — FOSAPREPITANT: 150 INJECTION, POWDER, LYOPHILIZED, FOR SOLUTION INTRAVENOUS at 09:27

## 2025-08-05 RX ADMIN — Medication 5 ML: at 11:48

## 2025-08-05 RX ADMIN — ONDANSETRON 8 MG: 2 INJECTION INTRAMUSCULAR; INTRAVENOUS at 09:23

## 2025-08-05 RX ADMIN — OXALIPLATIN 300 MG: 5 INJECTION, SOLUTION INTRAVENOUS at 09:50

## 2025-08-05 ASSESSMENT — PAIN SCALES - GENERAL: PAINLEVEL_OUTOF10: NO PAIN (0)

## 2025-08-19 DIAGNOSIS — C19 CANCER OF RECTOSIGMOID (COLON) (H): Primary | ICD-10-CM

## 2025-08-19 RX ORDER — CAPECITABINE 500 MG/1
TABLET, FILM COATED ORAL
Qty: 84 TABLET | Refills: 0 | Status: SHIPPED | OUTPATIENT
Start: 2025-08-26

## 2025-08-25 ENCOUNTER — PREP FOR PROCEDURE (OUTPATIENT)
Dept: OTOLARYNGOLOGY | Facility: CLINIC | Age: 45
End: 2025-08-25

## 2025-08-25 ENCOUNTER — TELEPHONE (OUTPATIENT)
Dept: OTOLARYNGOLOGY | Facility: CLINIC | Age: 45
End: 2025-08-25

## 2025-08-25 ENCOUNTER — OFFICE VISIT (OUTPATIENT)
Dept: OTOLARYNGOLOGY | Facility: CLINIC | Age: 45
End: 2025-08-25
Payer: COMMERCIAL

## 2025-08-25 VITALS
BODY MASS INDEX: 24.87 KG/M2 | HEART RATE: 78 BPM | DIASTOLIC BLOOD PRESSURE: 89 MMHG | OXYGEN SATURATION: 98 % | SYSTOLIC BLOOD PRESSURE: 138 MMHG | TEMPERATURE: 98.8 F | WEIGHT: 200 LBS | HEIGHT: 75 IN

## 2025-08-25 DIAGNOSIS — C08.9 SALIVARY GLAND CANCER (H): ICD-10-CM

## 2025-08-25 DIAGNOSIS — C08.9 SALIVARY GLAND CANCER (H): Primary | ICD-10-CM

## 2025-08-25 PROCEDURE — 1126F AMNT PAIN NOTED NONE PRSNT: CPT | Performed by: STUDENT IN AN ORGANIZED HEALTH CARE EDUCATION/TRAINING PROGRAM

## 2025-08-25 PROCEDURE — 99204 OFFICE O/P NEW MOD 45 MIN: CPT | Performed by: STUDENT IN AN ORGANIZED HEALTH CARE EDUCATION/TRAINING PROGRAM

## 2025-08-25 PROCEDURE — 3075F SYST BP GE 130 - 139MM HG: CPT | Performed by: STUDENT IN AN ORGANIZED HEALTH CARE EDUCATION/TRAINING PROGRAM

## 2025-08-25 PROCEDURE — 3079F DIAST BP 80-89 MM HG: CPT | Performed by: STUDENT IN AN ORGANIZED HEALTH CARE EDUCATION/TRAINING PROGRAM

## 2025-08-25 ASSESSMENT — PAIN SCALES - GENERAL: PAINLEVEL_OUTOF10: NO PAIN (0)

## 2025-08-27 ENCOUNTER — PATIENT OUTREACH (OUTPATIENT)
Dept: CARE COORDINATION | Facility: CLINIC | Age: 45
End: 2025-08-27
Payer: COMMERCIAL

## 2025-08-28 ENCOUNTER — TELEPHONE (OUTPATIENT)
Dept: OTOLARYNGOLOGY | Facility: CLINIC | Age: 45
End: 2025-08-28
Payer: COMMERCIAL

## (undated) DEVICE — SYR 10ML FINGER CONTROL W/O NDL 309695

## (undated) DEVICE — NDL SCLEROTHERAPY 25GA CARR-LOCK  00711811

## (undated) DEVICE — SU VICRYL 0 TIE 12X18" J906G

## (undated) DEVICE — SUCTION TIP YANKAUER STR K87

## (undated) DEVICE — DAVINCI XI GRASPER FENESTRATED TIP UP 8MM 470347

## (undated) DEVICE — KIT ENDO TURNOVER/PROCEDURE W/CLEAN A SCOPE LINERS 103888

## (undated) DEVICE — BAG DECANTER STERILE WHITE DYNJDEC09

## (undated) DEVICE — SU ETHILON 3-0 PS-1 18" 1663G

## (undated) DEVICE — PACK LAP CHOLE SLC15LCFSD

## (undated) DEVICE — DAVINCI HOT SHEARS TIP COVER  400180

## (undated) DEVICE — ANTIFOG SOLUTION SEE SHARP 150M TROCAR SWABS 30978 (COI)

## (undated) DEVICE — DRAIN JACKSON PRATT CHANNEL 19FR ROUND HUBLESS SIL JP-2230

## (undated) DEVICE — SOL NACL 0.9% INJ 1000ML BAG 2B1324X

## (undated) DEVICE — DRAPE BREAST/CHEST 29420

## (undated) DEVICE — SOL NACL 0.9% IRRIG 1000ML BOTTLE 2F7124

## (undated) DEVICE — DRAIN JACKSON PRATT RESERVOIR 100ML SU130-1305

## (undated) DEVICE — TAPE CLOTH ADHESIVE 3" LATEX FREE

## (undated) DEVICE — DAVINCI XI DRAPE ARM 470015

## (undated) DEVICE — Device

## (undated) DEVICE — TUBING SUCTION MEDI-VAC SOFT 3/16"X20' N520A

## (undated) DEVICE — SUCTION TIP YANKAUER W/O VENT K86

## (undated) DEVICE — DAVINCI XI OBTURATOR BLADELESS 8MM 470359

## (undated) DEVICE — SUCTION MANIFOLD NEPTUNE 2 SYS 4 PORT 0702-020-000

## (undated) DEVICE — SOL WATER IRRIG 1000ML BOTTLE 2F7114

## (undated) DEVICE — DAVINCI XI REDUCER 8-12MM 470381

## (undated) DEVICE — SYR BULB IRRIG DOVER 60 ML LATEX FREE 67000

## (undated) DEVICE — ESU GROUND PAD ADULT W/CORD E7507

## (undated) DEVICE — TUBING CYSTO/BLADDER IRRIG SET 80" 06544-01

## (undated) DEVICE — BLADE KNIFE SURG 10 371110

## (undated) DEVICE — GLOVE BIOGEL PI MICRO INDICATOR UNDERGLOVE SZ 6.5 48965

## (undated) DEVICE — TUBING CONMED AIRSEAL SMOKE EVAC INSUFFLATION ASM-EVAC

## (undated) DEVICE — DRAPE LEGGINGS 8421

## (undated) DEVICE — DAVINCI XI SEAL UNIVERSAL 5-12MM 470500

## (undated) DEVICE — KIT PATIENT POSITIONING PIGAZZI LATEX FREE 40580

## (undated) DEVICE — LUBRICANT INST KIT ENDO-LUBE 220-90

## (undated) DEVICE — SUCTION CANISTER MEDIVAC LINER 3000ML W/LID 65651-530

## (undated) DEVICE — ESU PENCIL W/SMOKE EVAC NEPTUNE STRYKER 0703-046-000

## (undated) DEVICE — SPONGE LAP 18X18" X8435

## (undated) DEVICE — DAVINCI XI DRAPE COLUMN 470341

## (undated) DEVICE — STPL CONTOUR CUT CVD GREEN CS40G

## (undated) DEVICE — SUCTION IRR STRYKERFLOW II W/TIP 250-070-520

## (undated) DEVICE — SURGICEL POWDER ABSORBABLE HEMOSTAT 3GM 3013SP

## (undated) DEVICE — SU VICRYL 2-0 SH 27" J317H

## (undated) DEVICE — GLOVE BIOGEL PI MICRO SZ 6.0 48560

## (undated) DEVICE — SU TIE VICRYL+ 3-0 12X18IN VIO VCP104G

## (undated) DEVICE — DAVINCI XI NDL DRIVER LARGE 470006

## (undated) DEVICE — CLIP HEMOSTASIS ASSURANCE W16 MM BX00711884

## (undated) DEVICE — KIT SIGMOIDOSCOPE ENDOSCOPIC LIGHTED 18FR KI613/10

## (undated) DEVICE — CLEANER INST PRE-KLENZ SOAK SHIELD TUBE 6 ML MEDIUM 2D66J4

## (undated) DEVICE — ESU GROUND PAD UNIVERSAL W/O CORD

## (undated) DEVICE — LINEN TOWEL PACK X5 5464

## (undated) DEVICE — STPL RELOAD ECHELON CONTOUR CVD 40MM GREEN GCR40G

## (undated) DEVICE — JELLY LUBRICATING SURGILUBE 4OZ TUBE

## (undated) DEVICE — STPL CIRCULAR 29MM CVD CDH29P

## (undated) DEVICE — QUICK TRAP

## (undated) DEVICE — NDL INSUFFLATION 13GA 120MM C2201

## (undated) DEVICE — WIPES FOLEY CARE SURESTEP PROVON DFC100

## (undated) DEVICE — ENDO MARKER 5ML SOLUTION FOR GI TRACT BX00711836

## (undated) DEVICE — SUCTION TIP POOLE K770

## (undated) DEVICE — SU MONOCRYL 4-0 PS-2 18" UND Y496G

## (undated) RX ORDER — LABETALOL HYDROCHLORIDE 5 MG/ML
INJECTION, SOLUTION INTRAVENOUS
Status: DISPENSED
Start: 2025-04-17

## (undated) RX ORDER — HYDROMORPHONE HCL IN WATER/PF 6 MG/30 ML
PATIENT CONTROLLED ANALGESIA SYRINGE INTRAVENOUS
Status: DISPENSED
Start: 2025-04-17

## (undated) RX ORDER — BUPIVACAINE HYDROCHLORIDE AND EPINEPHRINE 5; 5 MG/ML; UG/ML
INJECTION, SOLUTION EPIDURAL; INTRACAUDAL; PERINEURAL
Status: DISPENSED
Start: 2025-04-17

## (undated) RX ORDER — FENTANYL CITRATE 50 UG/ML
INJECTION, SOLUTION INTRAMUSCULAR; INTRAVENOUS
Status: DISPENSED
Start: 2025-04-17

## (undated) RX ORDER — FENTANYL CITRATE 0.05 MG/ML
INJECTION, SOLUTION INTRAMUSCULAR; INTRAVENOUS
Status: DISPENSED
Start: 2025-04-17

## (undated) RX ORDER — METRONIDAZOLE 500 MG/100ML
INJECTION, SOLUTION INTRAVENOUS
Status: DISPENSED
Start: 2025-04-17

## (undated) RX ORDER — FENTANYL CITRATE 50 UG/ML
INJECTION, SOLUTION INTRAMUSCULAR; INTRAVENOUS
Status: DISPENSED
Start: 2025-03-11

## (undated) RX ORDER — HYDROMORPHONE HYDROCHLORIDE 1 MG/ML
INJECTION, SOLUTION INTRAMUSCULAR; INTRAVENOUS; SUBCUTANEOUS
Status: DISPENSED
Start: 2025-04-17

## (undated) RX ORDER — HEPARIN SODIUM (PORCINE) LOCK FLUSH IV SOLN 100 UNIT/ML 100 UNIT/ML
SOLUTION INTRAVENOUS
Status: DISPENSED
Start: 2025-06-13

## (undated) RX ORDER — LIDOCAINE HYDROCHLORIDE 10 MG/ML
INJECTION, SOLUTION EPIDURAL; INFILTRATION; INTRACAUDAL; PERINEURAL
Status: DISPENSED
Start: 2025-03-27

## (undated) RX ORDER — INDOCYANINE GREEN AND WATER 25 MG
KIT INJECTION
Status: DISPENSED
Start: 2025-04-17

## (undated) RX ORDER — ONDANSETRON 2 MG/ML
INJECTION INTRAMUSCULAR; INTRAVENOUS
Status: DISPENSED
Start: 2025-04-17

## (undated) RX ORDER — CEFAZOLIN SODIUM 2 G/50ML
SOLUTION INTRAVENOUS
Status: DISPENSED
Start: 2025-06-13

## (undated) RX ORDER — ACETAMINOPHEN 325 MG/1
TABLET ORAL
Status: DISPENSED
Start: 2025-04-17

## (undated) RX ORDER — HEPARIN SODIUM 5000 [USP'U]/.5ML
INJECTION, SOLUTION INTRAVENOUS; SUBCUTANEOUS
Status: DISPENSED
Start: 2025-04-17